# Patient Record
Sex: MALE | Race: WHITE | Employment: OTHER | URBAN - METROPOLITAN AREA
[De-identification: names, ages, dates, MRNs, and addresses within clinical notes are randomized per-mention and may not be internally consistent; named-entity substitution may affect disease eponyms.]

---

## 2018-04-25 ENCOUNTER — HOSPITAL ENCOUNTER (OUTPATIENT)
Age: 68
Discharge: HOME | End: 2018-04-27
Payer: COMMERCIAL

## 2018-04-25 LAB — PROSTATE SPECIFIC ANTIGEN: 9.7 NG/ML (ref 0–4)

## 2018-04-25 PROCEDURE — 84153 ASSAY OF PSA TOTAL: CPT

## 2018-05-14 ENCOUNTER — HOSPITAL ENCOUNTER (OUTPATIENT)
Age: 68
Discharge: HOME | End: 2018-05-16
Payer: COMMERCIAL

## 2018-05-14 LAB — PROSTATE SPECIFIC ANTIGEN: 6.25 NG/ML (ref 0–4)

## 2018-05-14 PROCEDURE — 84153 ASSAY OF PSA TOTAL: CPT

## 2018-08-14 ENCOUNTER — HOSPITAL ENCOUNTER (OUTPATIENT)
Age: 68
Discharge: HOME | End: 2018-08-16
Payer: COMMERCIAL

## 2018-08-14 LAB — PROSTATE SPECIFIC ANTIGEN: 5.5 NG/ML (ref 0–4)

## 2018-08-14 PROCEDURE — 84153 ASSAY OF PSA TOTAL: CPT

## 2018-10-30 ENCOUNTER — HOSPITAL ENCOUNTER (OUTPATIENT)
Dept: MRI IMAGING | Age: 68
Discharge: HOME | End: 2018-11-01
Payer: MEDICARE

## 2018-10-30 DIAGNOSIS — M54.16 LUMBAR RADICULOPATHY: ICD-10-CM

## 2018-10-30 DIAGNOSIS — M54.50 ACUTE MIDLINE LOW BACK PAIN WITHOUT SCIATICA: ICD-10-CM

## 2018-10-30 PROCEDURE — 72148 MRI LUMBAR SPINE W/O DYE: CPT

## 2019-01-30 ENCOUNTER — HOSPITAL ENCOUNTER (OUTPATIENT)
Age: 69
Discharge: HOME | End: 2019-02-01
Payer: MEDICARE

## 2019-01-30 PROCEDURE — 84153 ASSAY OF PSA TOTAL: CPT

## 2019-01-31 LAB — PROSTATE SPECIFIC ANTIGEN: 6.51 NG/ML (ref 0–4)

## 2019-02-08 ENCOUNTER — HOSPITAL ENCOUNTER (OUTPATIENT)
Age: 69
Discharge: HOME | End: 2019-02-10

## 2019-02-08 PROCEDURE — 88305 TISSUE EXAM BY PATHOLOGIST: CPT

## 2020-02-12 ENCOUNTER — HOSPITAL ENCOUNTER (OUTPATIENT)
Age: 70
Discharge: HOME | End: 2020-02-14
Payer: MEDICARE

## 2020-02-12 LAB — PROSTATE SPECIFIC ANTIGEN: 5.02 NG/ML (ref 0–4)

## 2020-02-12 PROCEDURE — 84153 ASSAY OF PSA TOTAL: CPT

## 2020-03-05 ENCOUNTER — OFFICE VISIT (OUTPATIENT)
Dept: CARDIOLOGY CLINIC | Age: 70
End: 2020-03-05
Payer: MEDICARE

## 2020-03-05 VITALS
RESPIRATION RATE: 18 BRPM | WEIGHT: 221.1 LBS | DIASTOLIC BLOOD PRESSURE: 72 MMHG | HEART RATE: 86 BPM | HEIGHT: 73 IN | BODY MASS INDEX: 29.3 KG/M2 | SYSTOLIC BLOOD PRESSURE: 132 MMHG

## 2020-03-05 PROBLEM — I45.10 RBBB: Status: ACTIVE | Noted: 2020-03-05

## 2020-03-05 PROBLEM — I48.19 OTHER PERSISTENT ATRIAL FIBRILLATION (HCC): Status: ACTIVE | Noted: 2020-03-05

## 2020-03-05 PROCEDURE — G8427 DOCREV CUR MEDS BY ELIG CLIN: HCPCS | Performed by: INTERNAL MEDICINE

## 2020-03-05 PROCEDURE — 3017F COLORECTAL CA SCREEN DOC REV: CPT | Performed by: INTERNAL MEDICINE

## 2020-03-05 PROCEDURE — 4040F PNEUMOC VAC/ADMIN/RCVD: CPT | Performed by: INTERNAL MEDICINE

## 2020-03-05 PROCEDURE — G8417 CALC BMI ABV UP PARAM F/U: HCPCS | Performed by: INTERNAL MEDICINE

## 2020-03-05 PROCEDURE — 99205 OFFICE O/P NEW HI 60 MIN: CPT | Performed by: INTERNAL MEDICINE

## 2020-03-05 PROCEDURE — 1123F ACP DISCUSS/DSCN MKR DOCD: CPT | Performed by: INTERNAL MEDICINE

## 2020-03-05 PROCEDURE — 93000 ELECTROCARDIOGRAM COMPLETE: CPT | Performed by: INTERNAL MEDICINE

## 2020-03-05 PROCEDURE — 1036F TOBACCO NON-USER: CPT | Performed by: INTERNAL MEDICINE

## 2020-03-05 PROCEDURE — G8484 FLU IMMUNIZE NO ADMIN: HCPCS | Performed by: INTERNAL MEDICINE

## 2020-03-05 RX ORDER — ASCORBIC ACID 500 MG
500 TABLET ORAL DAILY
COMMUNITY

## 2020-03-05 RX ORDER — MELATONIN 10 MG
10000 TABLET, SUBLINGUAL SUBLINGUAL DAILY
COMMUNITY

## 2020-03-05 RX ORDER — LORATADINE 10 MG/1
10 CAPSULE, LIQUID FILLED ORAL PRN
COMMUNITY

## 2020-03-05 RX ORDER — FLUTICASONE PROPIONATE 50 MCG
1 SPRAY, SUSPENSION (ML) NASAL DAILY
COMMUNITY

## 2020-03-05 RX ORDER — MULTIVITAMIN
TABLET ORAL
COMMUNITY

## 2020-03-05 NOTE — PROGRESS NOTES
pain.   Nose: denies epistaxis or masses   Throat: denies sore throat or trouble swallowing. Neuro: denies numbness, tingling, tremors. Skin: denies rashes or itching. : denies hematuria, dysuria   GI: denies vomiting, diarrhea   Psych: denies mood changed, anxiety, depression. all others negative. Physical Exam   /72   Pulse 86   Resp 18   Ht 6' 1\" (1.854 m)   Wt 221 lb 1.6 oz (100.3 kg)   BMI 29.17 kg/m²   Constitutional: Oriented to person, place, and time. Well-developed and well-nourished. No distress. Head: Normocephalic and atraumatic. Eyes: EOM are normal. Pupils are equal, round, and reactive to light. Neck: Normal range of motion. Neck supple. No hepatojugular reflux and no JVD present. Carotid bruit is not present. No tracheal deviation present. No thyromegaly present. Cardiovascular: Normal rate, irregular rhythm, normal heart sounds and intact distal pulses. Exam reveals no gallop and no friction rub. No murmur heard. Pulmonary/Chest: Effort normal and breath sounds normal. No respiratory distress. No wheezes. No rales. No tenderness. Abdominal: Soft. Bowel sounds are normal. No distension and no mass. No tenderness. No rebound and no guarding. Musculoskeletal: Normal range of motion. No edema and no tenderness. Neurological: Alert and oriented to person, place, and time. Skin: Skin is warm and dry. No rash noted. Not diaphoretic. No erythema. Psychiatric: Normal mood and affect. Behavior is normal.     EKG:  normal sinus rhythm, atrial fibrillation, rate controlled, RBBB, these findings are new since last EKG. ASSESSMENT AND PLAN:  Patient Active Problem List   Diagnosis    Hypertension    Type 2 diabetes mellitus without complication ([facility] [address]    Other persistent atrial fibrillation    RBBB     Newly detected atrial fibrillation, likely paroxysmal.  chads-vasc = 3 which argues for anticoagulation.   Discussed risks and benefits of Eliquis,including severe bleeding  E-prescribed Eliquis 5 mg BID  Will get echo and stress test  Already exercises, should drop 10% weight and investigate sleep apnea possibility thru PCP.      Nikolai Britton M.D  [id number] [facility]

## 2020-04-01 ENCOUNTER — TELEPHONE (OUTPATIENT)
Dept: CARDIOLOGY | Age: 70
End: 2020-04-01

## 2020-04-01 NOTE — TELEPHONE ENCOUNTER
Spoke with patient regarding essential testing per Dr. Loretta Cordova. Advised patient that he will not be having testing done at [place] office, but rather [place] and explained directions. Patient will await phone call to reschedule patient at [facility]. The patient was contacted and understands the reason for the delay in scheduling, and to call 911 for any symptoms.     Electronically signed by Jasmin Mireles on 4/1/2020 at 11:04 AM

## 2020-04-03 ENCOUNTER — TELEPHONE (OUTPATIENT)
Dept: CARDIOLOGY | Age: 70
End: 2020-04-03

## 2020-04-03 ENCOUNTER — TELEPHONE (OUTPATIENT)
Dept: CARDIOLOGY CLINIC | Age: 70
End: 2020-04-03

## 2020-04-03 NOTE — TELEPHONE ENCOUNTER
Left a message on patients home phone, reminder of appointment on Monday 4/6/2020 at 8:00am for a stress test and an echo to follow at 10:00am. Instructions left for patient and if any questions to call office.

## 2020-04-06 ENCOUNTER — APPOINTMENT (OUTPATIENT)
Dept: CARDIOLOGY | Age: 70
End: 2020-04-06
Payer: MEDICARE

## 2020-04-06 ENCOUNTER — HOSPITAL ENCOUNTER (OUTPATIENT)
Dept: CARDIOLOGY | Age: 70
Discharge: HOME | End: 2020-04-06
Payer: MEDICARE

## 2020-04-06 VITALS
WEIGHT: 221 LBS | DIASTOLIC BLOOD PRESSURE: 80 MMHG | HEART RATE: 98 BPM | BODY MASS INDEX: 29.29 KG/M2 | TEMPERATURE: 98.2 F | HEIGHT: 73 IN | SYSTOLIC BLOOD PRESSURE: 136 MMHG

## 2020-04-06 LAB
LV EF: 63 %
LV EF: 65 %
LVEF MODALITY: NORMAL
LVEF MODALITY: NORMAL

## 2020-04-06 PROCEDURE — 93306 TTE W/DOPPLER COMPLETE: CPT | Performed by: PSYCHIATRY & NEUROLOGY

## 2020-04-06 PROCEDURE — 2580000003 HC RX 258: Performed by: INTERNAL MEDICINE

## 2020-04-06 PROCEDURE — 78452 HT MUSCLE IMAGE SPECT MULT: CPT

## 2020-04-06 PROCEDURE — 93306 TTE W/DOPPLER COMPLETE: CPT | Performed by: INTERNAL MEDICINE

## 2020-04-06 PROCEDURE — 3430000000 HC RX DIAGNOSTIC RADIOPHARMACEUTICAL: Performed by: INTERNAL MEDICINE

## 2020-04-06 PROCEDURE — A9502 TC99M TETROFOSMIN: HCPCS | Performed by: INTERNAL MEDICINE

## 2020-04-06 PROCEDURE — 93017 CV STRESS TEST TRACING ONLY: CPT

## 2020-04-06 RX ORDER — SODIUM CHLORIDE 0.9 % (FLUSH) 0.9 %
10 SYRINGE (ML) INJECTION PRN
Status: DISCONTINUED | OUTPATIENT
Start: 2020-04-06 | End: 2020-04-07 | Stop reason: HOSPADM

## 2020-04-06 RX ADMIN — TETROFOSMIN 29 MILLICURIE: 0.23 INJECTION, POWDER, LYOPHILIZED, FOR SOLUTION INTRAVENOUS at 09:19

## 2020-04-06 RX ADMIN — TETROFOSMIN 8.3 MILLICURIE: 0.23 INJECTION, POWDER, LYOPHILIZED, FOR SOLUTION INTRAVENOUS at 08:09

## 2020-04-06 RX ADMIN — Medication 10 ML: at 09:20

## 2020-04-06 RX ADMIN — Medication 10 ML: at 08:09

## 2020-04-06 NOTE — PROCEDURES
of the study was excellent. Left ventricular cavity size was noted to be normal.    Rotational analog analysis demonstrated no patient motion or abnormal extracardiac radioactivity. The gated SPECT stress imaging in the short, vertical long, and horizontal long axis demonstrated normal homogeneous tracer distribution throughout the myocardium. The resting images are normal.     Gated SPECT left ventricular ejection fraction was calculated to be 63%, with normal myocardial thickening and wall motion. Impression:    1. Exercise EKG was negative. 2. The patient experienced no chest pain with exercise. 3. The myocardial perfusion imaging was normal.      4. Overall left ventricular systolic function was normal without regional wall motion abnormalities  5. Exercise capacity was below average. 6. Intermediate risk general exercise nuclear treadmill test due to functional capacity. Thank you for sending your patient to this [employer].      Electronically signed by Tati Peña MD on 4/7/20 at 6:59 AM EDT

## 2020-04-07 ENCOUNTER — TELEPHONE (OUTPATIENT)
Dept: CARDIOLOGY CLINIC | Age: 70
End: 2020-04-07

## 2020-05-14 ENCOUNTER — HOSPITAL ENCOUNTER (OUTPATIENT)
Age: 70
Discharge: HOME | End: 2020-05-16
Payer: MEDICARE

## 2020-05-14 ENCOUNTER — HOSPITAL ENCOUNTER (OUTPATIENT)
Dept: GENERAL RADIOLOGY | Age: 70
Discharge: HOME | End: 2020-05-16
Payer: MEDICARE

## 2020-05-14 PROCEDURE — 73552 X-RAY EXAM OF FEMUR 2/>: CPT

## 2020-05-14 PROCEDURE — 73502 X-RAY EXAM HIP UNI 2-3 VIEWS: CPT

## 2020-05-18 ENCOUNTER — HOSPITAL ENCOUNTER (EMERGENCY)
Age: 70
Discharge: HOME | End: 2020-05-18
Attending: EMERGENCY MEDICINE
Payer: MEDICARE

## 2020-05-18 ENCOUNTER — APPOINTMENT (OUTPATIENT)
Dept: GENERAL RADIOLOGY | Age: 70
End: 2020-05-18
Payer: MEDICARE

## 2020-05-18 VITALS
SYSTOLIC BLOOD PRESSURE: 135 MMHG | TEMPERATURE: 97.2 F | OXYGEN SATURATION: 98 % | DIASTOLIC BLOOD PRESSURE: 85 MMHG | HEART RATE: 74 BPM | RESPIRATION RATE: 17 BRPM

## 2020-05-18 LAB
ALBUMIN SERPL-MCNC: 4.7 G/DL (ref 3.5–5.2)
ALP BLD-CCNC: 83 U/L (ref 40–129)
ALT SERPL-CCNC: 30 U/L (ref 0–40)
ANION GAP SERPL CALCULATED.3IONS-SCNC: 5 MMOL/L (ref 7–16)
AST SERPL-CCNC: 31 U/L (ref 0–39)
BASOPHILS ABSOLUTE: 0.05 E9/L (ref 0–0.2)
BASOPHILS RELATIVE PERCENT: 0.7 % (ref 0–2)
BILIRUB SERPL-MCNC: 1 MG/DL (ref 0–1.2)
BUN BLDV-MCNC: 24 MG/DL (ref 8–23)
CALCIUM SERPL-MCNC: 9.9 MG/DL (ref 8.6–10.2)
CHLORIDE BLD-SCNC: 99 MMOL/L (ref 98–107)
CO2: 28 MMOL/L (ref 22–29)
CREAT SERPL-MCNC: 0.9 MG/DL (ref 0.7–1.2)
EKG ATRIAL RATE: 127 BPM
EKG Q-T INTERVAL: 336 MS
EKG QRS DURATION: 122 MS
EKG QTC CALCULATION (BAZETT): 512 MS
EKG R AXIS: 32 DEGREES
EKG T AXIS: -7 DEGREES
EKG VENTRICULAR RATE: 140 BPM
EOSINOPHILS ABSOLUTE: 0.55 E9/L (ref 0.05–0.5)
EOSINOPHILS RELATIVE PERCENT: 7.7 % (ref 0–6)
GFR AFRICAN AMERICAN: >60
GFR NON-AFRICAN AMERICAN: >60 ML/MIN/1.73
GLUCOSE BLD-MCNC: 135 MG/DL (ref 74–99)
HCT VFR BLD CALC: 47.6 % (ref 37–54)
HEMOGLOBIN: 15.4 G/DL (ref 12.5–16.5)
IMMATURE GRANULOCYTES #: 0.01 E9/L
IMMATURE GRANULOCYTES %: 0.1 % (ref 0–5)
LYMPHOCYTES ABSOLUTE: 1.68 E9/L (ref 1.5–4)
LYMPHOCYTES RELATIVE PERCENT: 23.5 % (ref 20–42)
MAGNESIUM: 2.1 MG/DL (ref 1.6–2.6)
MCH RBC QN AUTO: 29.2 PG (ref 26–35)
MCHC RBC AUTO-ENTMCNC: 32.4 % (ref 32–34.5)
MCV RBC AUTO: 90.2 FL (ref 80–99.9)
MONOCYTES ABSOLUTE: 0.82 E9/L (ref 0.1–0.95)
MONOCYTES RELATIVE PERCENT: 11.5 % (ref 2–12)
NEUTROPHILS ABSOLUTE: 4.05 E9/L (ref 1.8–7.3)
NEUTROPHILS RELATIVE PERCENT: 56.5 % (ref 43–80)
PDW BLD-RTO: 14.1 FL (ref 11.5–15)
PLATELET # BLD: 177 E9/L (ref 130–450)
PMV BLD AUTO: 11.6 FL (ref 7–12)
POTASSIUM SERPL-SCNC: 5.1 MMOL/L (ref 3.5–5)
PRO-BNP: 820 PG/ML (ref 0–125)
RBC # BLD: 5.28 E12/L (ref 3.8–5.8)
SODIUM BLD-SCNC: 132 MMOL/L (ref 132–146)
TOTAL PROTEIN: 8 G/DL (ref 6.4–8.3)
TROPONIN: <0.01 NG/ML (ref 0–0.03)
TSH SERPL DL<=0.05 MIU/L-ACNC: 0.87 UIU/ML (ref 0.27–4.2)
WBC # BLD: 7.2 E9/L (ref 4.5–11.5)

## 2020-05-18 PROCEDURE — 71045 X-RAY EXAM CHEST 1 VIEW: CPT

## 2020-05-18 PROCEDURE — 80053 COMPREHEN METABOLIC PANEL: CPT

## 2020-05-18 PROCEDURE — 93010 ELECTROCARDIOGRAM REPORT: CPT | Performed by: INTERNAL MEDICINE

## 2020-05-18 PROCEDURE — 94760 N-INVAS EAR/PLS OXIMETRY 1: CPT

## 2020-05-18 PROCEDURE — 84484 ASSAY OF TROPONIN QUANT: CPT

## 2020-05-18 PROCEDURE — 85025 COMPLETE CBC W/AUTO DIFF WBC: CPT

## 2020-05-18 PROCEDURE — 2580000003 HC RX 258: Performed by: EMERGENCY MEDICINE

## 2020-05-18 PROCEDURE — 83880 ASSAY OF NATRIURETIC PEPTIDE: CPT

## 2020-05-18 PROCEDURE — 2500000003 HC RX 250 WO HCPCS: Performed by: EMERGENCY MEDICINE

## 2020-05-18 PROCEDURE — 96365 THER/PROPH/DIAG IV INF INIT: CPT

## 2020-05-18 PROCEDURE — 83735 ASSAY OF MAGNESIUM: CPT

## 2020-05-18 PROCEDURE — 6370000000 HC RX 637 (ALT 250 FOR IP): Performed by: RADIOLOGY

## 2020-05-18 PROCEDURE — 93005 ELECTROCARDIOGRAM TRACING: CPT | Performed by: EMERGENCY MEDICINE

## 2020-05-18 PROCEDURE — 84443 ASSAY THYROID STIM HORMONE: CPT

## 2020-05-18 PROCEDURE — 99285 EMERGENCY DEPT VISIT HI MDM: CPT

## 2020-05-18 RX ORDER — METOPROLOL SUCCINATE 25 MG/1
50 TABLET, EXTENDED RELEASE ORAL ONCE
Status: COMPLETED | OUTPATIENT
Start: 2020-05-18 | End: 2020-05-18

## 2020-05-18 RX ADMIN — DILTIAZEM HYDROCHLORIDE 25 MG: 5 INJECTION INTRAVENOUS at 15:17

## 2020-05-18 RX ADMIN — APIXABAN 5 MG: 5 TABLET, FILM COATED ORAL at 17:15

## 2020-05-18 RX ADMIN — METOPROLOL SUCCINATE 50 MG: 25 TABLET, EXTENDED RELEASE ORAL at 17:15

## 2020-05-18 ASSESSMENT — ENCOUNTER SYMPTOMS
DIARRHEA: 0
VOMITING: 0
ABDOMINAL PAIN: 0
SHORTNESS OF BREATH: 1
CHEST TIGHTNESS: 0
COUGH: 0
NAUSEA: 0

## 2020-05-18 NOTE — ED PROVIDER NOTES
Rate 127 BPM    QRS Duration 122 ms    Q-T Interval 336 ms    QTc Calculation (Bazett) 512 ms    R Axis 32 degrees    T Axis -7 degrees   EKG 12 Lead   Result Value Ref Range    Ventricular Rate 75 BPM    Atrial Rate 300 BPM    QRS Duration 140 ms    Q-T Interval 406 ms    QTc Calculation (Bazett) 453 ms    R Axis 8 degrees    T Axis -15 degrees       Radiology:  XR CHEST PORTABLE   Final Result   Findings compatible with atherosclerotic disease    No acute infiltrate                         ------------------------- NURSING NOTES AND VITALS REVIEWED ---------------------------  Date / Time Roomed:  5/18/2020  2:49 PM  ED Bed Assignment:  17B/17B-17    The nursing notes within the ED encounter and vital signs as below have been reviewed. /85   Pulse 74   Temp 97.2 °F (36.2 °C) (Temporal)   Resp 17   SpO2 98%   Oxygen Saturation Interpretation: Normal      ------------------------------------------ PROGRESS NOTES ------------------------------------------  5:09 PM EDT  I have spoken with the patient and discussed todays results, in addition to providing specific details for the plan of care and counseling regarding the diagnosis and prognosis. Their questions are answered at this time and they are agreeable with the plan. I discussed at length with them reasons for immediate return here for re evaluation. They will followup with Dr. Cortez Lopez tomorrow to discuss his ED visit and form a plan.       --------------------------------- ADDITIONAL PROVIDER NOTES ---------------------------------  At this time the patient is without objective evidence of an acute process requiring hospitalization or inpatient management. They have remained hemodynamically stable throughout their entire ED visit and are stable for discharge with outpatient follow-up. The plan has been discussed in detail and they are aware of the specific conditions for emergent return, as well as the importance of follow-up.       Discharge

## 2020-05-19 ENCOUNTER — OFFICE VISIT (OUTPATIENT)
Dept: CARDIOLOGY CLINIC | Age: 70
End: 2020-05-19
Payer: MEDICARE

## 2020-05-19 ENCOUNTER — HOSPITAL ENCOUNTER (OUTPATIENT)
Age: 70
Setting detail: OBSERVATION
Discharge: HOME | End: 2020-05-20
Attending: EMERGENCY MEDICINE | Admitting: INTERNAL MEDICINE
Payer: MEDICARE

## 2020-05-19 ENCOUNTER — APPOINTMENT (OUTPATIENT)
Dept: GENERAL RADIOLOGY | Age: 70
End: 2020-05-19
Payer: MEDICARE

## 2020-05-19 VITALS
HEART RATE: 151 BPM | SYSTOLIC BLOOD PRESSURE: 110 MMHG | RESPIRATION RATE: 16 BRPM | HEIGHT: 73 IN | BODY MASS INDEX: 28.49 KG/M2 | WEIGHT: 215 LBS | DIASTOLIC BLOOD PRESSURE: 76 MMHG

## 2020-05-19 PROBLEM — I48.91 ATRIAL FIBRILLATION WITH RVR (HCC): Status: ACTIVE | Noted: 2020-05-19

## 2020-05-19 PROBLEM — Z92.89 HISTORY OF ECHOCARDIOGRAM: Status: ACTIVE | Noted: 2020-05-19

## 2020-05-19 PROBLEM — Z92.89 HISTORY OF NUCLEAR STRESS TEST: Status: ACTIVE | Noted: 2020-05-19

## 2020-05-19 LAB
ANION GAP SERPL CALCULATED.3IONS-SCNC: 10 MMOL/L (ref 7–16)
BASOPHILS ABSOLUTE: 0.04 E9/L (ref 0–0.2)
BASOPHILS RELATIVE PERCENT: 0.6 % (ref 0–2)
BUN BLDV-MCNC: 22 MG/DL (ref 8–23)
CALCIUM SERPL-MCNC: 9.1 MG/DL (ref 8.6–10.2)
CHLORIDE BLD-SCNC: 107 MMOL/L (ref 98–107)
CO2: 23 MMOL/L (ref 22–29)
CREAT SERPL-MCNC: 0.7 MG/DL (ref 0.7–1.2)
EKG ATRIAL RATE: 138 BPM
EKG ATRIAL RATE: 300 BPM
EKG Q-T INTERVAL: 332 MS
EKG Q-T INTERVAL: 406 MS
EKG QRS DURATION: 122 MS
EKG QRS DURATION: 140 MS
EKG QTC CALCULATION (BAZETT): 453 MS
EKG QTC CALCULATION (BAZETT): 519 MS
EKG R AXIS: 8 DEGREES
EKG T AXIS: -15 DEGREES
EKG T AXIS: -6 DEGREES
EKG VENTRICULAR RATE: 147 BPM
EKG VENTRICULAR RATE: 75 BPM
EOSINOPHILS ABSOLUTE: 0.33 E9/L (ref 0.05–0.5)
EOSINOPHILS RELATIVE PERCENT: 5.3 % (ref 0–6)
GFR AFRICAN AMERICAN: >60
GFR NON-AFRICAN AMERICAN: >60 ML/MIN/1.73
GLUCOSE BLD-MCNC: 145 MG/DL (ref 74–99)
HCT VFR BLD CALC: 47 % (ref 37–54)
HEMOGLOBIN: 15.2 G/DL (ref 12.5–16.5)
IMMATURE GRANULOCYTES #: 0.03 E9/L
IMMATURE GRANULOCYTES %: 0.5 % (ref 0–5)
INR BLD: 1.4
LYMPHOCYTES ABSOLUTE: 1.2 E9/L (ref 1.5–4)
LYMPHOCYTES RELATIVE PERCENT: 19.1 % (ref 20–42)
MCH RBC QN AUTO: 29.4 PG (ref 26–35)
MCHC RBC AUTO-ENTMCNC: 32.3 % (ref 32–34.5)
MCV RBC AUTO: 90.9 FL (ref 80–99.9)
METER GLUCOSE: 103 MG/DL (ref 74–99)
MONOCYTES ABSOLUTE: 0.72 E9/L (ref 0.1–0.95)
MONOCYTES RELATIVE PERCENT: 11.5 % (ref 2–12)
NEUTROPHILS ABSOLUTE: 3.95 E9/L (ref 1.8–7.3)
NEUTROPHILS RELATIVE PERCENT: 63 % (ref 43–80)
PDW BLD-RTO: 14.2 FL (ref 11.5–15)
PLATELET # BLD: 178 E9/L (ref 130–450)
PMV BLD AUTO: 12 FL (ref 7–12)
POTASSIUM REFLEX MAGNESIUM: 4.5 MMOL/L (ref 3.5–5)
PRO-BNP: 549 PG/ML (ref 0–125)
PRO-BNP: 583 PG/ML (ref 0–125)
PROTHROMBIN TIME: 15.9 SEC (ref 9.3–12.4)
RBC # BLD: 5.17 E12/L (ref 3.8–5.8)
REASON FOR REJECTION: NORMAL
REJECTED TEST: NORMAL
SARS-COV-2, NAAT: NOT DETECTED
SODIUM BLD-SCNC: 140 MMOL/L (ref 132–146)
TROPONIN: <0.01 NG/ML (ref 0–0.03)
TROPONIN: <0.01 NG/ML (ref 0–0.03)
WBC # BLD: 6.3 E9/L (ref 4.5–11.5)

## 2020-05-19 PROCEDURE — 4040F PNEUMOC VAC/ADMIN/RCVD: CPT | Performed by: INTERNAL MEDICINE

## 2020-05-19 PROCEDURE — 99285 EMERGENCY DEPT VISIT HI MDM: CPT

## 2020-05-19 PROCEDURE — G8417 CALC BMI ABV UP PARAM F/U: HCPCS | Performed by: INTERNAL MEDICINE

## 2020-05-19 PROCEDURE — 96374 THER/PROPH/DIAG INJ IV PUSH: CPT

## 2020-05-19 PROCEDURE — 1036F TOBACCO NON-USER: CPT | Performed by: INTERNAL MEDICINE

## 2020-05-19 PROCEDURE — 93000 ELECTROCARDIOGRAM COMPLETE: CPT | Performed by: INTERNAL MEDICINE

## 2020-05-19 PROCEDURE — 82962 GLUCOSE BLOOD TEST: CPT

## 2020-05-19 PROCEDURE — 36415 COLL VENOUS BLD VENIPUNCTURE: CPT

## 2020-05-19 PROCEDURE — 2500000003 HC RX 250 WO HCPCS: Performed by: EMERGENCY MEDICINE

## 2020-05-19 PROCEDURE — 93010 ELECTROCARDIOGRAM REPORT: CPT | Performed by: INTERNAL MEDICINE

## 2020-05-19 PROCEDURE — U0002 COVID-19 LAB TEST NON-CDC: HCPCS

## 2020-05-19 PROCEDURE — G8427 DOCREV CUR MEDS BY ELIG CLIN: HCPCS | Performed by: INTERNAL MEDICINE

## 2020-05-19 PROCEDURE — 2580000003 HC RX 258: Performed by: PHYSICIAN ASSISTANT

## 2020-05-19 PROCEDURE — G0378 HOSPITAL OBSERVATION PER HR: HCPCS

## 2020-05-19 PROCEDURE — 2140000000 HC CCU INTERMEDIATE R&B

## 2020-05-19 PROCEDURE — 80048 BASIC METABOLIC PNL TOTAL CA: CPT

## 2020-05-19 PROCEDURE — 71045 X-RAY EXAM CHEST 1 VIEW: CPT

## 2020-05-19 PROCEDURE — 85610 PROTHROMBIN TIME: CPT

## 2020-05-19 PROCEDURE — 1123F ACP DISCUSS/DSCN MKR DOCD: CPT | Performed by: INTERNAL MEDICINE

## 2020-05-19 PROCEDURE — 85025 COMPLETE CBC W/AUTO DIFF WBC: CPT

## 2020-05-19 PROCEDURE — 3017F COLORECTAL CA SCREEN DOC REV: CPT | Performed by: INTERNAL MEDICINE

## 2020-05-19 PROCEDURE — 83880 ASSAY OF NATRIURETIC PEPTIDE: CPT

## 2020-05-19 PROCEDURE — 2580000003 HC RX 258: Performed by: EMERGENCY MEDICINE

## 2020-05-19 PROCEDURE — 94761 N-INVAS EAR/PLS OXIMETRY MLT: CPT

## 2020-05-19 PROCEDURE — 93005 ELECTROCARDIOGRAM TRACING: CPT | Performed by: EMERGENCY MEDICINE

## 2020-05-19 PROCEDURE — 99999 PR OFFICE/OUTPT VISIT,PROCEDURE ONLY: CPT | Performed by: INTERNAL MEDICINE

## 2020-05-19 PROCEDURE — 84484 ASSAY OF TROPONIN QUANT: CPT

## 2020-05-19 RX ORDER — ONDANSETRON 2 MG/ML
4 INJECTION INTRAMUSCULAR; INTRAVENOUS EVERY 6 HOURS PRN
Status: DISCONTINUED | OUTPATIENT
Start: 2020-05-19 | End: 2020-05-20 | Stop reason: HOSPADM

## 2020-05-19 RX ORDER — 0.9 % SODIUM CHLORIDE 0.9 %
1000 INTRAVENOUS SOLUTION INTRAVENOUS ONCE
Status: COMPLETED | OUTPATIENT
Start: 2020-05-19 | End: 2020-05-19

## 2020-05-19 RX ORDER — PROMETHAZINE HYDROCHLORIDE 25 MG/1
12.5 TABLET ORAL EVERY 6 HOURS PRN
Status: DISCONTINUED | OUTPATIENT
Start: 2020-05-19 | End: 2020-05-20 | Stop reason: HOSPADM

## 2020-05-19 RX ORDER — DEXTROSE MONOHYDRATE 25 G/50ML
12.5 INJECTION, SOLUTION INTRAVENOUS PRN
Status: DISCONTINUED | OUTPATIENT
Start: 2020-05-19 | End: 2020-05-20 | Stop reason: HOSPADM

## 2020-05-19 RX ORDER — POLYETHYLENE GLYCOL 3350 17 G/17G
17 POWDER, FOR SOLUTION ORAL DAILY PRN
Status: DISCONTINUED | OUTPATIENT
Start: 2020-05-19 | End: 2020-05-20 | Stop reason: HOSPADM

## 2020-05-19 RX ORDER — DEXTROSE MONOHYDRATE 50 MG/ML
100 INJECTION, SOLUTION INTRAVENOUS PRN
Status: DISCONTINUED | OUTPATIENT
Start: 2020-05-19 | End: 2020-05-20 | Stop reason: HOSPADM

## 2020-05-19 RX ORDER — METOPROLOL SUCCINATE 50 MG/1
50 TABLET, EXTENDED RELEASE ORAL DAILY
Status: DISCONTINUED | OUTPATIENT
Start: 2020-05-19 | End: 2020-05-20 | Stop reason: HOSPADM

## 2020-05-19 RX ORDER — NICOTINE POLACRILEX 4 MG
15 LOZENGE BUCCAL PRN
Status: DISCONTINUED | OUTPATIENT
Start: 2020-05-19 | End: 2020-05-20 | Stop reason: HOSPADM

## 2020-05-19 RX ORDER — ACETAMINOPHEN 325 MG/1
325 TABLET ORAL EVERY 6 HOURS PRN
Status: DISCONTINUED | OUTPATIENT
Start: 2020-05-19 | End: 2020-05-20 | Stop reason: HOSPADM

## 2020-05-19 RX ORDER — AMLODIPINE BESYLATE 10 MG/1
10 TABLET ORAL DAILY
Status: DISCONTINUED | OUTPATIENT
Start: 2020-05-19 | End: 2020-05-20 | Stop reason: HOSPADM

## 2020-05-19 RX ORDER — TRAMADOL HYDROCHLORIDE 50 MG/1
50 TABLET ORAL EVERY 6 HOURS PRN
Status: DISCONTINUED | OUTPATIENT
Start: 2020-05-19 | End: 2020-05-20 | Stop reason: HOSPADM

## 2020-05-19 RX ORDER — NITROGLYCERIN 0.4 MG/1
0.4 TABLET SUBLINGUAL EVERY 5 MIN PRN
Status: DISCONTINUED | OUTPATIENT
Start: 2020-05-19 | End: 2020-05-20 | Stop reason: HOSPADM

## 2020-05-19 RX ORDER — ACETAMINOPHEN 325 MG/1
650 TABLET ORAL EVERY 6 HOURS PRN
Status: DISCONTINUED | OUTPATIENT
Start: 2020-05-19 | End: 2020-05-20 | Stop reason: HOSPADM

## 2020-05-19 RX ORDER — ACETAMINOPHEN 650 MG/1
650 SUPPOSITORY RECTAL EVERY 6 HOURS PRN
Status: DISCONTINUED | OUTPATIENT
Start: 2020-05-19 | End: 2020-05-20 | Stop reason: HOSPADM

## 2020-05-19 RX ORDER — POTASSIUM CHLORIDE 7.45 MG/ML
10 INJECTION INTRAVENOUS PRN
Status: DISCONTINUED | OUTPATIENT
Start: 2020-05-19 | End: 2020-05-20 | Stop reason: HOSPADM

## 2020-05-19 RX ORDER — POTASSIUM CHLORIDE 20 MEQ/1
40 TABLET, EXTENDED RELEASE ORAL PRN
Status: DISCONTINUED | OUTPATIENT
Start: 2020-05-19 | End: 2020-05-20 | Stop reason: HOSPADM

## 2020-05-19 RX ORDER — SODIUM CHLORIDE 0.9 % (FLUSH) 0.9 %
10 SYRINGE (ML) INJECTION EVERY 12 HOURS SCHEDULED
Status: DISCONTINUED | OUTPATIENT
Start: 2020-05-19 | End: 2020-05-20 | Stop reason: HOSPADM

## 2020-05-19 RX ORDER — SODIUM CHLORIDE 0.9 % (FLUSH) 0.9 %
10 SYRINGE (ML) INJECTION PRN
Status: DISCONTINUED | OUTPATIENT
Start: 2020-05-19 | End: 2020-05-20 | Stop reason: HOSPADM

## 2020-05-19 RX ADMIN — SODIUM CHLORIDE, PRESERVATIVE FREE 10 ML: 5 INJECTION INTRAVENOUS at 21:00

## 2020-05-19 RX ADMIN — SODIUM CHLORIDE 1000 ML: 9 INJECTION, SOLUTION INTRAVENOUS at 15:05

## 2020-05-19 RX ADMIN — DILTIAZEM HYDROCHLORIDE 25 MG: 5 INJECTION INTRAVENOUS at 15:06

## 2020-05-19 ASSESSMENT — PAIN SCALES - GENERAL
PAINLEVEL_OUTOF10: 0

## 2020-05-19 NOTE — ED PROVIDER NOTES
same.  He had intention to have ISSAC and cardioversion with cardiology however when his symptoms worsened and his heart rate increased he was directed to ED. He arrives normotensive and otherwise well-appearing, minimal distress. No increased work of breathing or hypoxia. EKG shows no signs of ischemia or injury, unchanged from previous. Patient was given bolus of Cardizem. There was successful rate control, repeat EKG shows an atrial flutter with no acute ST changes. Patient is still feeling somewhat fatigued. As patient has been having paroxysms of RVR despite appropriate medication, he will be admitted for management. He rested comfortably during his ED course. Metabolic panel is within acceptable limits, no leukocytosis or anemia. Troponin is negative, slight elevation in BNP but chest x-ray is unremarkable. Re-Evaluations: This patient's ED course included: a personal history and physicial examination, re-evaluation prior to disposition, IV medications, cardiac monitoring and continuous pulse oximetry    This patient has remained hemodynamically stable and improved during their ED course. Counseling: The emergency provider has spoken with the patient and discussed todays results, in addition to providing specific details for the plan of care and counseling regarding the diagnosis and prognosis. Questions are answered at this time and they are agreeable with the plan.       --------------------------------- IMPRESSION AND DISPOSITION ---------------------------------    IMPRESSION  1. Atrial fibrillation with RVR (HCC)        DISPOSITION  Disposition: Admit to telemetry  Patient condition is stable        NOTE: This report was transcribed using voice recognition software.  Every effort was made to ensure accuracy; however, inadvertent computerized transcription errors may be present        Diego Harrison DO  05/19/20 7919

## 2020-05-19 NOTE — PROGRESS NOTES
Chief Complaint   Patient presents with    Atrial Flutter    Hypertension       Patient Active Problem List    Diagnosis Date Noted    History of nuclear stress test 05/19/2020     Overview Note:     A. Normal exercise nuclear 2020        History of echocardiogram 05/19/2020     Overview Note:     A. Normal echo 2020      Hyperlipidemia     DALTON (obstructive sleep apnea)     Other persistent atrial fibrillation 03/05/2020     Overview Note:     A.  CHADS-VASC = 3      RBBB 03/05/2020    Hypertension 09/13/2015    Type 2 diabetes mellitus without complication (HCC) 79/47/1939       Current Outpatient Medications   Medication Sig Dispense Refill    apixaban (ELIQUIS) 5 MG TABS tablet Take 5 mg by mouth 2 times daily      loratadine (CLARITIN) 10 MG capsule Take 10 mg by mouth as needed       fluticasone (FLONASE) 50 MCG/ACT nasal spray 1 spray by Each Nostril route daily      Multiple Vitamin (MULTI VITAMIN MENS) TABS Take by mouth      Potassium 99 MG TABS Take 99 mg by mouth daily      Cholecalciferol (VITAMIN D3) 250 MCG (37839 UT) TABS Take 10,000 Units by mouth daily      vitamin C (ASCORBIC ACID) 500 MG tablet Take 500 mg by mouth daily      amLODIPine (NORVASC) 10 MG tablet Take 10 mg by mouth daily       metFORMIN ER (GLUCOPHAGE-XR) 500 MG XR tablet Take 500 mg by mouth daily (with breakfast)       metoprolol (TOPROL-XL) 50 MG XL tablet Take 50 mg by mouth daily       Aspirin-Acetaminophen-Caffeine (EXCEDRIN PO) Take by mouth as needed       traMADol-acetaminophen (ULTRACET) 37.5-325 MG per tablet Take 1 tablet by mouth every 6 hours as needed for Pain       Current Facility-Administered Medications   Medication Dose Route Frequency Provider Last Rate Last Dose    perflutren lipid microspheres (DEFINITY) injection 1.65 mg  1.5 mL Intravenous ONCE PRN Molina Garibay MD            Allergies   Allergen Reactions    Pcn [Penicillins] Hives       Vitals:    05/19/20 1114   BP: 110/76   Pulse: 151   Resp: 16   Weight: 215 lb (97.5 kg)   Height: 6' 1\" (1.854 m)       Social History     Socioeconomic History    Marital status:      Spouse name: Not on file    Number of children: Not on file    Years of education: Not on file    Highest education level: Not on file   Occupational History    Not on file   Social Needs    Financial resource strain: Not on file    Food insecurity     Worry: Not on file     Inability: Not on file    Transportation needs     Medical: Not on file     Non-medical: Not on file   Tobacco Use    Smoking status: Never Smoker    Smokeless tobacco: Never Used   Substance and Sexual Activity    Alcohol use: No    Drug use: No    Sexual activity: Not on file   Lifestyle    Physical activity     Days per week: Not on file     Minutes per session: Not on file    Stress: Not on file   Relationships    Social connections     Talks on phone: Not on file     Gets together: Not on file     Attends Cheondo[employer] service: Not on file     Active member of club or organization: Not on file     Attends meetings of clubs or organizations: Not on file     Relationship status: Not on file    Intimate partner violence     Fear of current or ex partner: Not on file     Emotionally abused: Not on file     Physically abused: Not on file     Forced sexual activity: Not on file   Other Topics Concern    Not on file   Social History Narrative    Not on file       Family History   Problem Relation Age of Onset    Diabetes Mother     Heart Attack Father 48        MI           SUBJECTIVE: Logan Clark presents to the office today for urgent folllowup from ED for atrial flutter with RVR.    he has been compliant with BB but stopped his eliquis for a few days after he injured his Hammy and developed extensive ecchymosis and pain  He complains of fatigue and denies   dyspnea, exertional chest pressure/discomfort, lower extremity edema, near-syncope, orthopnea,  paroxysmal nocturnal dyspnea, syncope and tachypnea. Review of Systems:   Heart: as above   Lungs: as above   Eyes: denies changes in vision or discharge. Ears: denies changes in hearing or pain. Nose: denies epistaxis or masses   Throat: denies sore throat or trouble swallowing. Neuro: denies numbness, tingling, tremors. Skin: denies rashes or itching. : denies hematuria, dysuria   GI: denies vomiting, diarrhea   Psych: denies mood changed, anxiety, depression. all others negative. Physical Exam   /76   Pulse 151   Resp 16   Ht 6' 1\" (1.854 m)   Wt 215 lb (97.5 kg)   BMI 28.37 kg/m²   Constitutional: Oriented to person, place, and time. Well-developed and well-nourished. No distress. Head: Normocephalic and atraumatic. Eyes: EOM are normal. Pupils are equal, round, and reactive to light. Neck: Normal range of motion. Neck supple. No hepatojugular reflux and no JVD present. Carotid bruit is not present. No tracheal deviation present. No thyromegaly present. Cardiovascular: rapid rate, regular rhythm, normal heart sounds and intact distal pulses. Exam reveals no gallop and no friction rub. No murmur heard. Pulmonary/Chest: Effort normal and breath sounds normal. No respiratory distress. No wheezes. No rales. No tenderness. Abdominal: Soft. Bowel sounds are normal. No distension and no mass. No tenderness. No rebound and no guarding. Musculoskeletal: Normal range of motion. No edema and no tenderness. Neurological: Alert and oriented to person, place, and time. Skin: Skin is warm and dry. No rash noted. Not diaphoretic. No erythema. Psychiatric: Normal mood and affect. Behavior is normal.     EKG:  Atrial flutter with RVR, RBBB, these findings are new since last EKG.     ASSESSMENT AND PLAN:  Patient Active Problem List   Diagnosis    Hypertension    Type 2 diabetes mellitus without complication ([facility] [address]    Other persistent atrial fibrillation    RBBB    History of nuclear stress

## 2020-05-20 ENCOUNTER — ANESTHESIA EVENT (OUTPATIENT)
Dept: CARDIAC CATH/INVASIVE PROCEDURES | Age: 70
End: 2020-05-20
Payer: MEDICARE

## 2020-05-20 ENCOUNTER — ANESTHESIA (OUTPATIENT)
Dept: CARDIAC CATH/INVASIVE PROCEDURES | Age: 70
End: 2020-05-20
Payer: MEDICARE

## 2020-05-20 VITALS
RESPIRATION RATE: 16 BRPM | DIASTOLIC BLOOD PRESSURE: 81 MMHG | WEIGHT: 213.5 LBS | SYSTOLIC BLOOD PRESSURE: 138 MMHG | HEART RATE: 73 BPM | HEIGHT: 73 IN | TEMPERATURE: 98.6 F | BODY MASS INDEX: 28.3 KG/M2 | OXYGEN SATURATION: 96 %

## 2020-05-20 PROBLEM — I48.92 ATRIAL FLUTTER (HCC): Status: ACTIVE | Noted: 2020-05-20

## 2020-05-20 LAB
ANION GAP SERPL CALCULATED.3IONS-SCNC: 7 MMOL/L (ref 7–16)
BUN BLDV-MCNC: 21 MG/DL (ref 8–23)
CALCIUM SERPL-MCNC: 9.5 MG/DL (ref 8.6–10.2)
CHLORIDE BLD-SCNC: 104 MMOL/L (ref 98–107)
CHOLESTEROL, TOTAL: 223 MG/DL (ref 0–199)
CO2: 34 MMOL/L (ref 22–29)
CREAT SERPL-MCNC: 0.9 MG/DL (ref 0.7–1.2)
EKG ATRIAL RATE: 340 BPM
EKG ATRIAL RATE: 69 BPM
EKG P AXIS: 82 DEGREES
EKG P-R INTERVAL: 200 MS
EKG Q-T INTERVAL: 406 MS
EKG Q-T INTERVAL: 428 MS
EKG QRS DURATION: 132 MS
EKG QRS DURATION: 132 MS
EKG QTC CALCULATION (BAZETT): 447 MS
EKG QTC CALCULATION (BAZETT): 458 MS
EKG R AXIS: -15 DEGREES
EKG R AXIS: 6 DEGREES
EKG T AXIS: 26 DEGREES
EKG VENTRICULAR RATE: 69 BPM
EKG VENTRICULAR RATE: 73 BPM
GFR AFRICAN AMERICAN: >60
GFR NON-AFRICAN AMERICAN: >60 ML/MIN/1.73
GLUCOSE BLD-MCNC: 111 MG/DL (ref 74–99)
HCT VFR BLD CALC: 43.1 % (ref 37–54)
HDLC SERPL-MCNC: 34 MG/DL
HEMOGLOBIN: 13.6 G/DL (ref 12.5–16.5)
LDL CHOLESTEROL CALCULATED: 163 MG/DL (ref 0–99)
MCH RBC QN AUTO: 28.8 PG (ref 26–35)
MCHC RBC AUTO-ENTMCNC: 31.6 % (ref 32–34.5)
MCV RBC AUTO: 91.3 FL (ref 80–99.9)
PDW BLD-RTO: 14.3 FL (ref 11.5–15)
PLATELET # BLD: 134 E9/L (ref 130–450)
PMV BLD AUTO: 11.5 FL (ref 7–12)
POTASSIUM REFLEX MAGNESIUM: 4.4 MMOL/L (ref 3.5–5)
RBC # BLD: 4.72 E12/L (ref 3.8–5.8)
SODIUM BLD-SCNC: 145 MMOL/L (ref 132–146)
TRIGL SERPL-MCNC: 128 MG/DL (ref 0–149)
TROPONIN: <0.01 NG/ML (ref 0–0.03)
VLDLC SERPL CALC-MCNC: 26 MG/DL
WBC # BLD: 4.6 E9/L (ref 4.5–11.5)

## 2020-05-20 PROCEDURE — 93010 ELECTROCARDIOGRAM REPORT: CPT | Performed by: INTERNAL MEDICINE

## 2020-05-20 PROCEDURE — G0378 HOSPITAL OBSERVATION PER HR: HCPCS

## 2020-05-20 PROCEDURE — APPSS60 APP SPLIT SHARED TIME 46-60 MINUTES: Performed by: PHYSICIAN ASSISTANT

## 2020-05-20 PROCEDURE — 84484 ASSAY OF TROPONIN QUANT: CPT

## 2020-05-20 PROCEDURE — 80061 LIPID PANEL: CPT

## 2020-05-20 PROCEDURE — 97165 OT EVAL LOW COMPLEX 30 MIN: CPT

## 2020-05-20 PROCEDURE — 85027 COMPLETE CBC AUTOMATED: CPT

## 2020-05-20 PROCEDURE — 99215 OFFICE O/P EST HI 40 MIN: CPT | Performed by: INTERNAL MEDICINE

## 2020-05-20 PROCEDURE — 93005 ELECTROCARDIOGRAM TRACING: CPT | Performed by: INTERNAL MEDICINE

## 2020-05-20 PROCEDURE — 2580000003 HC RX 258: Performed by: PHYSICIAN ASSISTANT

## 2020-05-20 PROCEDURE — 36415 COLL VENOUS BLD VENIPUNCTURE: CPT

## 2020-05-20 PROCEDURE — 6370000000 HC RX 637 (ALT 250 FOR IP): Performed by: PHYSICIAN ASSISTANT

## 2020-05-20 PROCEDURE — 80048 BASIC METABOLIC PNL TOTAL CA: CPT

## 2020-05-20 PROCEDURE — 97161 PT EVAL LOW COMPLEX 20 MIN: CPT | Performed by: PHYSICAL THERAPIST

## 2020-05-20 RX ORDER — METOPROLOL SUCCINATE 50 MG/1
50 TABLET, EXTENDED RELEASE ORAL 2 TIMES DAILY
Qty: 60 TABLET | Refills: 3 | Status: SHIPPED | OUTPATIENT
Start: 2020-05-20

## 2020-05-20 RX ORDER — ATORVASTATIN CALCIUM 40 MG/1
40 TABLET, FILM COATED ORAL DAILY
Qty: 30 TABLET | Refills: 2 | Status: SHIPPED | OUTPATIENT
Start: 2020-05-20 | End: 2020-09-25

## 2020-05-20 RX ADMIN — APIXABAN 5 MG: 5 TABLET, FILM COATED ORAL at 09:09

## 2020-05-20 RX ADMIN — SODIUM CHLORIDE, PRESERVATIVE FREE 10 ML: 5 INJECTION INTRAVENOUS at 09:09

## 2020-05-20 RX ADMIN — AMLODIPINE BESYLATE 10 MG: 10 TABLET ORAL at 09:09

## 2020-05-20 RX ADMIN — METOPROLOL SUCCINATE 50 MG: 50 TABLET, EXTENDED RELEASE ORAL at 09:08

## 2020-05-20 ASSESSMENT — PAIN SCALES - GENERAL
PAINLEVEL_OUTOF10: 0
PAINLEVEL_OUTOF10: 0

## 2020-05-20 NOTE — H&P
Consult appreciated   Medications for other co morbidities cont as appropriate w dosage adjustments as necessary   PT/OT  DVT PPx  DC planning  Increase Toprol and DC      Electronically signed by Evans Malone MD on 5/20/2020 at 7:28 AM

## 2020-05-20 NOTE — PLAN OF CARE
Heart rate will remain 70's to 90's   Will experience no SOB  Urine output will be above 150 in 4 hours

## 2020-05-20 NOTE — ANESTHESIA PRE PROCEDURE
Department of Anesthesiology  Preprocedure Note       Name:  Germain Gallego   Age:  68 y.o.  :  1950                                          MRN:  [id number]         Date:  2020      Surgeon: Dr Emily Quinteros  Procedure: ISSAC DCCV  Medications prior to admission:   Prior to Admission medications    Medication Sig Start Date End Date Taking?  Authorizing Provider   apixaban (ELIQUIS) 5 MG TABS tablet Take 5 mg by mouth 2 times daily   Yes Historical Provider, MD   loratadine (CLARITIN) 10 MG capsule Take 10 mg by mouth as needed    Yes Historical Provider, MD   fluticasone (FLONASE) 50 MCG/ACT nasal spray 1 spray by Each Nostril route daily   Yes Historical Provider, MD   Multiple Vitamin (MULTI VITAMIN MENS) TABS Take by mouth   Yes Historical Provider, MD   Cholecalciferol (VITAMIN D3) 250 MCG (08557 UT) TABS Take 10,000 Units by mouth daily   Yes Historical Provider, MD   vitamin C (ASCORBIC ACID) 500 MG tablet Take 500 mg by mouth daily   Yes Historical Provider, MD   amLODIPine (NORVASC) 10 MG tablet Take 10 mg by mouth daily  9/5/15  Yes Historical Provider, MD   metFORMIN ER (GLUCOPHAGE-XR) 500 MG XR tablet Take 500 mg by mouth daily (with breakfast)  7/17/15  Yes Historical Provider, MD   metoprolol (TOPROL-XL) 50 MG XL tablet Take 50 mg by mouth daily  8/20/15  Yes Historical Provider, MD   Aspirin-Acetaminophen-Caffeine (EXCEDRIN PO) Take by mouth as needed    Yes Historical Provider, MD       Current medications:    Current Facility-Administered Medications   Medication Dose Route Frequency Provider Last Rate Last Dose    amLODIPine (NORVASC) tablet 10 mg  10 mg Oral Daily AMOR Nick   10 mg at 20 7531    apixaban (ELIQUIS) tablet 5 mg  5 mg Oral BID AMOR Nick   5 mg at 20 0909    metFORMIN (GLUCOPHAGE) tablet 500 mg  500 mg Oral Daily with breakfast AMOR Nick        metoprolol succinate (TOPROL XL) extended release tablet 50 mg  50 mg Oral Daily Liliana Ripper M Itching     Also bandaids    Pcn [Penicillins] Hives       Problem List:    Patient Active Problem List   Diagnosis Code    Hypertension I10    Type 2 diabetes mellitus without complication ([facility] [address] A46.9    Other persistent atrial fibrillation I48.19    RBBB I45.10    History of nuclear stress test Z92.89    History of echocardiogram Z92.89    Hyperlipidemia E78.5    DALTON (obstructive sleep apnea) G47.33    Atrial fibrillation with RVR (HCC) I48.91       Past Medical History:        Diagnosis Date    Arthritis     Atrial fibrillation (HCC)     Cancer ([facility] [address]     skin    Diabetes mellitus ([facility] [address]     Hyperlipidemia     Hypertension     DALTON (obstructive sleep apnea)     RBBB        Past Surgical History:        Procedure Laterality Date    CARDIOVASCULAR STRESS TEST      COLONOSCOPY      HAND SURGERY  1977    KNEE SURGERY  1999    NECK SURGERY  1980    TONSILLECTOMY  1957       Social History:    Social History     Tobacco Use    Smoking status: Never Smoker    Smokeless tobacco: Never Used   Substance Use Topics    Alcohol use: No                                Counseling given: No      Vital Signs (Current):   Vitals:    05/20/20 0415 05/20/20 0645 05/20/20 0908 05/20/20 1230   BP: 111/62  125/80 119/75   Pulse: 63  75 73   Resp: 14  16 16   Temp: 36.4 °C (97.6 °F)  36.7 °C (98.1 °F) 36.8 °C (98.3 °F)   TempSrc: Temporal  Temporal Temporal   SpO2: 97%  97% 96%   Weight:  213 lb 8 oz (96.8 kg)     Height:                                                  BP Readings from Last 3 Encounters:   05/20/20 119/75   05/19/20 110/76   05/18/20 135/85       NPO Status:  > 8 hours                                                                               BMI:   Wt Readings from Last 3 Encounters:   05/20/20 213 lb 8 oz (96.8 kg)   05/19/20 215 lb (97.5 kg)   04/06/20 221 lb (100.2 kg)     Body mass index is 28.17 kg/m².     CBC:   Lab Results   Component Value Date    WBC 4.6 05/20/2020    RBC 4.72

## 2020-05-20 NOTE — PROGRESS NOTES
Physical Therapy    Facility/Department: [facility]F [facility]  Initial Assessment    NAME: Rossi Titus  : 1950  MRN: [id number]    Date of Service: 2020    Attending Provider:  Rosario Akhtar MD    Evaluating PT:  Jessica Morfin PT DPT     Room #:  0260/6514-V  Diagnosis:  AFIB  Pertinent PMHx/PSHx:  See chart   Procedure/Surgery:  NA  Precautions:  none  Equipment Needs:  No needs     SUBJECTIVE:    Pt lives with wife in a 2 story home with 1 stairs and 0 rail to enter. Pt ambulated with no device PTA. OBJECTIVE:   Initial Evaluation  Date:  Treatment Short Term/ Long Term   Goals   Was pt agreeable to Eval/treatment? yes  No goals set. Does pt have pain? L LE      Bed Mobility  Rolling: independent  Supine to sit: independent  Sit to supine: independent  Scooting: independent     Transfers Sit to stand: independent  Stand to sit: independent  Stand pivot: independent     Ambulation   100 feet with no device independent     Stair negotiation: ascended and descended 10 steps with 1 rail modified independent     AM-PAC 6 Clicks 83/86       Pt is A & O x 3   BLE ROM is WFL. BLE strength is grossly WFL. Sensation:  Pt denies numbness and tingling to extremities  Balance: sitting is good and standing with no device is good   Endurance: good    Therapeutic Exercises:  NA    Patient education  Pt educated on mobility. Patient response to education:   Pt verbalized understanding Pt demonstrated skill Pt requires further education in this area    x      ASSESSMENT:    Comments:  Pt found laying in bed agreeable to evaluation. Pt demonstrates good strength, balance and functional mobility. Treatment:  Patient practiced and was instructed in the following treatment:     Mobility as above. Pt was left laying in bed  with call light left by patient. Chair/bed alarm: no    Pt's/ family goals   1. Return home.      Patient and or family understand(s) diagnosis, prognosis, and plan
Evaluation time includes thorough review of current medical information, gathering information on past medical & social history & PLOF, completion of standardized testing, informal observation of tasks, consultation with other medical professions/disciplines, assessment of data & development of POC/goals.      Evaluation Complexity: Low     Treatment Time In: 839            Treatment Time Out:  585             Treatment Charges: Mins Units   Ther Ex  [id number]     Manual Therapy Zbigniew Xavier [id number]     ADL/Home Mgt [id number]     Neuro Re-ed 88024     Group Therapy      Orthotic manage/training  21983     Non-Billable Time     Total Timed Treatment [address], OTR/L  [id number]

## 2020-05-20 NOTE — CONSULTS
Provider, MD   Aspirin-Acetaminophen-Caffeine (EXCEDRIN PO) Take by mouth as needed    Yes Historical Provider, MD       Current Medications:    Current Facility-Administered Medications: amLODIPine (NORVASC) tablet 10 mg, 10 mg, Oral, Daily  apixaban (ELIQUIS) tablet 5 mg, 5 mg, Oral, BID  metFORMIN (GLUCOPHAGE-XR) extended release tablet 500 mg, 500 mg, Oral, Daily with breakfast  metoprolol succinate (TOPROL XL) extended release tablet 50 mg, 50 mg, Oral, Daily  sodium chloride flush 0.9 % injection 10 mL, 10 mL, Intravenous, 2 times per day  sodium chloride flush 0.9 % injection 10 mL, 10 mL, Intravenous, PRN  acetaminophen (TYLENOL) tablet 650 mg, 650 mg, Oral, Q6H PRN **OR** acetaminophen (TYLENOL) suppository 650 mg, 650 mg, Rectal, Q6H PRN  polyethylene glycol (GLYCOLAX) packet 17 g, 17 g, Oral, Daily PRN  promethazine (PHENERGAN) tablet 12.5 mg, 12.5 mg, Oral, Q6H PRN **OR** ondansetron (ZOFRAN) injection 4 mg, 4 mg, Intravenous, Q6H PRN  insulin lispro (HUMALOG) injection vial 0-6 Units, 0-6 Units, Subcutaneous, TID WC  insulin lispro (HUMALOG) injection vial 0-3 Units, 0-3 Units, Subcutaneous, Nightly  glucose (GLUTOSE) 40 % oral gel 15 g, 15 g, Oral, PRN  dextrose 50 % IV solution, 12.5 g, Intravenous, PRN  glucagon (rDNA) injection 1 mg, 1 mg, Intramuscular, PRN  dextrose 5 % solution, 100 mL/hr, Intravenous, PRN  potassium chloride (KLOR-CON M) extended release tablet 40 mEq, 40 mEq, Oral, PRN **OR** potassium bicarb-citric acid (EFFER-K) effervescent tablet 40 mEq, 40 mEq, Oral, PRN **OR** potassium chloride 10 mEq/100 mL IVPB (Peripheral Line), 10 mEq, Intravenous, PRN  nitroGLYCERIN (NITROSTAT) SL tablet 0.4 mg, 0.4 mg, Sublingual, Q5 Min PRN  traMADol (ULTRAM) tablet 50 mg, 50 mg, Oral, Q6H PRN **AND** acetaminophen (TYLENOL) tablet 325 mg, 325 mg, Oral, Q6H PRN    Allergies:  Tape [adhesive tape] and Pcn [penicillins]    Social History:     Activity Level: Denies SUAREZ at baseline   Code status: Full residual bruising   ABDOMEN: Soft, non-tender to light palpation. Bowel sounds present. MS: Good muscle strength and tone. No atrophy or abnormal movements. : Deferred  SKIN: Warm and dry no statis dermatitis or ulcers   NEURO / PSYCH: Oriented to person, place and time. Speech clear and appropriate. Follows all commands. Pleasant affect     DATA:    ECG: Wide QRS tachycardia. , RBBB   Tele strips: Irregular, CVR 70s   Diagnostic:      Intake/Output Summary (Last 24 hours) at 5/20/2020 0734  Last data filed at 5/19/2020 2255  Gross per 24 hour   Intake 480 ml   Output --   Net 480 ml       Labs:   CBC:   Recent Labs     05/19/20  1509 05/20/20  0551   WBC 6.3 4.6   HGB 15.2 13.6   HCT 47.0 43.1    134     BMP:   Recent Labs     05/19/20  1614 05/20/20  0551    145   K 4.5 4.4   CO2 23 34*   BUN 22 21   CREATININE 0.7 0.9   LABGLOM >60 >60   CALCIUM 9.1 9.5     Mag:   Recent Labs     05/18/20  1524   MG 2.1     TSH:   Recent Labs     05/18/20  1524   TSH 0.868   PT/INR:   Recent Labs     05/19/20  1509   PROTIME 15.9*   INR 1.4     CARDIAC ENZYMES:  Recent Labs     05/19/20  1614 05/19/20  1922 05/20/20  0144   TROPONINI <0.01 <0.01 <0.01     FASTING LIPID PANEL:  Lab Results   Component Value Date    CHOL 223 05/20/2020    HDL 34 05/20/2020    LDLCALC 163 05/20/2020    TRIG 128 05/20/2020     LIVER PROFILE:  Recent Labs     05/18/20  1524   AST 31   ALT 30   LABALBU 4.7     CXR 5/19/2020  NO ACUTE CARDIOPULMONARY PROCESS    Assessment and Plan to follow per Dr Deepa Naranjo. Electronically signed by Lizy Black on 5/20/2020 at 7:34 AM     I personally and independently saw and examined patient and reviewed all done pertinent laboratory data, imaging studies, ECGs and rhythm strips. I have reviewed and agree with the APN history and physical exam as documented in the above note.     Electronically signed by Joy Mills MD on 5/20/2020 at 9:20 AM      We were asked to see the patient for

## 2020-05-20 NOTE — PLAN OF CARE
Heart rate will remain in 70's -90's   No SOB lungs will remain clear and sat will remain above 92  Willnot experience any palpatations

## 2020-05-21 ENCOUNTER — TELEPHONE (OUTPATIENT)
Dept: CARDIOLOGY CLINIC | Age: 70
End: 2020-05-21

## 2020-05-21 RX ORDER — FLECAINIDE ACETATE 100 MG/1
100 TABLET ORAL 2 TIMES DAILY
Qty: 60 TABLET | Refills: 5 | Status: SHIPPED
Start: 2020-05-21 | End: 2020-05-25 | Stop reason: DRUGHIGH

## 2020-05-21 NOTE — TELEPHONE ENCOUNTER
Per Dr. Capri Schmitz, please let patient know that the Toprol XL bid is good and we are going to add Flecainide 100 mg bid and office visit on Tuesday 05/26/20.      Patient notified and instructed and apprt scheduled

## 2020-05-23 NOTE — PROGRESS NOTES
Cardiac Electrophysiology Virtual Video Visit Consultation Note    Reinier Newton  1950  Date of Service: 5/26/2020  PCP: Aron Ramirez MD  Cardiologist: Dilcia Garcia. Edita Dumont MD  Electrophysiologist: Edgar Ca DO    Reason for Consultation: Paroxysmal atrial flutter    5/26/20--VV Consultation:  Reinier Newton is a 69 yo male who I was asked to see in Cardiac Electrophysiology consultation for recurrent atrial flutter. Reinier Newton gives verbal consent for a virtual video visit from his home via my office on the [employer] platform. He has the PMHx as noted below. He was recently diagnosed with atrial flutter in March 2020. At that time he was started on Toprol XL and Eliquis for [address]. On 5/18/20 he presented to the ER secondary to SOB and dizziness. He was found to be back in atrial flutter with RVR.  4 days prior his Eliquis was discontinued by Dr. Kailee Husain secondary to an injury of his L leg with significant bruising. He was scheduled for a ISSAC/CV but spontaneously converted to sinus rhythm. He was recently started on Flecainide 50 mg BID by Dr. Edita Dumont and now is referred to me for further treatment options of his atrial flutter. He does report he is currently having some side effects related to flecainide including itching and hives. Patient Active Problem List    Diagnosis Date Noted    Atrial flutter ([facility] [address] 05/20/2020    History of nuclear stress test 05/19/2020     Overview Note:     A. Normal exercise nuclear 2020        History of echocardiogram 05/19/2020     Overview Note:     A. Normal echo 2020      Hyperlipidemia     DALTON (obstructive sleep apnea)     Other persistent atrial fibrillation 03/05/2020     Overview Note:     A.  CHADS-VASC = 3      RBBB 03/05/2020    Hypertension 09/13/2015    Type 2 diabetes mellitus without complication ([address] 79/90/1426       Past Medical History:   Diagnosis Date    Cancer ([address]     skin    Diabetes mellitus ([address]     Hyperlipidemia  Hypertension     DALTON (obstructive sleep apnea)     RBBB        Past Surgical History:   Procedure Laterality Date    CARDIOVASCULAR STRESS TEST      COLONOSCOPY      HAND SURGERY      KNEE SURGERY      NECK SURGERY      TONSILLECTOMY  34       Family History   Problem Relation Age of Onset    Diabetes Mother     Heart Attack Father 48        MI         Social History     Tobacco Use    Smoking status: Never Smoker    Smokeless tobacco: Never Used   Substance Use Topics    Alcohol use: No       Current Outpatient Medications   Medication Sig Dispense Refill    flecainide (TAMBOCOR) 50 MG tablet Take 1 tablet by mouth 2 times daily 30 tablet 0    metoprolol succinate (TOPROL XL) 50 MG extended release tablet Take 1 tablet by mouth 2 times daily 60 tablet 3    atorvastatin (LIPITOR) 40 MG tablet Take 1 tablet by mouth daily 30 tablet 2    apixaban (ELIQUIS) 5 MG TABS tablet Take 5 mg by mouth 2 times daily      loratadine (CLARITIN) 10 MG capsule Take 10 mg by mouth as needed       fluticasone (FLONASE) 50 MCG/ACT nasal spray 1 spray by Each Nostril route daily      Multiple Vitamin (MULTI VITAMIN MENS) TABS Take by mouth      Cholecalciferol (VITAMIN D3) 250 MCG (17319 UT) TABS Take 10,000 Units by mouth daily      vitamin C (ASCORBIC ACID) 500 MG tablet Take 500 mg by mouth daily      amLODIPine (NORVASC) 10 MG tablet Take 10 mg by mouth daily       metFORMIN ER (GLUCOPHAGE-XR) 500 MG XR tablet Take 500 mg by mouth daily (with breakfast)        No current facility-administered medications for this visit. Allergies   Allergen Reactions    Tape Jeniffer Bench Tape] Itching     Also bandaids    Pcn [Penicillins] Hives       ROS:   Constitutional: Negative for fever, activity change and appetite change. HENT: Negative for epistaxis, difficulty swallowing. Eyes: Negative for blurred vision or double vision.   Respiratory: Negative for cough, chest tightness, exercise. 3.The myocardial perfusion imaging was normal.    Last Echocardiogram 4/6/2020 [facility]):   No significant valvular abnormalities. Normal left ventricle size. Borderline concentric left ventricular hypertrophy. No wall motion abnormalities. Normal diastolic function. Ejection fraction is visually estimated at 65%. ECG 5/19/20: Atrial flutter-Typical, RBBB, NAD, NS-ST changes. ECG 5/19/20: SR, NAD, RBBB, 1st degree AVD, PAC's. I have independently reviewed all of the ECGs and rhythm strips per above. I have personally reviewed the laboratory, cardiac diagnostic and radiographic testing as outlined above. I have reviewed previous records noted in [address]. Impression:    1. Atrial flutter  - Typical  - recurrent  - Rate Control: Toprol XL 50 mg BID  - Rhythm Control: Flecainide 50 mg BID  - OAC: Eliquis 5 mg BID  - [id number]: 3  - BMI: 28.17 kg/m2  - HbA1c: No results found for: LABA1C  No results found for: EAG   - Sleep apnea: Yes; CPAP  - RF: HTN, T2DM, DALTON    2. HTN    3. T2DM  - Glucophage  No results found for: LABA1C  No results found for: EAG     4. HLD  - Lipitor 40 mg QD  Lab Results   Component Value Date    CHOL 223 (H) 05/20/2020     Lab Results   Component Value Date    TRIG 128 05/20/2020     Lab Results   Component Value Date    HDL 34 05/20/2020     Lab Results   Component Value Date    LDLCALC 163 (H) 05/20/2020     Lab Results   Component Value Date    LABVLDL 26 05/20/2020     No results found for: CHOLHDLRATIO     5. DALTON  - CPAP    6. cRBBB    7. H/O Skin cancer    Recommendations:    Mr. Eusebio Vicente has recurrent, typical atrial flutter which was newly diagnosed back in March 2020. There has been no evidence of atrial fibrillation. He is currently on Toprol XL, Eliquis and was just started on flecainide 50 mg BID by Dr. Deshaun Ward.   Today we discussed further treatment options of his atrial flutter including: a) continued medical therapy including AAD

## 2020-05-25 RX ORDER — FLECAINIDE ACETATE 50 MG/1
50 TABLET ORAL 2 TIMES DAILY
Qty: 30 TABLET | Refills: 0 | Status: SHIPPED
Start: 2020-05-25 | End: 2020-09-25

## 2020-05-25 NOTE — PROGRESS NOTES
[facility] Cardiac Electrophysiology    Prolonged non-face-to-face care was provided pending formal consultation. A total of 35 minutes from 10:20 am to 10:55 am was spent by myself on 5/25/2020 providing prolonged evaluation and interpretation of the patient's clinical data/information.     Malik Keenan DO  [id number] [facility] Cardiac Electrophysiology  [address] [facility]

## 2020-05-26 ENCOUNTER — VIRTUAL VISIT (OUTPATIENT)
Dept: NON INVASIVE DIAGNOSTICS | Age: 70
End: 2020-05-26
Payer: MEDICARE

## 2020-05-26 PROCEDURE — G8417 CALC BMI ABV UP PARAM F/U: HCPCS | Performed by: INTERNAL MEDICINE

## 2020-05-26 PROCEDURE — 99358 PROLONG SERVICE W/O CONTACT: CPT | Performed by: INTERNAL MEDICINE

## 2020-05-26 PROCEDURE — 99205 OFFICE O/P NEW HI 60 MIN: CPT | Performed by: INTERNAL MEDICINE

## 2020-05-26 PROCEDURE — G8427 DOCREV CUR MEDS BY ELIG CLIN: HCPCS | Performed by: INTERNAL MEDICINE

## 2020-09-04 ENCOUNTER — TELEPHONE (OUTPATIENT)
Dept: NON INVASIVE DIAGNOSTICS | Age: 70
End: 2020-09-04

## 2020-09-18 NOTE — PROGRESS NOTES
Cardiac Electrophysiology Outpatient Progress Note    Makayla Diallo  1950  Date of Service: 9/25/2020  PCP: Emmanuel Hassan MD  Cardiologist: Diana Bailey. Dayday Kaplan MD  Electrophysiologist: Colt Owen MD    Subjective:  Makayla Diallo is a 69 yo male who is seen in Cardiac Electrophysiology clinic for follow up of recurrent  atrial fibrillation and atrial flutter. He was diagnosed with atrial fibrillation in March 2020. He was noted to be in atrial flutter in May 2020 and the rhytm converted spontaneously. He was started on Flecainide which he developed some side effects. He was seen by Dr. Tea Desai. His Flecainide was discontinued and he was scheduled for AFL ablation which was postponed. He presents today in AF with CVR, he was unaware of being out of rhythm. I discussed with him at great length that he had both atrial fibrillation and flutter documented on EKGs so to proceed with atrial flutter ablation only would likely leave him at high risk to have AF in future. Risks, benefits and alternatives to AF/AFL ablation were discussed. Also we discussed about options of treatment including starting on AAD therapy (Tikosyn vs Sotalol) versus rate control strategy only. Patient Active Problem List    Diagnosis Date Noted    [facility] 05/20/2020    History of nuclear stress test 05/19/2020     Overview Note:     A. Normal exercise nuclear 2020        History of echocardiogram 05/19/2020     Overview Note:     A. Normal echo 2020      Hyperlipidemia     DALTON (obstructive sleep apnea)     Other persistent atrial fibrillation 03/05/2020     Overview Note:     A.  CHADS-VASC = 3      RBBB 03/05/2020    Hypertension 09/13/2015    Type 2 diabetes mellitus without complication ([facility] 08/70/8042       Past Medical History:   Diagnosis Date    Atrial flutter (HCC)     Cancer ([facility] [address]     skin    Diabetes mellitus ([facility] [address]     Hyperlipidemia     Hypertension     DALTON (obstructive sleep apnea)  RBBB        Past Surgical History:   Procedure Laterality Date    CARDIOVASCULAR STRESS TEST      COLONOSCOPY      HAND SURGERY      KNEE SURGERY      NECK SURGERY      TONSILLECTOMY  34       Family History   Problem Relation Age of Onset    Diabetes Mother     Heart Attack Father 48        MI         Social History     Tobacco Use    Smoking status: Never Smoker    Smokeless tobacco: Never Used   Substance Use Topics    Alcohol use: No       Current Outpatient Medications   Medication Sig Dispense Refill    POTASSIUM PO Take by mouth      metoprolol succinate (TOPROL XL) 50 MG extended release tablet Take 1 tablet by mouth 2 times daily 60 tablet 3    apixaban (ELIQUIS) 5 MG TABS tablet Take 5 mg by mouth 2 times daily      loratadine (CLARITIN) 10 MG capsule Take 10 mg by mouth as needed       fluticasone (FLONASE) 50 MCG/ACT nasal spray 1 spray by Each Nostril route daily      Multiple Vitamin (MULTI VITAMIN MENS) TABS Take by mouth      Cholecalciferol (VITAMIN D3) 250 MCG (29259 UT) TABS Take 10,000 Units by mouth daily      vitamin C (ASCORBIC ACID) 500 MG tablet Take 500 mg by mouth daily      amLODIPine (NORVASC) 10 MG tablet Take 10 mg by mouth daily       metFORMIN ER (GLUCOPHAGE-XR) 500 MG XR tablet Take 500 mg by mouth daily (with breakfast)        No current facility-administered medications for this visit. Allergies   Allergen Reactions    Tape Kentrell Lunger Tape] Itching     Also bandaids    Pcn [Penicillins] Hives       ROS:   Constitutional: Negative for fever, activity change and appetite change. HENT: Negative for epistaxis, difficulty swallowing. Eyes: Negative for blurred vision or double vision. Respiratory: Negative for cough, chest tightness, shortness of breath and wheezing. Cardiovascular: per HPI. Gastrointestinal: Negative for abdominal pain, heartburn, or blood in stool.    Genitourinary: Negative for hematuria, burning or frequency. Musculoskeletal: Negative for myalgias, stiffness, or swelling. Skin: Negative for rash, pain, or lumps. Neurological: Negative for syncope, seizures, or headaches. Psychiatric/Behavioral: Negative for confusion and agitation. The patient is not nervous/anxious. PHYSICAL EXAM:  Vitals:    09/25/20 0846   BP: 122/60   Pulse: 87   Resp: 16   Temp: 97.5 °F (36.4 °C)   TempSrc: Tympanic   Weight: 218 lb (98.9 kg)   Height: 6' 1\" (1.854 m)   Constitutional: Well-developed, no acute distress  Eyes: conjunctivae normal, no xanthelasma   Ears, Nose, Throat: oral mucosa moist, no cyanosis   CV: no JVD. IRIR. No murmurs, rubs, or gallops. PMI is nondisplaced  Lungs: clear to auscultation bilaterally, normal respiratory effort without used of accessory muscles  Abdomen: soft, non-tender, bowel sounds present, no masses or hepatomegaly   Musculoskeletal: no digital clubbing, no edema of LEs  Skin: warm, no rashes     Pertinent Labs:  CBC: No results for input(s): WBC, HGB, HCT, PLT in the last 72 hours. BMP:No results for input(s): NA, K, CL, CO2, BUN, CREATININE, CALCIUM in the last 72 hours. Invalid input(s): GLU  ABGs: No results found for: PHART, PO2ART, UHP2XQY  INR: No results for input(s): INR in the last 72 hours. BNP: No results for input(s): BNP in the last 72 hours. TSH:   Lab Results   Component Value Date    TSH 0.868 05/18/2020      Cardiac Injury Profile: No results for input(s): CKTOTAL, CKMB, CKMBINDEX, TROPONINI in the last 72 hours. Lipid Profile:   Lab Results   Component Value Date    TRIG 128 05/20/2020    HDL 34 05/20/2020    LDLCALC 163 05/20/2020    CHOL 223 05/20/2020      Hemoglobin A1C: No components found for: HGBA1C   Xray:   No orders to display     Pertinent Cardiac Testing:     Exercise Stress Test 4/6/2020 Rhoda Espinosa): 1. Exercise EKG was negative. 2 The patient experienced no chest pain with exercise.  3.The myocardial perfusion imaging was normal.    Echocardiogram technologies including cryo-ablation and contact force guided ablation. Based on this patient's individual needs, we have chosen to utilize Cryo-ablation. 2. Hypertension  - Well controlled. - On Toprol XL and Norvasc. 3. Diabetes mellitus  - On Glucophage    4. Hyperlipidemia  - On Lipitor. 5. DALTON  - On CPAP    6. RBBB  - Chronic     7. History Skin cancer    Recommendations:    1. Cryo balloon AF ablation and possible atrial flutter ablation if patient decides to move forward. 2. Cardiac CTA to further evaluate PV anatomies. 3. ISSAC on the day of the procedure. 4. Hold Eliquis the evening before the procedure and the day of the procedure. 5. If the patient wishes to pursue AAD therapy. Will check the cost of Tikosyn and Sotalol therapy. 6. In the mean time will obtain a 30 day MCOT to determine AF burden - will call with results. 7. Follow up after the procedure, otherwise follow up in 3 months. Encouraged the patient to call the office for any questions or concerns. Thank you very much to allowing me to participate in the patient's care. I have spent a total of 40 minutes with the patient and the family reviewing the above stated recommendations. And a total of >50% of that time involved face-to-face time providing counseling and or coordination of care with the other providers.     Colt Owen MD  Cardiac Electrophysiology  [facility]  [facility] [place]: [facility]  9:49 AM  9/25/2020

## 2020-09-25 ENCOUNTER — OFFICE VISIT (OUTPATIENT)
Dept: NON INVASIVE DIAGNOSTICS | Age: 70
End: 2020-09-25
Payer: MEDICARE

## 2020-09-25 ENCOUNTER — TELEPHONE (OUTPATIENT)
Dept: NON INVASIVE DIAGNOSTICS | Age: 70
End: 2020-09-25

## 2020-09-25 VITALS
TEMPERATURE: 97.5 F | DIASTOLIC BLOOD PRESSURE: 60 MMHG | HEIGHT: 73 IN | RESPIRATION RATE: 16 BRPM | WEIGHT: 218 LBS | HEART RATE: 87 BPM | BODY MASS INDEX: 28.89 KG/M2 | SYSTOLIC BLOOD PRESSURE: 122 MMHG

## 2020-09-25 PROCEDURE — G8427 DOCREV CUR MEDS BY ELIG CLIN: HCPCS | Performed by: INTERNAL MEDICINE

## 2020-09-25 PROCEDURE — 93000 ELECTROCARDIOGRAM COMPLETE: CPT | Performed by: INTERNAL MEDICINE

## 2020-09-25 PROCEDURE — 1036F TOBACCO NON-USER: CPT | Performed by: INTERNAL MEDICINE

## 2020-09-25 PROCEDURE — 3017F COLORECTAL CA SCREEN DOC REV: CPT | Performed by: INTERNAL MEDICINE

## 2020-09-25 PROCEDURE — 1123F ACP DISCUSS/DSCN MKR DOCD: CPT | Performed by: INTERNAL MEDICINE

## 2020-09-25 PROCEDURE — 4040F PNEUMOC VAC/ADMIN/RCVD: CPT | Performed by: INTERNAL MEDICINE

## 2020-09-25 PROCEDURE — G8417 CALC BMI ABV UP PARAM F/U: HCPCS | Performed by: INTERNAL MEDICINE

## 2020-09-25 PROCEDURE — 99215 OFFICE O/P EST HI 40 MIN: CPT | Performed by: INTERNAL MEDICINE

## 2020-09-25 NOTE — TELEPHONE ENCOUNTER
----- Message from Samy Muse MD sent at 2020  9:43 AM EDT -----  Patient to consider AF/AFL ablation, if he wishes to proceed please schedule for the followin. Cryo balloon AF ablation and possible atrial flutter ablation. 2. Cardiac CTA to future evaluate PV anatomies. 3. ISSAC on the day of the procedure. 4. Hold Eliquis the evening before the procedure and the day of the procedure. If he wishes not to proceed with ablation, please check the cost of Tikosyn and Sotalol. Thank you very much.

## 2020-11-03 ENCOUNTER — HOSPITAL ENCOUNTER (OUTPATIENT)
Age: 70
Discharge: HOME | End: 2020-11-05
Payer: MEDICARE

## 2020-11-03 ENCOUNTER — HOSPITAL ENCOUNTER (OUTPATIENT)
Dept: ULTRASOUND IMAGING | Age: 70
Discharge: HOME | End: 2020-11-05
Payer: MEDICARE

## 2020-11-03 PROCEDURE — 76536 US EXAM OF HEAD AND NECK: CPT

## 2020-11-04 ENCOUNTER — TELEPHONE (OUTPATIENT)
Dept: NON INVASIVE DIAGNOSTICS | Age: 70
End: 2020-11-04

## 2020-11-04 NOTE — TELEPHONE ENCOUNTER
I gave him 2 options during last office visit. First one was cryo balloon AF ablation and possible atrial flutter ablation. Second one was AAD therapy with Tikosyn or Sotalol. But after the monitor result came back which showed only 1 % AF burden, he has another option is to continue to observe for now and if it is getting worse he can pursue ablation or AAD therapy. He should also have appointment to see me back in 3 months per last office note. Thanks.

## 2020-11-04 NOTE — TELEPHONE ENCOUNTER
----- Message from Bianca Sands MD sent at 11/4/2020 11:57 AM EST -----  Please let him know there was an PAF with burden of 1% noted on the monitor.  Thanks.  ----- Message -----  From: Dorys Paz  Sent: 11/4/2020  10:52 AM EST  To: Bianca Sands MD

## 2020-11-04 NOTE — TELEPHONE ENCOUNTER
Spoke with the patient and gave the information. He wanted to know when he should follow up with you and if you were still considering doing the AF ablation. Please advise.

## 2020-11-05 NOTE — TELEPHONE ENCOUNTER
Spoke to the patient and he said that he wanted to just watch and see what happens. Told him that I would call him for a January appointment with CK. He verbalized understanding.

## 2021-01-17 NOTE — PROGRESS NOTES
Cardiac Electrophysiology Outpatient Progress Note    Tam Mcmillan  1950  Date of Service: 1/20/2021  PCP: Ivana Garcia MD  Cardiologist: Meron Estrada. James Smart MD  Electrophysiologist: Karime Amezcua MD    Subjective:  Tam Mcmillan is a 65 y.o. yo male who is seen in Cardiac Electrophysiology clinic for follow up of recurrent  atrial fibrillation and atrial flutter. Mr. Lance Holman reports feeling overall well and is unable to tell when he is out of rhythm. Since last office visit, he underwent 30 day cardiac monitor which showed paroxysmal atrial fibrillation and flutter with burden of 1%. He presents today in NSR. I again discussed with him at great length regarding options of treatment including continue to observe due to low AF burden versus starting on AAD therapy versus AF/AFL ablation. I also discussed with him at great length that if he decides to proceed with the ablation, he would benefit from both AF and AFL ablation due to he had both arrhythmias documented on EKGs so to proceed with atrial flutter ablation only would likely leave him at high risk to have AF in th future. He opts to continue to observe with rate control strategy at this time. The patient denies any chest pain, dyspnea, palpitations, dizziness, syncope, orthopnea or paroxysmal nocturnal dyspnea. Patient Active Problem List    Diagnosis Date Noted    [facility]    History of nuclear stress test 05/19/2020     Overview Note:     A. Normal exercise nuclear 2020        History of echocardiogram 05/19/2020     Overview Note:     A. Normal echo 2020      Hyperlipidemia     DALTON (obstructive sleep apnea)     Other persistent atrial fibrillation [facility] 03/05/2020     Overview Note:     A.  CHADS-VASC = 3      RBBB 03/05/2020    Hypertension 09/13/2015    Type 2 diabetes mellitus without complication ([facility] 68/75/0863       Past Medical History:   Diagnosis Date    [facility]     Cancer ([facility]     skin    [facility]     Hyperlipidemia     Hypertension     DALTON (obstructive sleep apnea)     RBBB        Past Surgical History:   Procedure Laterality Date    CARDIOVASCULAR STRESS TEST      COLONOSCOPY      HAND SURGERY      KNEE SURGERY      NECK SURGERY      TONSILLECTOMY  34       Family History   Problem Relation Age of Onset    Diabetes Mother     Heart Attack Father 48        MI         Social History     Tobacco Use    Smoking status: Never Smoker    Smokeless tobacco: Never Used   Substance Use Topics    Alcohol use: No       Current Outpatient Medications   Medication Sig Dispense Refill    HYDROcodone-acetaminophen (NORCO) 5-325 MG per tablet TAKE ONE TABLET BY MOUTH TWICE DAILY AS NEEDED      meloxicam (MOBIC) 15 MG tablet TAKE 1 TABLET BY MOUTH EVERY DAY      POTASSIUM PO Take by mouth      metoprolol succinate (TOPROL XL) 50 MG extended release tablet Take 1 tablet by mouth 2 times daily 60 tablet 3    apixaban (ELIQUIS) 5 MG TABS tablet Take 5 mg by mouth 2 times daily      loratadine (CLARITIN) 10 MG capsule Take 10 mg by mouth as needed       fluticasone (FLONASE) 50 MCG/ACT nasal spray 1 spray by Each Nostril route daily      Multiple Vitamin (MULTI VITAMIN MENS) TABS Take by mouth      Cholecalciferol (VITAMIN D3) 250 MCG (47785 UT) TABS Take 10,000 Units by mouth daily      vitamin C (ASCORBIC ACID) 500 MG tablet Take 500 mg by mouth daily      amLODIPine (NORVASC) 10 MG tablet Take 10 mg by mouth daily       metFORMIN ER (GLUCOPHAGE-XR) 500 MG XR tablet Take 500 mg by mouth daily (with breakfast)        No current facility-administered medications for this visit. Allergies   Allergen Reactions    Tape [place] Tape] Itching     Also bandaids    Pcn [Penicillins] Hives       ROS:   Constitutional: Negative for fever, activity change and appetite change. HENT: Negative for epistaxis, difficulty swallowing. Eyes: Negative for blurred vision or double vision. Respiratory: Negative for cough, chest tightness, shortness of breath and wheezing. Cardiovascular: per HPI. Gastrointestinal: Negative for abdominal pain, heartburn, or blood in stool. Genitourinary: Negative for hematuria, burning or frequency. Musculoskeletal: Negative for myalgias, stiffness, or swelling. Skin: Negative for rash, pain, or lumps. Neurological: Negative for syncope, seizures, or headaches. Psychiatric/Behavioral: Negative for confusion and agitation. The patient is not nervous/anxious. PHYSICAL EXAM:  Vitals:    01/20/21 0814   BP: 132/78   Pulse: 62   Resp: 14   Temp: 96.9 °F (36.1 °C)   TempSrc: Temporal   Weight: 228 lb (103.4 kg)   Height: 6' 1\" (1.854 m)   Constitutional: Well-developed, no acute distress  Eyes: conjunctivae normal, no xanthelasma   Ears, Nose, Throat: oral mucosa moist, no cyanosis   CV: no JVD. RRR. Normal S1S2. No murmurs, rubs, or gallops. PMI is nondisplaced  Lungs: clear to auscultation bilaterally, normal respiratory effort without used of accessory muscles  Abdomen: soft, non-tender, bowel sounds present, no masses or hepatomegaly   Musculoskeletal: no digital clubbing, no edema of LEs  Skin: warm, no rashes     Pertinent Labs:  CBC: No results for input(s): WBC, HGB, HCT, PLT in the last 72 hours. BMP:No results for input(s): NA, K, CL, CO2, BUN, CREATININE, CALCIUM in the last 72 hours. Invalid input(s): GLU  ABGs: No results found for: PHART, PO2ART, [id number]  INR: No results for input(s): INR in the last 72 hours. BNP: No results for input(s): BNP in the last 72 hours. TSH:   Lab Results   Component Value Date    TSH 0.868 05/18/2020      Cardiac Injury Profile: No results for input(s): CKTOTAL, CKMB, CKMBINDEX, TROPONINI in the last 72 hours.    Lipid Profile:   Lab Results   Component Value Date    TRIG 128 05/20/2020    HDL 34 05/20/2020    LDLCALC 163 05/20/2020    CHOL 223 05/20/2020 Hemoglobin A1C: No components found for: HGBA1C   Xray:   No orders to display     Pertinent Cardiac Testing:     Exercise Stress Test 2020 [employer]): 1. Exercise EKG was negative. 2 The patient experienced no chest pain with exercise. 3.The myocardial perfusion imaging was normal.    Echocardiogram 2020 [employer]):   No significant valvular abnormalities. Normal left ventricle size. Borderline concentric left ventricular hypertrophy. No wall motion abnormalities. Normal diastolic function. Ejection fraction is visually estimated at 65%. EKG 3/5/20: Atrial fibrillation/atypical atrial flutter      EKG 20: Atrial flutter-Typical, RBBB, NAD, NS-ST changes. EK21: NSR, RBBB, rate: 62bpm - [facility]    Impression:    1. Atrial fibrillation and atrial flutter  - Typical and atypical atrial flutter/atrial fibrillation  - Recurrent  - Rate Control: Toprol XL 50 mg BID  - Rhythm Control: Flecainide 50 mg BID - stopped shortly after starting.  - OAC: Eliquis 5 mg BID  - [id number]: 3  - BMI: 28.17 kg/m2  - HbA1c: No results found for: LABA1C  No results found for: EAG   - Sleep apnea: Yes; CPAP  - RF: HTN, T2DM, DALTON  - Thirty day monitor 20 showed AF burden of < 1%.  - I discussed with him at great length regarding options of treatment including continue to observe due to low AF burden versus starting on AAD therapy versus AF/AFL ablation. I also discussed with him at great length that if he decides to proceed with the ablation, he would benefit from both AF and AFL ablation due to he had both arrhythmias documented on EKGs so to proceed with atrial flutter ablation only would likely leave him at high risk to have AF in th future. He opts to continue to observe with rate control strategy at this time. 2. Hypertension  - Well controlled. - On Toprol XL and Norvasc. 3. Diabetes mellitus  - On Glucophage    4. Hyperlipidemia  - On Lipitor. 5. DALTON  - On CPAP    6.  RBBB  - Chronic     7. History skin cancer    Recommendations:    1. Patient opts to continue rate control strategy at this time. 2. Continue Toprol XL 50 mg bid. 3. Continue Eliqius 5 mg bid. 4. Patient to consider AAD therapy versus cryo balloon AF ablation and possible atrial flutter ablation if arrhythmias recur. 5. Follow up in 3 months. Encouraged the patient to call the office for any questions or concerns. Thank you very much to allowing me to participate in the patient's care. I have spent a total of 40 minutes with the patient and the family reviewing the above stated recommendations. And a total of >50% of that time involved face-to-face time providing counseling and or coordination of care with the other providers.     Hayden Stern MD  Cardiac Electrophysiology  [address]  [facility] [place]  8:59 AM  1/20/2021

## 2021-01-20 ENCOUNTER — OFFICE VISIT (OUTPATIENT)
Dept: NON INVASIVE DIAGNOSTICS | Age: 71
End: 2021-01-20
Payer: MEDICARE

## 2021-01-20 VITALS
TEMPERATURE: 96.9 F | RESPIRATION RATE: 14 BRPM | BODY MASS INDEX: 30.22 KG/M2 | HEART RATE: 62 BPM | DIASTOLIC BLOOD PRESSURE: 78 MMHG | WEIGHT: 228 LBS | HEIGHT: 73 IN | SYSTOLIC BLOOD PRESSURE: 132 MMHG

## 2021-01-20 DIAGNOSIS — I10 ESSENTIAL HYPERTENSION: Primary | ICD-10-CM

## 2021-01-20 PROCEDURE — 3017F COLORECTAL CA SCREEN DOC REV: CPT | Performed by: INTERNAL MEDICINE

## 2021-01-20 PROCEDURE — 4040F PNEUMOC VAC/ADMIN/RCVD: CPT | Performed by: INTERNAL MEDICINE

## 2021-01-20 PROCEDURE — 1036F TOBACCO NON-USER: CPT | Performed by: INTERNAL MEDICINE

## 2021-01-20 PROCEDURE — G8427 DOCREV CUR MEDS BY ELIG CLIN: HCPCS | Performed by: INTERNAL MEDICINE

## 2021-01-20 PROCEDURE — G8484 FLU IMMUNIZE NO ADMIN: HCPCS | Performed by: INTERNAL MEDICINE

## 2021-01-20 PROCEDURE — G8417 CALC BMI ABV UP PARAM F/U: HCPCS | Performed by: INTERNAL MEDICINE

## 2021-01-20 PROCEDURE — 1123F ACP DISCUSS/DSCN MKR DOCD: CPT | Performed by: INTERNAL MEDICINE

## 2021-01-20 PROCEDURE — 99215 OFFICE O/P EST HI 40 MIN: CPT | Performed by: INTERNAL MEDICINE

## 2021-01-20 PROCEDURE — 93000 ELECTROCARDIOGRAM COMPLETE: CPT | Performed by: INTERNAL MEDICINE

## 2021-01-20 RX ORDER — HYDROCODONE BITARTRATE AND ACETAMINOPHEN 5; 325 MG/1; MG/1
TABLET ORAL
COMMUNITY
Start: 2020-11-03

## 2021-01-20 RX ORDER — MELOXICAM 15 MG/1
TABLET ORAL
COMMUNITY
Start: 2020-12-31

## 2021-02-03 ENCOUNTER — IMMUNIZATION (OUTPATIENT)
Dept: PRIMARY CARE CLINIC | Age: 71
End: 2021-02-03
Payer: MEDICARE

## 2021-02-03 DIAGNOSIS — Z23 NEED FOR VACCINATION: Primary | ICD-10-CM

## 2021-02-03 PROCEDURE — 0001A COVID-19, PFIZER VACCINE 30MCG/0.3ML DOSE: CPT | Performed by: NURSE PRACTITIONER

## 2021-02-03 PROCEDURE — 91300 COVID-19, PFIZER VACCINE 30MCG/0.3ML DOSE: CPT | Performed by: NURSE PRACTITIONER

## 2021-02-24 ENCOUNTER — IMMUNIZATION (OUTPATIENT)
Dept: PRIMARY CARE CLINIC | Age: 71
End: 2021-02-24
Payer: MEDICARE

## 2021-02-24 PROCEDURE — 91300 COVID-19, PFIZER VACCINE 30MCG/0.3ML DOSE: CPT | Performed by: NURSE PRACTITIONER

## 2021-02-24 PROCEDURE — 0002A COVID-19, PFIZER VACCINE 30MCG/0.3ML DOSE: CPT | Performed by: NURSE PRACTITIONER

## 2021-04-20 NOTE — PROGRESS NOTES
Cardiac Electrophysiology Outpatient Progress Note    Anastacio Villanueva  1950  Date of Service: 4/21/2021  PCP: Margo Thomas MD  Cardiologist: Karla Betancourt MD  Electrophysiologist: Ady Jacques MD    Subjective:  Anastacio Villanueva is a 65 y.o. yo male who is seen in Cardiac Electrophysiology clinic for follow up of paroxysmal atrial fibrillation and atrial flutter. Since last office visit Mr. Sajan Boothe opts to continue rate control treatment. He states that he feels overall well. He denies any recurrent palpitations. He presents today in SR. He remains on Toprol XL for rate control and Eliquis for stroke risk reduction. The patient denies any chest pain, dyspnea, palpitations, dizziness, syncope, orthopnea or paroxysmal nocturnal dyspnea. Options of AF/AFL ablation or AAD therapy again discussed. He opts to continue on rate control treatment for now. Patient Active Problem List    Diagnosis Date Noted    Atrial flutter ([address] 05/20/2020    History of nuclear stress test 05/19/2020     Overview Note:     A. Normal exercise nuclear 2020        History of echocardiogram 05/19/2020     Overview Note:     A. Normal echo 2020      Hyperlipidemia     DALTON (obstructive sleep apnea)     Other persistent atrial fibrillation ([address] 03/05/2020     Overview Note:     A.  CHADS-VASC = 3      RBBB 03/05/2020    Hypertension 09/13/2015    Type 2 diabetes mellitus without complication ([address] 11/43/2312       Past Medical History:   Diagnosis Date    Atrial flutter ([address]     Cancer ([address]     skin    Diabetes mellitus ([address]     Hyperlipidemia     Hypertension     DALTON (obstructive sleep apnea)     RBBB        Past Surgical History:   Procedure Laterality Date    CARDIOVASCULAR STRESS TEST      COLONOSCOPY      HAND SURGERY  1977    KNEE SURGERY  1999    NECK SURGERY  1980    TONSILLECTOMY  34       Family History   Problem Relation Age of Onset    Diabetes Mother     Heart Attack Father 48        MI         Social History     Tobacco Use    Smoking status: Never Smoker    Smokeless tobacco: Never Used   Substance Use Topics    Alcohol use: No       Current Outpatient Medications   Medication Sig Dispense Refill    HYDROcodone-acetaminophen (NORCO) 5-325 MG per tablet TAKE ONE TABLET BY MOUTH TWICE DAILY AS NEEDED      meloxicam (MOBIC) 15 MG tablet TAKE 1 TABLET BY MOUTH EVERY DAY      POTASSIUM PO Take by mouth      metoprolol succinate (TOPROL XL) 50 MG extended release tablet Take 1 tablet by mouth 2 times daily 60 tablet 3    apixaban (ELIQUIS) 5 MG TABS tablet Take 5 mg by mouth 2 times daily      loratadine (CLARITIN) 10 MG capsule Take 10 mg by mouth as needed       fluticasone (FLONASE) 50 MCG/ACT nasal spray 1 spray by Each Nostril route daily      Multiple Vitamin (MULTI VITAMIN MENS) TABS Take by mouth      Cholecalciferol (VITAMIN D3) 250 MCG (08387 UT) TABS Take 10,000 Units by mouth daily      vitamin C (ASCORBIC ACID) 500 MG tablet Take 500 mg by mouth daily      amLODIPine (NORVASC) 10 MG tablet Take 10 mg by mouth daily       metFORMIN ER (GLUCOPHAGE-XR) 500 MG XR tablet Take 500 mg by mouth daily (with breakfast)        No current facility-administered medications for this visit. Allergies   Allergen Reactions    Tape L[place] Tape] Itching     Also bandaids    Pcn [Penicillins] Hives       ROS:   Constitutional: Negative for fever, activity change and appetite change. HENT: Negative for epistaxis, difficulty swallowing. Eyes: Negative for blurred vision or double vision. Respiratory: Negative for cough, chest tightness, shortness of breath and wheezing. Cardiovascular: per HPI. Gastrointestinal: Negative for abdominal pain, heartburn, or blood in stool. Genitourinary: Negative for hematuria, burning or frequency. Musculoskeletal: Negative for myalgias, stiffness, or swelling. Skin: Negative for rash, pain, or lumps.    Neurological: Negative for syncope, seizures, or headaches. Psychiatric/Behavioral: Negative for confusion and agitation. The patient is not nervous/anxious. PHYSICAL EXAM:  Vitals:    04/21/21 0838   BP: 138/84   Pulse: 59   Resp: 18   Weight: 220 lb (99.8 kg)   Height: 6' 1\" (1.854 m)      Constitutional: Well-developed, no acute distress  Eyes: conjunctivae normal, no xanthelasma   Ears, Nose, Throat: oral mucosa moist, no cyanosis   CV: no JVD. RRR. Normal S1S2. No murmurs, rubs, or gallops. PMI is nondisplaced  Lungs: clear to auscultation bilaterally, normal respiratory effort without used of accessory muscles  Abdomen: soft, non-tender, bowel sounds present, no masses or hepatomegaly   Musculoskeletal: no digital clubbing, no edema of LEs  Skin: warm, no rashes     Pertinent Labs:  CBC: No results for input(s): WBC, HGB, HCT, PLT in the last 72 hours. BMP:No results for input(s): NA, K, CL, CO2, BUN, CREATININE, CALCIUM in the last 72 hours. Invalid input(s): GLU  ABGs: No results found for: PHART, PO2ART, [id number]  INR: No results for input(s): INR in the last 72 hours. BNP: No results for input(s): BNP in the last 72 hours. TSH:   Lab Results   Component Value Date    TSH 0.868 05/18/2020      Cardiac Injury Profile: No results for input(s): CKTOTAL, CKMB, CKMBINDEX, TROPONINI in the last 72 hours. Lipid Profile:   Lab Results   Component Value Date    TRIG 128 05/20/2020    HDL 34 05/20/2020    LDLCALC 163 05/20/2020    CHOL 223 05/20/2020      Hemoglobin A1C: No components found for: HGBA1C   Xray:   No orders to display     Pertinent Cardiac Testing:     Exercise Stress Test 4/6/2020 Elaina Ferrer): 1. Exercise EKG was negative. 2 The patient experienced no chest pain with exercise.  3.The myocardial perfusion imaging was normal.    Echocardiogram 4/6/2020 Elaina Ferrer):   No significant valvular abnormalities. Normal left ventricle size. Borderline concentric left ventricular hypertrophy. No wall motion abnormalities. Normal diastolic function. Ejection fraction is visually estimated at 65%. EKG 3/5/20: Atrial fibrillation/atypical atrial flutter      EKG 20: Atrial flutter-Typical, RBBB, NAD, NS-ST changes. EK21: SR, RBBB, rate: 59 bpm - see scanned caridology    Impression:    1. Paroxysmal atrial fibrillation and atrial flutter  - Typical and atypical atrial flutter/atrial fibrillation  - [id number]: 3 (age, HTN, DM)  - On Toprol XL for rate control.  - Briefly on Flecainide for rhythm control and discontinued. - On Eliquis for stroke risk reduction.  - Thirty day monitor 20 showed AF burden of < 1%.  - I discussed with him at great length regarding options of treatment including continue to observe due to low AF burden versus starting on AAD therapy versus AF/AFL ablation. He opts to continue to observe with rate control strategy at this time. - Re-education on importance of well controlled HTN (goal BP < 130/80), adequate weight control (goal BMI of < 27), physical activity consisting of moderate cardiopulmonary exercise up to a goal of 250 min/wk, daily compliance with CPAP in treating sleep apnea, smoking cessation and limited ETOH intake. 2. Hypertension  - Controlled. - On Toprol XL and Norvasc. 3. Diabetes mellitus  - On Glucophage    4. Hyperlipidemia  - Unable to tolerate statins.   - Managed by PCP. 5. DALTON  - Report not using CPAP.  - Encourage compliance. 6. RBBB  - Chronic     7. History skin cancer    Recommendations:    1. Patient opts to continue rate control strategy at this time. 2. Continue Toprol XL 50 mg bid. 3. Continue Eliqius 5 mg bid. 4. Patient to consider AAD therapy versus cryo balloon AF ablation and possible atrial flutter ablation if arrhythmias recur. 5. Risk factor modifications as above. 6. Follow up in 6 months or sooner PRN. Encouraged the patient to call the office for any questions or concerns.     Thank you very much to allowing me to participate in the patient's care. I have spent a total of 40 minutes with the patient and the family reviewing the above stated recommendations. And a total of >50% of that time involved face-to-face time providing counseling and or coordination of care with the other providers, preparation for the clinic visit, reviewing records/tests, counseling/education of the patient, ordering medications/tests/procedures, coordinating care, and documenting clinical information in the EHR.      Linda Head MD  Cardiac Electrophysiology  [address]   [facility] [place]  8:50 AM  4/21/2021

## 2021-04-21 ENCOUNTER — OFFICE VISIT (OUTPATIENT)
Dept: NON INVASIVE DIAGNOSTICS | Age: 71
End: 2021-04-21
Payer: MEDICARE

## 2021-04-21 VITALS
HEART RATE: 59 BPM | DIASTOLIC BLOOD PRESSURE: 84 MMHG | WEIGHT: 220 LBS | RESPIRATION RATE: 18 BRPM | BODY MASS INDEX: 29.16 KG/M2 | HEIGHT: 73 IN | SYSTOLIC BLOOD PRESSURE: 138 MMHG

## 2021-04-21 DIAGNOSIS — I48.3 TYPICAL ATRIAL FLUTTER (HCC): Primary | ICD-10-CM

## 2021-04-21 PROCEDURE — 99215 OFFICE O/P EST HI 40 MIN: CPT | Performed by: INTERNAL MEDICINE

## 2021-04-21 PROCEDURE — 1123F ACP DISCUSS/DSCN MKR DOCD: CPT | Performed by: INTERNAL MEDICINE

## 2021-04-21 PROCEDURE — 4040F PNEUMOC VAC/ADMIN/RCVD: CPT | Performed by: INTERNAL MEDICINE

## 2021-04-21 PROCEDURE — G8427 DOCREV CUR MEDS BY ELIG CLIN: HCPCS | Performed by: INTERNAL MEDICINE

## 2021-04-21 PROCEDURE — G8417 CALC BMI ABV UP PARAM F/U: HCPCS | Performed by: INTERNAL MEDICINE

## 2021-04-21 PROCEDURE — 3017F COLORECTAL CA SCREEN DOC REV: CPT | Performed by: INTERNAL MEDICINE

## 2021-04-21 PROCEDURE — 93000 ELECTROCARDIOGRAM COMPLETE: CPT | Performed by: INTERNAL MEDICINE

## 2021-04-21 PROCEDURE — 1036F TOBACCO NON-USER: CPT | Performed by: INTERNAL MEDICINE

## 2021-12-22 NOTE — PROGRESS NOTES
TEST      COLONOSCOPY      HAND SURGERY      KNEE SURGERY      NECK SURGERY      TONSILLECTOMY  1959       Family History   Problem Relation Age of Onset    Diabetes Mother     Heart Attack Father 48        MI         Social History     Tobacco Use    Smoking status: Never Smoker    Smokeless tobacco: Never Used   Substance Use Topics    Alcohol use: No       Current Outpatient Medications   Medication Sig Dispense Refill    HYDROcodone-acetaminophen (NORCO) 5-325 MG per tablet TAKE ONE TABLET BY MOUTH TWICE DAILY AS NEEDED      meloxicam (MOBIC) 15 MG tablet TAKE 1 TABLET BY MOUTH EVERY DAY      metoprolol succinate (TOPROL XL) 50 MG extended release tablet Take 1 tablet by mouth 2 times daily 60 tablet 3    apixaban (ELIQUIS) 5 MG TABS tablet Take 5 mg by mouth 2 times daily      loratadine (CLARITIN) 10 MG capsule Take 10 mg by mouth as needed       fluticasone (FLONASE) 50 MCG/ACT nasal spray 1 spray by Each Nostril route daily      Multiple Vitamin (MULTI VITAMIN MENS) TABS Take by mouth      Cholecalciferol (VITAMIN D3) 250 MCG (96255 UT) TABS Take 10,000 Units by mouth daily      vitamin C (ASCORBIC ACID) 500 MG tablet Take 500 mg by mouth daily      amLODIPine (NORVASC) 10 MG tablet Take 10 mg by mouth daily       metFORMIN ER (GLUCOPHAGE-XR) 500 MG XR tablet Take 500 mg by mouth daily (with breakfast)       POTASSIUM PO Take by mouth (Patient not taking: Reported on 2021)       No current facility-administered medications for this visit. Allergies   Allergen Reactions    Tape Kelsey Constant Tape] Itching     Also bandaids    Pcn [Penicillins] Hives       ROS:   Constitutional: Negative for fever, activity change and appetite change. HENT: Negative for epistaxis, difficulty swallowing. Eyes: Negative for blurred vision or double vision. Respiratory: Negative for cough, chest tightness, shortness of breath and wheezing.    Cardiovascular: per HPI.  Gastrointestinal: Negative for abdominal pain, heartburn, or blood in stool. Genitourinary: Negative for hematuria, burning or frequency. Musculoskeletal: Negative for myalgias, stiffness, or swelling. Skin: Negative for rash, pain, or lumps. Neurological: Negative for syncope, seizures, or headaches. Psychiatric/Behavioral: Negative for confusion and agitation. The patient is not nervous/anxious. PHYSICAL EXAM:  Vitals:    12/23/21 1004   BP: 128/86   Site: Left Upper Arm   Position: Sitting   Cuff Size: Medium Adult   Pulse: 61   Resp: 18   Weight: 219 lb (99.3 kg)   Height: 6' 1\" (1.854 m)      Constitutional: Well-developed, no acute distress  Eyes: conjunctivae normal, no xanthelasma   Ears, Nose, Throat: oral mucosa moist, no cyanosis   CV: no JVD. RRR. Normal S1S2. No murmurs, rubs, or gallops. PMI is nondisplaced  Lungs: clear to auscultation bilaterally, normal respiratory effort without used of accessory muscles  Abdomen: soft, non-tender, bowel sounds present, no masses or hepatomegaly   Musculoskeletal: no digital clubbing, no edema of LEs  Skin: warm, no rashes     Pertinent Labs:  CBC: No results for input(s): WBC, HGB, HCT, PLT in the last 72 hours. BMP:No results for input(s): NA, K, CL, CO2, BUN, CREATININE, GLU, CALCIUM in the last 72 hours. ABGs: No results found for: PHART, PO2ART, CJS0OUA  INR: No results for input(s): INR in the last 72 hours. BNP: No results for input(s): BNP in the last 72 hours. TSH:   Lab Results   Component Value Date    TSH 0.868 05/18/2020      Cardiac Injury Profile: No results for input(s): CKTOTAL, CKMB, CKMBINDEX, TROPONINI in the last 72 hours.    Lipid Profile:   Lab Results   Component Value Date    TRIG 128 05/20/2020    HDL 34 05/20/2020    LDLCALC 163 05/20/2020    CHOL 223 05/20/2020      Hemoglobin A1C: No components found for: HGBA1C   Xray:   No orders to display     Pertinent Cardiac Testing:     Exercise Stress Test 4/6/2020 (Mike Montano): 1. Exercise EKG was negative. 2 The patient experienced no chest pain with exercise. 3.The myocardial perfusion imaging was normal.    Echocardiogram 2020 [facility]):   No significant valvular abnormalities. Normal left ventricle size. Borderline concentric left ventricular hypertrophy. No wall motion abnormalities. Normal diastolic function. Ejection fraction is visually estimated at 65%. EKG 3/5/20: Atrial fibrillation/atypical atrial flutter      EKG 20: Atrial flutter-Typical, RBBB, NAD, NS-ST changes. EK21: NSR , RBBB, rate: 61 bpm,  ms, QTc 423 ms - see scanned caridology    Impression:    1. Paroxysmal atrial fibrillation and atrial flutter  - Typical and atypical atrial flutter/atrial fibrillation  - [id number]: 3 (age, HTN, DM)  - On Toprol XL for rate control.  - Briefly on Flecainide for rhythm control and discontinued. - On Eliquis for stroke risk reduction.  - Thirty day monitor 20 showed AF burden of < 1%.  - Presents in NSR.  - Discussed with him regarding AAD therapy versus cryo balloon AF ablation and possible atrial flutter ablation if arrhythmias recur.  - Re-education on importance of well controlled HTN (goal BP < 130/80), adequate weight control (goal BMI of < 27), physical activity consisting of moderate cardiopulmonary exercise up to a goal of 250 min/wk, daily compliance with CPAP in treating sleep apnea, smoking cessation and limited ETOH intake. 2. Hypertension  - Controlled. - On Toprol XL and Norvasc. 3. Diabetes mellitus  - On Glucophage    4. Hyperlipidemia  - Unable to tolerate statins.   - Managed by PCP. 5. DALTON  - Report not using CPAP.  - Encourage compliance. 6. RBBB  - Chronic   - QRS:136 msec    7. History skin cancer    Recommendations:    1. Patient opts to continue rate control strategy at this time. 2. Continue Toprol XL 50 mg bid. 3. Continue Eliqius 5 mg bid.   4. Patient to consider AAD therapy versus cryo balloon AF ablation and possible atrial flutter ablation if arrhythmias recur. 5. Risk factor modifications as above. 6. Follow up in 6 months or sooner PRN. Encouraged the patient to call the office for any questions or concerns. Thank you very much to allowing me to participate in the patient's care. I have spent a total of 40 minutes with the patient and the family reviewing the above stated recommendations. And a total of >50% of that time involved face-to-face time providing counseling and or coordination of care with the other providers, preparation for the clinic visit, reviewing records/tests, counseling/education of the patient, ordering medications/tests/procedures, coordinating care, and documenting clinical information in the EHR.      Cam Prabhakar MD  Cardiac Electrophysiology  [address]  [facility] [place]  12/23/21   10:20 AM

## 2021-12-23 ENCOUNTER — OFFICE VISIT (OUTPATIENT)
Dept: NON INVASIVE DIAGNOSTICS | Age: 71
End: 2021-12-23
Payer: MEDICARE

## 2021-12-23 ENCOUNTER — TELEPHONE (OUTPATIENT)
Dept: NON INVASIVE DIAGNOSTICS | Age: 71
End: 2021-12-23

## 2021-12-23 VITALS
WEIGHT: 219 LBS | SYSTOLIC BLOOD PRESSURE: 128 MMHG | HEIGHT: 73 IN | DIASTOLIC BLOOD PRESSURE: 86 MMHG | HEART RATE: 61 BPM | BODY MASS INDEX: 29.03 KG/M2 | RESPIRATION RATE: 18 BRPM

## 2021-12-23 DIAGNOSIS — I48.3 TYPICAL ATRIAL FLUTTER (HCC): Primary | ICD-10-CM

## 2021-12-23 PROBLEM — M48.061 SPINAL STENOSIS OF LUMBAR REGION: Status: ACTIVE | Noted: 2019-08-13

## 2021-12-23 PROBLEM — I48.92 ATRIAL FLUTTER BY ELECTROCARDIOGRAPHY (HCC): Status: ACTIVE | Noted: 2021-12-23

## 2021-12-23 PROBLEM — M79.659 THIGH PAIN: Status: ACTIVE | Noted: 2021-12-23

## 2021-12-23 PROCEDURE — 93000 ELECTROCARDIOGRAM COMPLETE: CPT | Performed by: INTERNAL MEDICINE

## 2021-12-23 PROCEDURE — 1123F ACP DISCUSS/DSCN MKR DOCD: CPT | Performed by: INTERNAL MEDICINE

## 2021-12-23 PROCEDURE — G8417 CALC BMI ABV UP PARAM F/U: HCPCS | Performed by: INTERNAL MEDICINE

## 2021-12-23 PROCEDURE — 1036F TOBACCO NON-USER: CPT | Performed by: INTERNAL MEDICINE

## 2021-12-23 PROCEDURE — 4040F PNEUMOC VAC/ADMIN/RCVD: CPT | Performed by: INTERNAL MEDICINE

## 2021-12-23 PROCEDURE — 99215 OFFICE O/P EST HI 40 MIN: CPT | Performed by: INTERNAL MEDICINE

## 2021-12-23 PROCEDURE — G8482 FLU IMMUNIZE ORDER/ADMIN: HCPCS | Performed by: INTERNAL MEDICINE

## 2021-12-23 PROCEDURE — 3017F COLORECTAL CA SCREEN DOC REV: CPT | Performed by: INTERNAL MEDICINE

## 2021-12-23 PROCEDURE — G8427 DOCREV CUR MEDS BY ELIG CLIN: HCPCS | Performed by: INTERNAL MEDICINE

## 2022-03-28 ENCOUNTER — TELEPHONE (OUTPATIENT)
Dept: NON INVASIVE DIAGNOSTICS | Age: 72
End: 2022-03-28

## 2022-03-28 NOTE — TELEPHONE ENCOUNTER
----- Message from Avery Vega MD sent at 3/28/2022 10:20 AM EDT -----  Sure and restart ASAP when is safe from bleeding risk. Thanks.  ----- Message -----  From: Ruth Cooper RN  Sent: 3/28/2022   9:17 AM EDT  To: Avery Vega MD    GI requesting to hold Eliquis for 2 days prior to colonoscopy w/ anesthesia. - [id number]: 3 (age, HTN, DM)  - On Toprol XL for rate control.  - Briefly on Flecainide for rhythm control and discontinued. On rate control.  - Thirty day monitor 11/30/20 showed AF burden of < 1%. Please advise.

## 2022-08-18 ENCOUNTER — TELEPHONE (OUTPATIENT)
Dept: NON INVASIVE DIAGNOSTICS | Age: 72
End: 2022-08-18

## 2022-08-18 NOTE — TELEPHONE ENCOUNTER
The patient stated that he was at the doctor and he has been bleeding a lot.  They wanted him to get off Eliquis for 5 days please advise

## 2022-08-19 NOTE — TELEPHONE ENCOUNTER
Spoke to the patient and they found blood in his urine. Told the patient that he can be off Eliquis for 5 days but he needs to resume right after the 5 days. He verbalized understanding.

## 2022-09-20 NOTE — PROGRESS NOTES
Cardiac Electrophysiology Outpatient Progress Note    Lisa Joel  1950  Date of Service: 9/21/2022  PCP: Froylan Severin, MD  Cardiologist: Zenaida Engel. Anahi Roberts MD  Electrophysiologist: James Field MD    Subjective:  Lisa Joel is a 66 y.o. yo male who is seen in Cardiac Electrophysiology clinic for follow up of paroxysmal atrial fibrillation and atrial flutter. He was last seen 12/23/21 and was in sinus at that time. He presents today in sinus, RBBB with a rate of 65 bpm, and continues on Toprol XL. He notes 1 recurrence of atrial fibrillation within the past year occurring the week of 08/29, waking not feeling right he checked his alive core and found that he was in AF with rates in the 60s however this resolved in a number of hours. He notes ongoing fatigue and increased dyspnea upon exertion now requiring stopping after ascending a flight of stairs, this is associated with a slight lightheadedness, but no syncope, diaphoresis, nausea, vomiting. He notes ongoing noncompliance with CPAP and was instructed to follow-up with either sleep medicine or pulmonary. He notes hematuria and awaits evaluation for nephrolithiasis stopped his apixaban 5 days ago which resolved the hematuria. Use of watchman as alternative for [address] was discussed. The nature of A. fib/flutter was also discussed as well as a flutter/cryoballoon/RF PVI as well as the nature of A. fib to increase in the patient's lifetime as well as rationale for lifelong [address]. Patient Active Problem List    Diagnosis Date Noted    Atrial flutter by electrocardiography ([address] 12/23/2021    Problem 12/23/2021    Thigh pain 12/23/2021    Atrial flutter ([address] 05/20/2020    History of nuclear stress test 05/19/2020     Overview Note:     A. Normal exercise nuclear 2020        History of echocardiogram 05/19/2020     Overview Note:     A.  Normal echo 2020      Hyperlipidemia     DALTON (obstructive sleep apnea)     Other persistent atrial fibrillation ([facility] 2020     Overview Note:     A.  CHADS-VASC = 3      RBBB 2020    Spinal stenosis of lumbar region 2019    Hypertension 2015    Type 2 diabetes mellitus without complication ([facility]     Palpitations 2015       Past Medical History:   Diagnosis Date    Atrial flutter (HCC)     Cancer (HCC)     skin    Diabetes mellitus ([facility]     Hyperlipidemia     Hypertension     DALTON (obstructive sleep apnea)     RBBB        Past Surgical History:   Procedure Laterality Date    CARDIOVASCULAR STRESS TEST      COLONOSCOPY      HAND [address] 65       Family History   Problem Relation Age of Onset    Diabetes Mother     Heart Attack Father 48        MI         Social History     Tobacco Use    Smoking status: Never    Smokeless tobacco: Never   Substance Use Topics    Alcohol use: No       Current Outpatient Medications   Medication Sig Dispense Refill    HYDROcodone-acetaminophen (NORCO) 5-325 MG per tablet TAKE ONE TABLET BY MOUTH TWICE DAILY AS NEEDED      meloxicam (MOBIC) 15 MG tablet TAKE 1 TABLET BY MOUTH EVERY DAY      metoprolol succinate (TOPROL XL) 50 MG extended release tablet Take 1 tablet by mouth 2 times daily 60 tablet 3    apixaban (ELIQUIS) 5 MG TABS tablet Take 5 mg by mouth 2 times daily      loratadine (CLARITIN) 10 MG capsule Take 10 mg by mouth as needed       fluticasone (FLONASE) 50 MCG/ACT nasal spray 1 spray by Each Nostril route daily      Multiple Vitamin (MULTI VITAMIN MENS) TABS Take by mouth      Cholecalciferol (VITAMIN D3) 250 MCG (81320 UT) TABS Take 10,000 Units by mouth daily      vitamin C (ASCORBIC ACID) 500 MG tablet Take 500 mg by mouth daily      amLODIPine (NORVASC) 10 MG tablet Take 10 mg by mouth daily       metFORMIN ER (GLUCOPHAGE-XR) 500 MG XR tablet Take 500 mg by mouth daily (with breakfast)        No current facility-administered medications for this visit. Allergies   Allergen Reactions    Tape Orren Cruz Tape] Itching     Also bandaids    Pcn [Penicillins] Hives       ROS:   Constitutional: Negative for fever, activity change and appetite change. HENT: Negative for epistaxis, difficulty swallowing. Eyes: Negative for blurred vision or double vision. Respiratory: Negative for cough, chest tightness, shortness of breath and wheezing. Cardiovascular: per HPI. Gastrointestinal: Negative for abdominal pain, heartburn, or blood in stool. Genitourinary: Negative for hematuria, burning or frequency. Musculoskeletal: Negative for myalgias, stiffness, or swelling. Skin: Negative for rash, pain, or lumps. Neurological: Negative for syncope, seizures, or headaches. Psychiatric/Behavioral: Negative for confusion and agitation. The patient is not nervous/anxious. PHYSICAL EXAM:  Vitals:    09/21/22 0750   BP: 126/82   Site: Left Upper Arm   Position: Sitting   Cuff Size: Medium Adult   Pulse: 65   Resp: 16   Weight: 223 lb 6.4 oz (101.3 kg)   Height: 6' 1\" (1.854 m)        Constitutional: Well-developed, no acute distress  Eyes: conjunctivae normal, no xanthelasma   Ears, Nose, Throat: oral mucosa moist, no cyanosis   CV: no JVD. RRR. Normal S1S2. No murmurs, rubs, or gallops. PMI is nondisplaced  Lungs: clear to auscultation bilaterally, normal respiratory effort without used of accessory muscles  Abdomen: soft, non-tender, bowel sounds present, no masses or hepatomegaly   Musculoskeletal: no digital clubbing, no edema of LEs  Skin: warm, no rashes     Pertinent Labs:  CBC: No results for input(s): WBC, HGB, HCT, PLT in the last 72 hours. BMP:No results for input(s): NA, K, CL, CO2, BUN, CREATININE, GLU, CALCIUM in the last 72 hours. ABGs: No results found for: PHART, PO2ART, EPA3HUA  INR: No results for input(s): INR in the last 72 hours. BNP: No results for input(s): BNP in the last 72 hours.    TSH:   Lab Results   Component Value Date TSH 0.868 2020      Cardiac Injury Profile: No results for input(s): CKTOTAL, CKMB, CKMBINDEX, TROPONINI in the last 72 hours. Lipid Profile:   Lab Results   Component Value Date/Time    TRIG 128 2020 05:51 AM    HDL 34 2020 05:51 AM    LDLCALC 163 2020 05:51 AM    CHOL 223 2020 05:51 AM      Hemoglobin A1C: No components found for: HGBA1C   Xray:   No orders to display     Pertinent Cardiac Testing:     Exercise Stress Test 2020 [facility]): 1. Exercise EKG was negative. 2 The patient experienced no chest pain with exercise. 3.The myocardial perfusion imaging was normal.    Echocardiogram 2020 [facility]):   No significant valvular abnormalities. Normal left ventricle size. Borderline concentric left ventricular hypertrophy. No wall motion abnormalities. Normal diastolic function. Ejection fraction is visually estimated at 65%. EKG 3/5/20: Atrial fibrillation/atypical atrial flutter      EKG 20: Atrial flutter-Typical, RBBB, NAD, NS-ST changes. EK22: NSR RBBB rate 65 bpm, MS 208ms, , QTc 434  - see scanned caridology    Impression:    1. Paroxysmal atrial fibrillation and atrial flutter  - Typical and atypical atrial flutter/atrial fibrillation  - [id number]: 3 (age, HTN, DM)  - On Toprol XL for rate control.  - Briefly on Flecainide for rhythm control and discontinued. - On Eliquis for stroke risk reduction.  - Thirty day monitor 20 showed AF burden of < 1%.  - Presents in NSR with a RBBB. - Discussed with him regarding AAD therapy versus cryo balloon AF ablation and possible atrial flutter ablation if arrhythmias recur.  - Re-education on importance of well controlled HTN (goal BP < 130/80), adequate weight control (goal BMI of < 27), physical activity consisting of moderate cardiopulmonary exercise up to a goal of 250 min/wk, daily compliance with CPAP in treating sleep apnea, smoking cessation and limited ETOH intake.      2. Hypertension  - Controlled. - On Toprol XL and Norvasc. 3. Diabetes mellitus  - On Glucophage    4. Hyperlipidemia  - Unable to tolerate statins.   - Managed by PCP. 5. DALTON  - Report not using CPAP.  - Encourage compliance. 6. RBBB  - Chronic   - QRS:140 msec     7. History skin cancer    8. Nephrolithiasis  -Associated hematuria    9. Dyspnea upon exertion  -Not associated with chest pain, increasing in severity and frequency, occurring in male with multiple CAD risk factors (DM, HTN)  -As noted in 2020 he has below average exercise tolerance however given risk factors will will plan for ETT due to risk factors. Recommendations:    1. Exercise stress test.   2. Patient opts to continue rate control strategy at this time. 3. Continue Toprol XL 50 mg bid. 4. Continue Eliqius 5 mg bid. 5. Patient to consider AAD therapy versus cryo balloon AF ablation and possible atrial flutter ablation if arrhythmias recur. 6. Risk factor modifications as above. 7.  Consider referral to sleep medicine/pulmonary for DALTON management. 8. Follow up in 12 months with Dr. Nina Treviño or mika YOUNG. Encouraged the patient to call the office for any questions or concerns. Thank you very much to allowing me to participate in the patient's care. I have spent a total of 30 minutes with the patient and the family reviewing the above stated recommendations. And a total of >50% of that time involved face-to-face time providing counseling and or coordination of care with the other providers, preparation for the clinic visit, reviewing records/tests, counseling/education of the patient, ordering medications/tests/procedures, coordinating care, and documenting clinical information in the EHR.      Electronically signed by ANIYAH Chery CNP on 9/21/2022 at 8:35 AM  Cardiac Electrophysiology  [address]   [facility] [place]  9/21/22   8:35 AM

## 2022-09-21 ENCOUNTER — OFFICE VISIT (OUTPATIENT)
Dept: NON INVASIVE DIAGNOSTICS | Age: 72
End: 2022-09-21
Payer: MEDICARE

## 2022-09-21 VITALS
HEART RATE: 65 BPM | WEIGHT: 223.4 LBS | RESPIRATION RATE: 16 BRPM | SYSTOLIC BLOOD PRESSURE: 126 MMHG | DIASTOLIC BLOOD PRESSURE: 82 MMHG | HEIGHT: 73 IN | BODY MASS INDEX: 29.61 KG/M2

## 2022-09-21 DIAGNOSIS — I48.3 TYPICAL ATRIAL FLUTTER (HCC): Primary | ICD-10-CM

## 2022-09-21 DIAGNOSIS — R06.02 SHORTNESS OF BREATH: ICD-10-CM

## 2022-09-21 PROCEDURE — G8417 CALC BMI ABV UP PARAM F/U: HCPCS | Performed by: NURSE PRACTITIONER

## 2022-09-21 PROCEDURE — 1036F TOBACCO NON-USER: CPT | Performed by: NURSE PRACTITIONER

## 2022-09-21 PROCEDURE — 99214 OFFICE O/P EST MOD 30 MIN: CPT | Performed by: NURSE PRACTITIONER

## 2022-09-21 PROCEDURE — G8427 DOCREV CUR MEDS BY ELIG CLIN: HCPCS | Performed by: NURSE PRACTITIONER

## 2022-09-21 PROCEDURE — 3017F COLORECTAL CA SCREEN DOC REV: CPT | Performed by: NURSE PRACTITIONER

## 2022-09-21 PROCEDURE — 1123F ACP DISCUSS/DSCN MKR DOCD: CPT | Performed by: NURSE PRACTITIONER

## 2022-09-21 PROCEDURE — 93000 ELECTROCARDIOGRAM COMPLETE: CPT | Performed by: INTERNAL MEDICINE

## 2022-10-18 ENCOUNTER — TELEPHONE (OUTPATIENT)
Dept: CARDIOLOGY | Age: 72
End: 2022-10-18

## 2023-01-20 ENCOUNTER — TELEPHONE (OUTPATIENT)
Dept: CARDIOLOGY | Age: 73
End: 2023-01-20

## 2023-01-23 ENCOUNTER — HOSPITAL ENCOUNTER (OUTPATIENT)
Dept: CARDIOLOGY | Age: 73
Discharge: HOME | End: 2023-01-23

## 2023-01-23 DIAGNOSIS — R94.31 ABNORMAL EKG: Primary | ICD-10-CM

## 2023-02-02 ENCOUNTER — TELEPHONE (OUTPATIENT)
Dept: CARDIOLOGY | Age: 73
End: 2023-02-02

## 2023-02-02 NOTE — TELEPHONE ENCOUNTER
Spoke w/ pt to confirm stress test for Monday, 2/9/23 at 0830. Instructions given and medications reviewed. Pt instructed to hold DM meds the morning of test but states he takes them in the evening. All questions answered. Pt also advised that we are still awaiting prior auth from the insurance company and may need to reschedule if we do not receive it prior to test date.

## 2023-02-03 ENCOUNTER — TELEPHONE (OUTPATIENT)
Dept: CARDIOLOGY | Age: 73
End: 2023-02-03

## 2023-02-06 ENCOUNTER — TELEPHONE (OUTPATIENT)
Dept: CARDIOLOGY | Age: 73
End: 2023-02-06

## 2023-02-06 NOTE — TELEPHONE ENCOUNTER
Jordan treviño is requesting a peer to peer for stress test at [address] # [id number]. Please advise. Thank you.

## 2023-02-07 ENCOUNTER — CASE MANAGEMENT (OUTPATIENT)
Dept: NON INVASIVE DIAGNOSTICS | Age: 73
End: 2023-02-07

## 2023-02-07 NOTE — TELEPHONE ENCOUNTER
Syd Arrington attempted the peer to peer but was told that the request needed to be re submitted and clinical documentation needed to be attached.

## 2023-02-07 NOTE — PROGRESS NOTES
[employer] was initially called at phone #[phone] I was informed that the authorization for diagnostic testing does not go through [employer] but rather an [employer], thus I called [id number] provided my NPI number and was instructed to resubmit the claim utilizing fax number [phone] for clinical submission. That the patient request for diagnostic testing remains denied and peer to peer was not available at this time.

## 2023-02-13 ENCOUNTER — HOSPITAL ENCOUNTER (OUTPATIENT)
Age: 73
Discharge: HOME | End: 2023-02-15
Payer: MEDICARE

## 2023-02-13 ENCOUNTER — HOSPITAL ENCOUNTER (OUTPATIENT)
Dept: GENERAL RADIOLOGY | Age: 73
Discharge: HOME | End: 2023-02-15
Payer: MEDICARE

## 2023-02-13 DIAGNOSIS — N20.0 CALCULUS OF KIDNEY: ICD-10-CM

## 2023-02-13 PROCEDURE — 74018 RADEX ABDOMEN 1 VIEW: CPT

## 2023-02-15 ENCOUNTER — TELEPHONE (OUTPATIENT)
Dept: CARDIOLOGY | Age: 73
End: 2023-02-15

## 2023-02-16 ENCOUNTER — TELEPHONE (OUTPATIENT)
Dept: NON INVASIVE DIAGNOSTICS | Age: 73
End: 2023-02-16

## 2023-02-16 NOTE — TELEPHONE ENCOUNTER
It was noted that the patient held his Toprol prior to and attempted stress test for 2 days and developed palpitations and increased shortness of breath, he then resumed his Toprol with the resolution of the increased dyspnea and palpitations. The patient was given updates on the scheduling of his stress test and the current delay related to insurance reasons.

## 2023-02-21 ENCOUNTER — CLINICAL DOCUMENTATION (OUTPATIENT)
Dept: NON INVASIVE DIAGNOSTICS | Age: 73
End: 2023-02-21

## 2023-02-21 NOTE — TELEPHONE ENCOUNTER
Received a call from [facility], a resubmission for stress test was not an option at this time, an appeal is required. Patient is on schedule for 3/6, please advise.

## 2023-02-21 NOTE — PROGRESS NOTES
Written appeal for [facility] Levi stress test.    Name: Pepito Lowry   Address: [facility] [facility] [place] [facility]  Member number: [id number]  Reasons for appealing: Pepito Lowry is a 49-year-old male with a history of PAF, RBBB, hyperlipidemia, DALTON, obesity, spinal stenosis, hypertension, diabetes mellitus. He was seen in office on 09/21/2022 at which time he disclosed that he was experiencing  increasing fatigue, increased dyspnea upon exertion due to concerns that increased dyspnea on exertion could be an anginal equivalent a Lexiscan stress test was ordered as the patient does not believe he could participate in a treadmill stress test.  He is noted to have significant risk factors for coronary artery disease including male gender, advanced age, hypertension, diabetes mellitus, hyperlipidemia. Standard verse fast appeal: Fast appeal, patient is scheduled to have stress test within 30 days. Additional evidence: Received previous progress note.     Electronically signed by ANIYAH Hutchinson CNP on 2/21/2023 at 12:09 PM

## 2023-03-02 ENCOUNTER — TELEPHONE (OUTPATIENT)
Dept: CARDIOLOGY | Age: 73
End: 2023-03-02

## 2023-03-02 NOTE — TELEPHONE ENCOUNTER
Spoke with patient and confirmed Lexiscan stress test appointment on March 6, 2023 at 0930. Instructions for test,hold diabetic meds, and COVID-19 preprocedure information reviewed with patient, questions answered. Patient verbalized understanding.

## 2023-03-06 ENCOUNTER — HOSPITAL ENCOUNTER (OUTPATIENT)
Dept: CARDIOLOGY | Age: 73
Discharge: HOME | End: 2023-03-06
Payer: MEDICARE

## 2023-03-06 VITALS
SYSTOLIC BLOOD PRESSURE: 110 MMHG | DIASTOLIC BLOOD PRESSURE: 70 MMHG | WEIGHT: 220 LBS | RESPIRATION RATE: 18 BRPM | HEART RATE: 70 BPM | OXYGEN SATURATION: 98 % | BODY MASS INDEX: 29.16 KG/M2 | HEIGHT: 73 IN

## 2023-03-06 DIAGNOSIS — R94.31 ABNORMAL EKG: ICD-10-CM

## 2023-03-06 PROCEDURE — 6360000002 HC RX W HCPCS: Performed by: NURSE PRACTITIONER

## 2023-03-06 PROCEDURE — 3430000000 HC RX DIAGNOSTIC RADIOPHARMACEUTICAL: Performed by: INTERNAL MEDICINE

## 2023-03-06 PROCEDURE — 93017 CV STRESS TEST TRACING ONLY: CPT

## 2023-03-06 PROCEDURE — 2580000003 HC RX 258: Performed by: INTERNAL MEDICINE

## 2023-03-06 PROCEDURE — 78452 HT MUSCLE IMAGE SPECT MULT: CPT

## 2023-03-06 PROCEDURE — A9500 TC99M SESTAMIBI: HCPCS | Performed by: INTERNAL MEDICINE

## 2023-03-06 RX ORDER — TECHNETIUM TC-99M SESTAMIBI 1 MG/10ML
35 INJECTION INTRAVENOUS
Status: COMPLETED | OUTPATIENT
Start: 2023-03-06 | End: 2023-03-06

## 2023-03-06 RX ORDER — TECHNETIUM TC-99M SESTAMIBI 1 MG/10ML
10.1 INJECTION INTRAVENOUS
Status: COMPLETED | OUTPATIENT
Start: 2023-03-06 | End: 2023-03-06

## 2023-03-06 RX ORDER — SODIUM CHLORIDE 0.9 % (FLUSH) 0.9 %
10 SYRINGE (ML) INJECTION PRN
Status: DISCONTINUED | OUTPATIENT
Start: 2023-03-06 | End: 2023-03-07 | Stop reason: HOSPADM

## 2023-03-06 RX ORDER — ACETAMINOPHEN, ASPIRIN AND CAFFEINE 250; 250; 65 MG/1; MG/1; MG/1
1 TABLET, FILM COATED ORAL EVERY 6 HOURS PRN
COMMUNITY

## 2023-03-06 RX ADMIN — SODIUM CHLORIDE, PRESERVATIVE FREE 10 ML: 5 INJECTION INTRAVENOUS at 10:44

## 2023-03-06 RX ADMIN — SODIUM CHLORIDE, PRESERVATIVE FREE 10 ML: 5 INJECTION INTRAVENOUS at 09:38

## 2023-03-06 RX ADMIN — REGADENOSON 0.4 MG: 0.08 INJECTION, SOLUTION INTRAVENOUS at 10:45

## 2023-03-06 RX ADMIN — Medication 10.1 MILLICURIE: at 09:38

## 2023-03-06 RX ADMIN — SODIUM CHLORIDE, PRESERVATIVE FREE 10 ML: 5 INJECTION INTRAVENOUS at 10:45

## 2023-03-06 RX ADMIN — Medication 35 MILLICURIE: at 10:45

## 2023-03-06 NOTE — DISCHARGE INSTRUCTIONS
[address],[place]      Instructions to Patients    The following are the instructions for patients who have had a procedure in our office today. Patient name: Ira Romero    Radionuclide Activity: 40mCi of 99mTc-Sestamibi    Date Administered: 3/6/2023    Expires: 48 hours after scheduled appointment time      Patient may resume normal activity unless otherwise instructed. Patient may resume medications as normal.  If the need should arise, patient may call [phone] between the hours of 8:00am-5:00pm.  After hours there is at least one physician on-call at all times for those patients needing assistance. Patients may call [phone] and the answering service will direct the patient to one of our physicians for assistance. After the patient's test if they are going to be leaving from an airport in the near future they should take this letter with them to verify the test and radionuclide used for their test.      This letter verifies that the above named bearer received an injection of a radionuclide for medical purpose/usage only.         Electronically signed by Gila Agee on 3/6/2023 at 10:38 AM

## 2023-03-06 NOTE — PROCEDURES
[address] [facility] [address] [phone]                Pharmacologic Stress Nuclear Gated SPECT Study    Name: Bhavana Mack Account Number: [de-identified]    :  1950          Sex: male         Date of Study:  3/6/2023    Height: 6' 1\" (185.4 cm)         Weight: 220 lb (99.8 kg)     Ordering Provider: CONY Malave          PCP: aJcob Coello MD      Cardiologist: Scarlet Lopez MD             Interpreting Physician: Santa Howard MD  _________________________________________________________________________________    Indication:   Detecting the presence and location of coronary artery disease    Clinical History:   Patient has no known history of coronary artery disease. Procedure:   Pharmacologic stress testing was performed with regadenoson 0.4 mg for 15 seconds. Additionally, low-level exercise was performed along with the infusion. The heart rate was 71 at baseline and rogerio to 129 beats during the infusion. This corresponds to 88% of maximum predicted heart rate. The blood pressure at baseline was 110/70 and blood pressure at the end of infusion was 134/60. Blood pressure response was normal during the stress procedure. The patient tolerated the infusion well. ECG during the infusion did not change. IMAGING: Myocardial perfusion imaging was performed at rest 30-35 minutes following the intravenous injection of 10.1 mCi of (Tc-Sestamibi) followed by 10 ml of Normal Saline. As per infusion protocol, the patient was injected intravenously with 35.0 mCi of (Tc-Sestamibi) followed by 10 ml of Normal Saline. Gated post-stress tomographic imaging was performed 20-25 minutes after stress. FINDINGS: The overall quality of the study was excellent.      Left ventricular cavity size was noted to be normal.    Rotational analog analysis demonstrated no patient motion or abnormal extracardiac radioactivity. The gated SPECT stress imaging in the short, vertical long, and horizontal long axis demonstrated normal homogeneous tracer distribution throughout the myocardium. The resting images show no change. Gated SPECT left ventricular ejection fraction was calculated to be 58%, with normal myocardial thickening and wall motion. Impression:    ECG during the infusion did not change. The myocardial perfusion imaging was normal.    Overall left ventricular systolic function was normal without regional wall motion abnormalities. Low risk general pharmacologic nuclear stress test.    Thank you for sending your patient to this [employer] [facility].      Electronically signed by Lansing Fothergill, MD on 3/6/23 at 12:15 PM EST

## 2023-03-07 ENCOUNTER — TELEPHONE (OUTPATIENT)
Dept: NON INVASIVE DIAGNOSTICS | Age: 73
End: 2023-03-07

## 2023-03-07 NOTE — TELEPHONE ENCOUNTER
----- Message from ANIYAH Cooley CNP sent at 3/6/2023  9:45 PM EST -----  Please let Selina Smarto know that the Richelle Spencey was low risk and it is not likely that his increased SUAREZ is from a blocked artery.  Thanks.   ----- Message -----  From: Tima Grullon  Sent: 3/6/2023  12:52 PM EST  To: ANIYAH Cooley CNP

## 2023-03-27 NOTE — PROGRESS NOTES
Cardiac Electrophysiology Outpatient Progress Note    Ira Romero  1950  Date of Service: 3/28/2023  PCP: Victoria Jackson MD  Cardiologist: Frida Roblero. Patrice Betts MD  Electrophysiologist: Divya Reddy MD    Subjective:  Ira Romero is a 74 y.o. yo male who is seen in Cardiac Electrophysiology clinic for follow up of paroxysmal atrial fibrillation and atrial flutter. The paitent reports tiredness and shortness of breath that comes and goes started since December 2022  He underwent nuclear stress test in March 2023 which showed no ischemia and LV EF of 58%. The patient denies any chest pain, palpitations, dizziness, syncope, orthopnea or paroxysmal nocturnal dyspnea. He presents to day in AF with CVR at 83 bpm. Discussed with him at great length regarding rhythm control treatment with AAD therapy versus AF/AFL ablation. He opts for AF/AFL ablation    Patient Active Problem List    Diagnosis Date Noted    Atrial flutter by electrocardiography ([address] 12/23/2021    Problem 12/23/2021    Thigh pain 12/23/2021    Atrial flutter ([address] 05/20/2020    History of nuclear stress test 05/19/2020     Overview Note:     A. Normal exercise nuclear 2020        History of echocardiogram 05/19/2020     Overview Note:     A. Normal echo 2020      Hyperlipidemia     DALTON (obstructive sleep apnea)     Other persistent atrial fibrillation ([address] 03/05/2020     Overview Note:     A.  CHADS-VASC = 3      RBBB 03/05/2020    Spinal stenosis of lumbar region 08/13/2019    Hypertension 09/13/2015    Type 2 diabetes mellitus without complication ([address] 44/72/6968    Palpitations 09/13/2015       Past Medical History:   Diagnosis Date    Atrial flutter (HCC)     Cancer ([address]     skin    Diabetes mellitus ([address]     Hyperlipidemia     Hypertension     DALTON (obstructive sleep apnea)     RBBB        Past Surgical History:   Procedure Laterality Date    CARDIOVASCULAR STRESS TEST      COLONOSCOPY      HAND SURGERY  1977    KNEE SURGERY

## 2023-03-28 ENCOUNTER — OFFICE VISIT (OUTPATIENT)
Dept: NON INVASIVE DIAGNOSTICS | Age: 73
End: 2023-03-28
Payer: MEDICARE

## 2023-03-28 VITALS
HEART RATE: 83 BPM | BODY MASS INDEX: 29.55 KG/M2 | DIASTOLIC BLOOD PRESSURE: 80 MMHG | WEIGHT: 223 LBS | RESPIRATION RATE: 18 BRPM | SYSTOLIC BLOOD PRESSURE: 112 MMHG | HEIGHT: 73 IN

## 2023-03-28 DIAGNOSIS — I48.19 PERSISTENT ATRIAL FIBRILLATION (HCC): ICD-10-CM

## 2023-03-28 DIAGNOSIS — R94.31 ABNORMAL EKG: Primary | ICD-10-CM

## 2023-03-28 DIAGNOSIS — I48.3 TYPICAL ATRIAL FLUTTER (HCC): Primary | ICD-10-CM

## 2023-03-28 PROCEDURE — 1123F ACP DISCUSS/DSCN MKR DOCD: CPT | Performed by: INTERNAL MEDICINE

## 2023-03-28 PROCEDURE — G8427 DOCREV CUR MEDS BY ELIG CLIN: HCPCS | Performed by: INTERNAL MEDICINE

## 2023-03-28 PROCEDURE — 99215 OFFICE O/P EST HI 40 MIN: CPT | Performed by: INTERNAL MEDICINE

## 2023-03-28 PROCEDURE — 3074F SYST BP LT 130 MM HG: CPT | Performed by: INTERNAL MEDICINE

## 2023-03-28 PROCEDURE — G8417 CALC BMI ABV UP PARAM F/U: HCPCS | Performed by: INTERNAL MEDICINE

## 2023-03-28 PROCEDURE — 3079F DIAST BP 80-89 MM HG: CPT | Performed by: INTERNAL MEDICINE

## 2023-03-28 PROCEDURE — G8484 FLU IMMUNIZE NO ADMIN: HCPCS | Performed by: INTERNAL MEDICINE

## 2023-03-28 PROCEDURE — 93000 ELECTROCARDIOGRAM COMPLETE: CPT | Performed by: INTERNAL MEDICINE

## 2023-03-28 PROCEDURE — 1036F TOBACCO NON-USER: CPT | Performed by: INTERNAL MEDICINE

## 2023-03-28 PROCEDURE — 3017F COLORECTAL CA SCREEN DOC REV: CPT | Performed by: INTERNAL MEDICINE

## 2023-04-06 ENCOUNTER — HOSPITAL ENCOUNTER (OUTPATIENT)
Dept: CARDIOLOGY | Age: 73
Discharge: HOME | End: 2023-04-06
Payer: MEDICARE

## 2023-04-06 DIAGNOSIS — I48.3 TYPICAL ATRIAL FLUTTER (HCC): ICD-10-CM

## 2023-04-06 DIAGNOSIS — I48.19 PERSISTENT ATRIAL FIBRILLATION (HCC): ICD-10-CM

## 2023-04-06 LAB
LV EF: 58 %
LVEF MODALITY: NORMAL

## 2023-04-06 PROCEDURE — 93306 TTE W/DOPPLER COMPLETE: CPT | Performed by: PSYCHIATRY & NEUROLOGY

## 2023-04-07 ENCOUNTER — TELEPHONE (OUTPATIENT)
Dept: NON INVASIVE DIAGNOSTICS | Age: 73
End: 2023-04-07

## 2023-04-07 NOTE — TELEPHONE ENCOUNTER
----- Message from Brady Akhtar sent at 4/7/2023  9:10 AM EDT -----    ----- Message -----  From: Anna Escobar MD  Sent: 4/6/2023   4:47 PM EDT  To: Keyonna Linder MA    Echo showed normal LV function.  Thanks.  ----- Message -----  From: Kaiden Blakely Incoming Cardiology Results From [facility]  Sent: 4/6/2023   4:44 PM EDT  To: Anna Escobar MD walk 5 miles a day

## 2023-04-17 ENCOUNTER — HOSPITAL ENCOUNTER (INPATIENT)
Age: 73
LOS: 2 days | Discharge: HOME | DRG: 392 | End: 2023-04-19
Attending: EMERGENCY MEDICINE | Admitting: INTERNAL MEDICINE
Payer: MEDICARE

## 2023-04-17 ENCOUNTER — APPOINTMENT (OUTPATIENT)
Dept: GENERAL RADIOLOGY | Age: 73
DRG: 392 | End: 2023-04-17
Payer: MEDICARE

## 2023-04-17 DIAGNOSIS — R06.09 EXERTIONAL DYSPNEA: ICD-10-CM

## 2023-04-17 DIAGNOSIS — K92.2 GASTROINTESTINAL HEMORRHAGE, UNSPECIFIED GASTROINTESTINAL HEMORRHAGE TYPE: Primary | ICD-10-CM

## 2023-04-17 DIAGNOSIS — I48.91 ATRIAL FIBRILLATION, UNSPECIFIED TYPE (HCC): ICD-10-CM

## 2023-04-17 DIAGNOSIS — D64.9 ANEMIA, UNSPECIFIED TYPE: ICD-10-CM

## 2023-04-17 LAB
ABO + RH BLD: NORMAL
ANION GAP SERPL CALCULATED.3IONS-SCNC: 14 MMOL/L (ref 7–16)
ANISOCYTOSIS: ABNORMAL
BASOPHILS # BLD: 0.08 E9/L (ref 0–0.2)
BASOPHILS NFR BLD: 0.9 % (ref 0–2)
BLD GP AB SCN SERPL QL: NORMAL
BUN SERPL-MCNC: 23 MG/DL (ref 6–23)
BURR CELLS: ABNORMAL
CALCIUM SERPL-MCNC: 9.5 MG/DL (ref 8.6–10.2)
CHLORIDE SERPL-SCNC: 104 MMOL/L (ref 98–107)
CO2 SERPL-SCNC: 21 MMOL/L (ref 22–29)
CREAT SERPL-MCNC: 1 MG/DL (ref 0.7–1.2)
EKG ATRIAL RATE: 113 BPM
EKG Q-T INTERVAL: 404 MS
EKG QRS DURATION: 132 MS
EKG QTC CALCULATION (BAZETT): 496 MS
EKG R AXIS: 74 DEGREES
EKG T AXIS: -31 DEGREES
EKG VENTRICULAR RATE: 91 BPM
EOSINOPHIL # BLD: 0.81 E9/L (ref 0.05–0.5)
EOSINOPHIL NFR BLD: 8.8 % (ref 0–6)
ERYTHROCYTE [DISTWIDTH] IN BLOOD BY AUTOMATED COUNT: 16.9 FL (ref 11.5–15)
GLUCOSE SERPL-MCNC: 136 MG/DL (ref 74–99)
HCT VFR BLD AUTO: 27.7 % (ref 37–54)
HCT VFR BLD AUTO: 28.1 % (ref 37–54)
HGB BLD-MCNC: 7.5 G/DL (ref 12.5–16.5)
HGB BLD-MCNC: 7.7 G/DL (ref 12.5–16.5)
HYPOCHROMIA: ABNORMAL
INR BLD: 2.4
LYMPHOCYTES # BLD: 0.74 E9/L (ref 1.5–4)
LYMPHOCYTES NFR BLD: 7.9 % (ref 20–42)
MAGNESIUM SERPL-MCNC: 2 MG/DL (ref 1.6–2.6)
MCH RBC QN AUTO: 19.6 PG (ref 26–35)
MCHC RBC AUTO-ENTMCNC: 27.4 % (ref 32–34.5)
MCV RBC AUTO: 71.5 FL (ref 80–99.9)
MONOCYTES # BLD: 0.37 E9/L (ref 0.1–0.95)
MONOCYTES NFR BLD: 4.4 % (ref 2–12)
NEUTROPHILS # BLD: 7.18 E9/L (ref 1.8–7.3)
NEUTS SEG NFR BLD: 78 % (ref 43–80)
PLATELET # BLD AUTO: 163 E9/L (ref 130–450)
PMV BLD AUTO: 10.7 FL (ref 7–12)
POIKILOCYTES: ABNORMAL
POLYCHROMASIA: ABNORMAL
POTASSIUM SERPL-SCNC: 4.9 MMOL/L (ref 3.5–5)
PROTHROMBIN TIME: 26 SEC (ref 9.3–12.4)
RBC # BLD AUTO: 3.93 E12/L (ref 3.8–5.8)
SODIUM SERPL-SCNC: 139 MMOL/L (ref 132–146)
TARGET CELLS: ABNORMAL
TEAR DROP CELLS: ABNORMAL
TROPONIN, HIGH SENSITIVITY: 15 NG/L (ref 0–11)
TROPONIN, HIGH SENSITIVITY: 20 NG/L (ref 0–11)
WBC # BLD: 9.2 E9/L (ref 4.5–11.5)

## 2023-04-17 PROCEDURE — 99285 EMERGENCY DEPT VISIT HI MDM: CPT

## 2023-04-17 PROCEDURE — 86923 COMPATIBILITY TEST ELECTRIC: CPT

## 2023-04-17 PROCEDURE — 84484 ASSAY OF TROPONIN QUANT: CPT

## 2023-04-17 PROCEDURE — 85025 COMPLETE CBC W/AUTO DIFF WBC: CPT

## 2023-04-17 PROCEDURE — C9113 INJ PANTOPRAZOLE SODIUM, VIA: HCPCS | Performed by: EMERGENCY MEDICINE

## 2023-04-17 PROCEDURE — 86900 BLOOD TYPING SEROLOGIC ABO: CPT

## 2023-04-17 PROCEDURE — 85014 HEMATOCRIT: CPT

## 2023-04-17 PROCEDURE — 71045 X-RAY EXAM CHEST 1 VIEW: CPT

## 2023-04-17 PROCEDURE — P9016 RBC LEUKOCYTES REDUCED: HCPCS

## 2023-04-17 PROCEDURE — 2060000000 HC ICU INTERMEDIATE R&B

## 2023-04-17 PROCEDURE — 6360000002 HC RX W HCPCS: Performed by: EMERGENCY MEDICINE

## 2023-04-17 PROCEDURE — 93010 ELECTROCARDIOGRAM REPORT: CPT | Performed by: INTERNAL MEDICINE

## 2023-04-17 PROCEDURE — 86901 BLOOD TYPING SEROLOGIC RH(D): CPT

## 2023-04-17 PROCEDURE — 2580000003 HC RX 258: Performed by: FAMILY MEDICINE

## 2023-04-17 PROCEDURE — 85610 PROTHROMBIN TIME: CPT

## 2023-04-17 PROCEDURE — 80048 BASIC METABOLIC PNL TOTAL CA: CPT

## 2023-04-17 PROCEDURE — 6370000000 HC RX 637 (ALT 250 FOR IP): Performed by: FAMILY MEDICINE

## 2023-04-17 PROCEDURE — 85018 HEMOGLOBIN: CPT

## 2023-04-17 PROCEDURE — 36415 COLL VENOUS BLD VENIPUNCTURE: CPT

## 2023-04-17 PROCEDURE — 86850 RBC ANTIBODY SCREEN: CPT

## 2023-04-17 PROCEDURE — 93005 ELECTROCARDIOGRAM TRACING: CPT | Performed by: PHYSICIAN ASSISTANT

## 2023-04-17 PROCEDURE — 83735 ASSAY OF MAGNESIUM: CPT

## 2023-04-17 RX ORDER — SODIUM CHLORIDE 0.9 % (FLUSH) 0.9 %
5-40 SYRINGE (ML) INJECTION PRN
Status: DISCONTINUED | OUTPATIENT
Start: 2023-04-17 | End: 2023-04-20 | Stop reason: HOSPADM

## 2023-04-17 RX ORDER — ACETAMINOPHEN 325 MG/1
650 TABLET ORAL EVERY 6 HOURS PRN
Status: DISCONTINUED | OUTPATIENT
Start: 2023-04-17 | End: 2023-04-20 | Stop reason: HOSPADM

## 2023-04-17 RX ORDER — METOPROLOL SUCCINATE 50 MG/1
50 TABLET, EXTENDED RELEASE ORAL 2 TIMES DAILY
Status: DISCONTINUED | OUTPATIENT
Start: 2023-04-17 | End: 2023-04-20 | Stop reason: HOSPADM

## 2023-04-17 RX ORDER — METFORMIN HYDROCHLORIDE 500 MG/1
500 TABLET, EXTENDED RELEASE ORAL
Status: DISCONTINUED | OUTPATIENT
Start: 2023-04-18 | End: 2023-04-20 | Stop reason: HOSPADM

## 2023-04-17 RX ORDER — AMLODIPINE BESYLATE 10 MG/1
10 TABLET ORAL DAILY
Status: DISCONTINUED | OUTPATIENT
Start: 2023-04-18 | End: 2023-04-20 | Stop reason: HOSPADM

## 2023-04-17 RX ORDER — CETIRIZINE HYDROCHLORIDE 10 MG/1
10 TABLET ORAL DAILY
Status: DISCONTINUED | OUTPATIENT
Start: 2023-04-17 | End: 2023-04-20 | Stop reason: HOSPADM

## 2023-04-17 RX ORDER — FLUTICASONE PROPIONATE 50 MCG
1 SPRAY, SUSPENSION (ML) NASAL DAILY
Status: DISCONTINUED | OUTPATIENT
Start: 2023-04-17 | End: 2023-04-20 | Stop reason: HOSPADM

## 2023-04-17 RX ORDER — SODIUM CHLORIDE 9 MG/ML
INJECTION, SOLUTION INTRAVENOUS PRN
Status: DISCONTINUED | OUTPATIENT
Start: 2023-04-17 | End: 2023-04-20 | Stop reason: HOSPADM

## 2023-04-17 RX ORDER — SODIUM CHLORIDE 0.9 % (FLUSH) 0.9 %
5-40 SYRINGE (ML) INJECTION EVERY 12 HOURS SCHEDULED
Status: DISCONTINUED | OUTPATIENT
Start: 2023-04-17 | End: 2023-04-20 | Stop reason: HOSPADM

## 2023-04-17 RX ORDER — MELOXICAM 7.5 MG/1
15 TABLET ORAL DAILY
Status: DISCONTINUED | OUTPATIENT
Start: 2023-04-17 | End: 2023-04-20 | Stop reason: HOSPADM

## 2023-04-17 RX ORDER — ACETAMINOPHEN 650 MG/1
650 SUPPOSITORY RECTAL EVERY 6 HOURS PRN
Status: DISCONTINUED | OUTPATIENT
Start: 2023-04-17 | End: 2023-04-20 | Stop reason: HOSPADM

## 2023-04-17 RX ORDER — HYDROCODONE BITARTRATE AND ACETAMINOPHEN 5; 325 MG/1; MG/1
1 TABLET ORAL EVERY 4 HOURS PRN
Status: DISCONTINUED | OUTPATIENT
Start: 2023-04-17 | End: 2023-04-20 | Stop reason: HOSPADM

## 2023-04-17 RX ORDER — ONDANSETRON 4 MG/1
4 TABLET, ORALLY DISINTEGRATING ORAL EVERY 8 HOURS PRN
Status: DISCONTINUED | OUTPATIENT
Start: 2023-04-17 | End: 2023-04-20 | Stop reason: HOSPADM

## 2023-04-17 RX ORDER — ONDANSETRON 2 MG/ML
4 INJECTION INTRAMUSCULAR; INTRAVENOUS EVERY 6 HOURS PRN
Status: DISCONTINUED | OUTPATIENT
Start: 2023-04-17 | End: 2023-04-20 | Stop reason: HOSPADM

## 2023-04-17 RX ORDER — PANTOPRAZOLE SODIUM 40 MG/10ML
80 INJECTION, POWDER, LYOPHILIZED, FOR SOLUTION INTRAVENOUS ONCE
Status: COMPLETED | OUTPATIENT
Start: 2023-04-17 | End: 2023-04-17

## 2023-04-17 RX ADMIN — SODIUM CHLORIDE, PRESERVATIVE FREE 10 ML: 5 INJECTION INTRAVENOUS at 20:49

## 2023-04-17 RX ADMIN — FLUTICASONE PROPIONATE 1 SPRAY: 50 SPRAY, METERED NASAL at 23:00

## 2023-04-17 RX ADMIN — PANTOPRAZOLE SODIUM 80 MG: 40 INJECTION, POWDER, FOR SOLUTION INTRAVENOUS at 14:51

## 2023-04-17 RX ADMIN — METOPROLOL SUCCINATE 50 MG: 50 TABLET, EXTENDED RELEASE ORAL at 20:49

## 2023-04-17 ASSESSMENT — PAIN - FUNCTIONAL ASSESSMENT: PAIN_FUNCTIONAL_ASSESSMENT: NONE - DENIES PAIN

## 2023-04-17 ASSESSMENT — LIFESTYLE VARIABLES
HOW MANY STANDARD DRINKS CONTAINING ALCOHOL DO YOU HAVE ON A TYPICAL DAY: PATIENT DOES NOT DRINK
HOW OFTEN DO YOU HAVE A DRINK CONTAINING ALCOHOL: NEVER

## 2023-04-17 NOTE — ED PROVIDER NOTES
Also considered anemia which his CBC shows a new hemoglobin of 7.7 he is guaiac positive for blood consistent with gastrointestinal bleeding. Fortunately he is hemodynamically stable. Consult placed to general surgery, I spoke with Emilydanny Hurt, they will provide consultation. IV Protonix reordered. Consult placed to internal medicine for admission. My independent interpretation of the laboratory tests are documented in the ED course. Problems Addressed:  Anemia, unspecified type: acute illness or injury  Atrial fibrillation, unspecified type [facility]): acute illness or injury  Exertional dyspnea: acute illness or injury  Gastrointestinal hemorrhage, unspecified gastrointestinal hemorrhage type: acute illness or injury    Amount and/or Complexity of Data Reviewed  External Data Reviewed: ECG and notes. Labs: ordered. Decision-making details documented in ED Course. Radiology: ordered and independent interpretation performed. Decision-making details documented in ED Course. ECG/medicine tests: ordered and independent interpretation performed. Decision-making details documented in ED Course. Risk  Prescription drug management. Decision regarding hospitalization. CONSULTS:   Alfredo Giron CONSULT TO INTERNAL MEDICINE        PROCEDURES   Unless otherwise noted below, none       CRITICAL CARE TIME (.cct)   none        I am the Primary Clinician of Record. FINAL IMPRESSION      1. Gastrointestinal hemorrhage, unspecified gastrointestinal hemorrhage type    2. Exertional dyspnea    3. Atrial fibrillation, unspecified type ([address]    4. Anemia, unspecified type          DISPOSITION/PLAN     DISPOSITION Decision To Admit 04/17/2023 01:31:01 PM      PATIENT REFERRED TO:  No follow-up provider specified.     DISCHARGE MEDICATIONS:  New Prescriptions    No medications on file       DISCONTINUED MEDICATIONS:  Discontinued Medications    No medications on file              (Please note

## 2023-04-17 NOTE — ED NOTES
FIRST PROVIDER CONTACT ASSESSMENT NOTE       Department of Emergency Medicine                 First Provider Note            23  10:17 AM EDT    Date of Encounter: No admission date for patient encounter. Patient Name: Isis Friday  : 1950  MRN: [id number]    Chief Complaint: Palpitations (Hx a.fib. pt has ablation scheduled in may +thinners -CP) and Shortness of Breath (Increase SOB )      History of Present Illness:   Isis Friday is a 74 y.o. male who presents to the ED for palpitations, dizziness. Hx of a fib. On eliquis. Focused Physical Exam:  VS:    ED Triage Vitals   BP Temp Temp Source Heart Rate Resp SpO2 Height Weight   23 1017 23 0956 23 0956 23 0956 23 1017 23 0956 -- --   135/87 97 °F (36.1 °C) Temporal 82 16 100 %          Physical Ex: Constitutional: Alert and non-toxic. Medical History:  has a past medical history of Atrial flutter ([address] Cancer ([address] Diabetes mellitus ([address] Hyperlipidemia, Hypertension, DALTON (obstructive sleep apnea), and RBBB. Surgical History:  has a past surgical history that includes Tonsillectomy (); Neck surgery (); Hand surgery (); knee surgery (); cardiovascular stress test; and Colonoscopy. Social History:  reports that he has never smoked. He has never used smokeless tobacco. He reports that he does not drink alcohol and does not use drugs. Family History: family history includes Diabetes in his mother; Heart Attack (age of onset: 55) in his paternal uncle; Heart Attack (age of onset: 48) in his father.     Allergies: Tape Claven Catalina tape] and Pcn [penicillins]     Initial Plan of Care: Initiate Treatment-Testing, Proceed toTreatment Area When Bed Available for ED Attending/MLP to Continue Care      ---END OF FIRST PROVIDER CONTACT ASSESSMENT NOTE---  Electronically signed by AMOR Lopez   DD: 23       AMOR Lopez  23 1014

## 2023-04-17 NOTE — CONSULTS
PPI BID  EGD tomorrow 4/18. Electronically signed by Kiara Fletcher MD on 4/17/23 at 3:59 PM EDT    The patient was seen and examined in the chart was reviewed. I agree with the assessment and plan. The patient states that he has had dark stools. Also, the patient consumes Excedrin. The patient will continue the proton pump inhibitor. The patient will undergo an EGD tomorrow. I have informed him of the risks, benefits and complications of the procedure and he is willing to proceed.

## 2023-04-17 NOTE — PLAN OF CARE
Problem: Discharge Planning  Goal: Discharge to home or other facility with appropriate resources  Outcome: Progressing  Flowsheets (Taken 4/17/2023 3677)  Discharge to home or other facility with appropriate resources:   Identify barriers to discharge with patient and caregiver   Identify discharge learning needs (meds, wound care, etc)     Problem: ABCDS Injury Assessment  Goal: Absence of physical injury  Outcome: Progressing     Problem: Respiratory - Adult  Goal: Achieves optimal ventilation and oxygenation  Outcome: Progressing     Problem: Cardiovascular - Adult  Goal: Maintains optimal cardiac output and hemodynamic stability  Outcome: Progressing  Goal: Absence of cardiac dysrhythmias or at baseline  Outcome: Progressing     Problem: Hematologic - Adult  Goal: Maintains hematologic stability  Outcome: Progressing

## 2023-04-18 ENCOUNTER — TELEPHONE (OUTPATIENT)
Dept: NON INVASIVE DIAGNOSTICS | Age: 73
End: 2023-04-18

## 2023-04-18 ENCOUNTER — ANESTHESIA (OUTPATIENT)
Dept: ENDOSCOPY | Age: 73
DRG: 392 | End: 2023-04-18
Payer: MEDICARE

## 2023-04-18 ENCOUNTER — ANESTHESIA EVENT (OUTPATIENT)
Dept: ENDOSCOPY | Age: 73
DRG: 392 | End: 2023-04-18
Payer: MEDICARE

## 2023-04-18 LAB
APTT BLD: 32.9 SEC (ref 24.5–35.1)
BLOOD BANK DISPENSE STATUS: NORMAL
BLOOD BANK PRODUCT CODE: NORMAL
BPU ID: NORMAL
DESCRIPTION BLOOD BANK: NORMAL
HCT VFR BLD AUTO: 27.1 % (ref 37–54)
HCT VFR BLD AUTO: 27.2 % (ref 37–54)
HCT VFR BLD AUTO: 29.2 % (ref 37–54)
HCT VFR BLD AUTO: 30 % (ref 37–54)
HGB BLD-MCNC: 7.4 G/DL (ref 12.5–16.5)
HGB BLD-MCNC: 7.5 G/DL (ref 12.5–16.5)
HGB BLD-MCNC: 8.1 G/DL (ref 12.5–16.5)
HGB BLD-MCNC: 8.5 G/DL (ref 12.5–16.5)
INR BLD: 1.8
IRON SATN MFR SERPL: 4 % (ref 20–55)
IRON SERPL-MCNC: 14 MCG/DL (ref 59–158)
PROTHROMBIN TIME: 19.4 SEC (ref 9.3–12.4)
TIBC SERPL-MCNC: 312 MCG/DL (ref 250–450)

## 2023-04-18 PROCEDURE — 83550 IRON BINDING TEST: CPT

## 2023-04-18 PROCEDURE — 85610 PROTHROMBIN TIME: CPT

## 2023-04-18 PROCEDURE — 83540 ASSAY OF IRON: CPT

## 2023-04-18 PROCEDURE — 2060000000 HC ICU INTERMEDIATE R&B

## 2023-04-18 PROCEDURE — 7100000000 HC PACU RECOVERY - FIRST 15 MIN: Performed by: SURGERY

## 2023-04-18 PROCEDURE — 88305 TISSUE EXAM BY PATHOLOGIST: CPT

## 2023-04-18 PROCEDURE — 7100000001 HC PACU RECOVERY - ADDTL 15 MIN: Performed by: SURGERY

## 2023-04-18 PROCEDURE — 3700000000 HC ANESTHESIA ATTENDED CARE: Performed by: SURGERY

## 2023-04-18 PROCEDURE — 3700000001 HC ADD 15 MINUTES (ANESTHESIA): Performed by: SURGERY

## 2023-04-18 PROCEDURE — 36415 COLL VENOUS BLD VENIPUNCTURE: CPT

## 2023-04-18 PROCEDURE — 6370000000 HC RX 637 (ALT 250 FOR IP): Performed by: FAMILY MEDICINE

## 2023-04-18 PROCEDURE — 36430 TRANSFUSION BLD/BLD COMPNT: CPT

## 2023-04-18 PROCEDURE — 2580000003 HC RX 258: Performed by: NURSE ANESTHETIST, CERTIFIED REGISTERED

## 2023-04-18 PROCEDURE — 85018 HEMOGLOBIN: CPT

## 2023-04-18 PROCEDURE — 85730 THROMBOPLASTIN TIME PARTIAL: CPT

## 2023-04-18 PROCEDURE — 85014 HEMATOCRIT: CPT

## 2023-04-18 PROCEDURE — 2580000003 HC RX 258: Performed by: SURGERY

## 2023-04-18 PROCEDURE — 6370000000 HC RX 637 (ALT 250 FOR IP): Performed by: SURGERY

## 2023-04-18 PROCEDURE — 3609012400 HC EGD TRANSORAL BIOPSY SINGLE/MULTIPLE: Performed by: SURGERY

## 2023-04-18 PROCEDURE — 0DB98ZZ EXCISION OF DUODENUM, VIA NATURAL OR ARTIFICIAL OPENING ENDOSCOPIC: ICD-10-PCS | Performed by: SURGERY

## 2023-04-18 PROCEDURE — 6360000002 HC RX W HCPCS: Performed by: NURSE ANESTHETIST, CERTIFIED REGISTERED

## 2023-04-18 PROCEDURE — 2709999900 HC NON-CHARGEABLE SUPPLY: Performed by: SURGERY

## 2023-04-18 PROCEDURE — 2720000010 HC SURG SUPPLY STERILE: Performed by: SURGERY

## 2023-04-18 RX ORDER — PANTOPRAZOLE SODIUM 40 MG/1
40 TABLET, DELAYED RELEASE ORAL
Status: DISCONTINUED | OUTPATIENT
Start: 2023-04-19 | End: 2023-04-20 | Stop reason: HOSPADM

## 2023-04-18 RX ORDER — SODIUM CHLORIDE 9 MG/ML
INJECTION, SOLUTION INTRAVENOUS PRN
Status: DISCONTINUED | OUTPATIENT
Start: 2023-04-18 | End: 2023-04-20 | Stop reason: HOSPADM

## 2023-04-18 RX ORDER — PROPOFOL 10 MG/ML
INJECTION, EMULSION INTRAVENOUS PRN
Status: DISCONTINUED | OUTPATIENT
Start: 2023-04-18 | End: 2023-04-18 | Stop reason: SDUPTHER

## 2023-04-18 RX ORDER — SODIUM CHLORIDE 9 MG/ML
INJECTION, SOLUTION INTRAVENOUS CONTINUOUS PRN
Status: DISCONTINUED | OUTPATIENT
Start: 2023-04-18 | End: 2023-04-18 | Stop reason: SDUPTHER

## 2023-04-18 RX ADMIN — METOPROLOL SUCCINATE 50 MG: 50 TABLET, EXTENDED RELEASE ORAL at 21:35

## 2023-04-18 RX ADMIN — AMLODIPINE BESYLATE 10 MG: 10 TABLET ORAL at 16:31

## 2023-04-18 RX ADMIN — FLUTICASONE PROPIONATE 1 SPRAY: 50 SPRAY, METERED NASAL at 22:44

## 2023-04-18 RX ADMIN — PROPOFOL 230 MG: 10 INJECTION, EMULSION INTRAVENOUS at 15:16

## 2023-04-18 RX ADMIN — MELOXICAM 15 MG: 7.5 TABLET ORAL at 16:32

## 2023-04-18 RX ADMIN — SODIUM CHLORIDE: 9 INJECTION, SOLUTION INTRAVENOUS at 14:28

## 2023-04-18 RX ADMIN — METOPROLOL SUCCINATE 50 MG: 50 TABLET, EXTENDED RELEASE ORAL at 16:31

## 2023-04-18 RX ADMIN — SODIUM CHLORIDE, PRESERVATIVE FREE 10 ML: 5 INJECTION INTRAVENOUS at 21:35

## 2023-04-18 NOTE — ANESTHESIA PRE PROCEDURE
(101.2 kg)   03/06/23 220 lb (99.8 kg)     Body mass index is 29.29 kg/m². CBC:   Lab Results   Component Value Date/Time    WBC 9.2 04/17/2023 10:40 AM    RBC 3.93 04/17/2023 10:40 AM    HGB 7.5 04/18/2023 08:14 AM    HCT 27.2 04/18/2023 08:14 AM    MCV 71.5 04/17/2023 10:40 AM    RDW 16.9 04/17/2023 10:40 AM     04/17/2023 10:40 AM       CMP:   Lab Results   Component Value Date/Time     04/17/2023 10:40 AM    K 4.9 04/17/2023 10:40 AM    K 4.4 05/20/2020 05:51 AM     04/17/2023 10:40 AM    CO2 21 04/17/2023 10:40 AM    BUN 23 04/17/2023 10:40 AM    CREATININE 1.0 04/17/2023 10:40 AM    GFRAA >60 05/20/2020 05:51 AM    LABGLOM >60 04/17/2023 10:40 AM    GLUCOSE 136 04/17/2023 10:40 AM    PROT 8.0 05/18/2020 03:24 PM    CALCIUM 9.5 04/17/2023 10:40 AM    BILITOT 1.0 05/18/2020 03:24 PM    ALKPHOS 83 05/18/2020 03:24 PM    AST 31 05/18/2020 03:24 PM    ALT 30 05/18/2020 03:24 PM       POC Tests: No results for input(s): POCGLU, POCNA, POCK, POCCL, POCBUN, POCHEMO, POCHCT in the last 72 hours.     Coags:   Lab Results   Component Value Date/Time    PROTIME 19.4 04/18/2023 02:00 AM    INR 1.8 04/18/2023 02:00 AM    APTT 32.9 04/18/2023 02:00 AM       HCG (If Applicable): No results found for: PREGTESTUR, PREGSERUM, HCG, HCGQUANT     ABGs: No results found for: PHART, PO2ART, NWF4QEO, PRA4GQX, BEART, W5CTOKJV     Type & Screen (If Applicable):  No results found for: LABABO, LABRH    Drug/Infectious Status (If Applicable):  No results found for: HIV, HEPCAB    COVID-19 Screening (If Applicable):   Lab Results   Component Value Date/Time    COVID19 Not Detected 05/19/2020 03:09 PM           Anesthesia Evaluation  Patient summary reviewed no history of anesthetic complications:   Airway: Mallampati: III  TM distance: >3 FB   Neck ROM: full  Mouth opening: > = 3 FB   Dental:          Pulmonary:   (+) sleep apnea: on CPAP,                             Cardiovascular:    (+) hypertension: mild,

## 2023-04-18 NOTE — OP NOTE
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       Procedure(s):  EGD POLYP SNARE    Surgeon(s):  Viola Trevino MD    Assistant:   * No surgical staff found *    Anesthesia: Monitor Anesthesia Care    Estimated Blood Loss (mL): Minimal    Complications: None    Specimens:   ID Type Source Tests Collected by Time Destination   A : duodenal polyp  Gastric Gastric SURGICAL PATHOLOGY Viola Trevino MD 4/18/2023 1503        Implants:  * No implants in log *      Drains: * No LDAs found *    Findings: As above      Detailed Description of Procedure: The patient was brought to to the endoscopy suite and placed on the table in a left lateral decubitus position. The patient received anesthesia per the department of anesthesiology. A bite-block was placed. The endoscope was passed without difficulty through the lumen of the esophagus, stomach and duodenum. Multiple attempts at removing the polyp were unsuccessful because of the patient's respiratory status. The endoscope was removed twice. Ultimately biopsies were obtained of the neoplasm involving the first/second portion of the duodenum. The remainder of the examination was uneventful. The patient tolerated the procedure well. 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     Electronically signed by Viola Trevino MD on 4/18/2023 at 3:20 PM

## 2023-04-18 NOTE — ACP (ADVANCE CARE PLANNING)
Advance Care Planning   Healthcare Decision Maker:    Primary Decision Maker: Ben Mckenzie  [phone]    Secondary Decision Maker: Debora Cain - Briseida - [phone]    Click here to complete Healthcare Decision Makers including selection of the Healthcare Decision Maker Relationship (ie \"Primary\"). Today we documented Decision Maker(s) consistent with Legal Next of Kin hierarchy.        If the relationship to the patient does NOT follow our state's Next of Kin hierarchy, the patient MUST complete an ACP Document to allow him/her to act on the patient's behalf. :

## 2023-04-18 NOTE — TELEPHONE ENCOUNTER
Patient : Ashwin Sullivan Age: 30year old Sex: male   MRN: [id number] Encounter Date: 2/2/2019    E05/05    History     Chief Complaint   Patient presents with   â¢ Fever 9 Weeks to 76 years     HPI     2/2/2019  2:25 PM Ashwin Sullivan is a 30year old male who presents to the ED, accompanied by his wife, for evaluation of fever, HA, SOB, cough, congestion, and generalized myalgias that began yesterday night. The pt reports that he had a high fever of 104 F, which prompted him to come to the ED. He notes taking a dose Tylenol 325 mg 1 hour ago, without relief of his fever. The pt reports that his wife has been sick with similar sx as well. The patient denies sore throat, abd pain, or any other associated sx. He denies any drug allergies. There are no further complaints or modifying factors at this time. PCP: No Pcp    No Known Allergies    Current Discharge Medication List      Prior to Admission Medications    Details   Pseudoeph-Doxylamine-DM-APAP (NYQUIL PO)            History reviewed. No pertinent past medical history. Past Surgical History:   Procedure Laterality Date   â¢ FOOT SURGERY      left       History reviewed. No pertinent family history. Social History     Tobacco Use   â¢ Smoking status: Never Smoker   Substance Use Topics   â¢ Alcohol use: No   â¢ Drug use: No       Review of Systems   Constitutional: Positive for fever (104 F). Negative for activity change and chills. HENT: Positive for congestion (nasal). Negative for facial swelling, rhinorrhea and sore throat. Eyes: Negative for discharge and redness. Respiratory: Positive for cough and shortness of breath. Negative for wheezing. Gastrointestinal: Negative for abdominal pain, nausea and vomiting. Genitourinary: Negative for decreased urine volume and dysuria. Musculoskeletal: Positive for myalgias (generalized). Negative for gait problem and joint swelling. Skin: Negative. Negative for color change and rash. Spoke with patient's wife and she verbalized understanding. Patient's wife will call back after his scope to let office know what the plan is for anemia. Neurological: Positive for headaches. Negative for tremors and speech difficulty. Psychiatric/Behavioral: Negative. Negative for agitation and confusion. All other systems reviewed and are negative. Physical Exam     ED Triage Vitals [02/02/19 1421]   ED Triage Vitals Group      Temp (!) 102.4 Â°F (39.1 Â°C)      Pulse 125      Resp 22      /79      SpO2 94 %      EtCO2 mmHg       Height       Weight       Weight Scale Used        Physical Exam   Constitutional: He is oriented to person, place, and time. He appears well-developed and well-nourished. He is cooperative. Non-toxic appearance. No distress. Pt is febrile. HENT:   Head: Normocephalic and atraumatic. Right Ear: External ear normal.   Left Ear: External ear normal.   Nose: Rhinorrhea present. Mouth/Throat: Mucous membranes are normal. No oropharyngeal exudate, posterior oropharyngeal edema or posterior oropharyngeal erythema. Nasal congestion   Eyes: Conjunctivae, EOM and lids are normal.   Neck: Normal range of motion. No neck rigidity. Cardiovascular: Regular rhythm and normal heart sounds. Tachycardia present. No murmur heard. Pulmonary/Chest: Effort normal and breath sounds normal. No respiratory distress. He has no wheezes. Abdominal: Soft. Normal appearance. He exhibits no distension. There is no tenderness. Musculoskeletal: Normal range of motion. He exhibits no edema. Neurological: He is alert and oriented to person, place, and time. No cranial nerve deficit. Coordination normal.   Skin: Skin is warm and dry. No rash noted. No pallor. Psychiatric: He has a normal mood and affect. His speech is normal and behavior is normal. Judgment and thought content normal. Cognition and memory are normal.   Nursing note and vitals reviewed.       ED Course     Procedures    Lab Results     Results for orders placed or performed during the hospital encounter of 02/02/19   Influenza Rapid A/B   Result Value Ref Range SOURCE NASOPHARYNGEAL SWAB     INFLUENZA A POSITIVE (A) NEGATIVE    INFLUENZA B ANTIGEN NEGATIVE NEGATIVE       EKG Results     None. Radiology Results     Imaging Results          XR CHEST AP OR PA - PORTABLE (Final result)  Result time 02/02/19 15:07:53    Final result                 Impression:    IMPRESSION:  No radiographic evidence for definite acute cardiopulmonary  process. Narrative:    EXAM:  XR CHEST AP OR PA    CLINICAL STATEMENT:  Cough and fever . COMPARISON:  Chest radiographic study dated 12/13/2015. FINDINGS:   Portable AP view of the chest was obtained on 02/02/2019. Study reveals  normal cardiac size and mediastinal contours for the technique. Pulmonary  vascularity is within normal limits. The lungs are well expanded and are  clear. No definite focal consolidations, pneumothorax or significant  pleural effusions are demonstrated. ED Medication Orders (From admission, onward)    Start Ordered     Status Ordering Provider    02/02/19 1430 02/02/19 1429  acetaminophen (TYLENOL) tablet 650 mg  ONCE      Last MAR action:  Given DESJARLAIS, LUKE V    02/02/19 1430 02/02/19 1429  ibuprofen (MOTRIN) tablet 600 mg  ONCE      Last MAR action:  Given DESJARLAIS, LUKE V               [facility]  Vitals  Vitals:    02/02/19 1421   BP: 157/79   Pulse: 125   Resp: 22   Temp: (!) 102.4 Â°F (39.1 Â°C)   TempSrc: Oral   SpO2: 94%       ED Course    Initial impression: I performed the initial assessment and evaluation of the pt. We discussed the plan for a CXR and a rapid influenza test. I will also order Tylenol and Ibuprofen for initial fever control. The pt understands and agrees with the plan of care. All questions were addressed. 3:38 PM - I rechecked the pt. The pt is laying comfortably in her bed.  I updated the pt on his negative CXR results and his positive rapid influenza test. I discussed the Tamiflu medication and offered to prescribe him this, which he declined. He was advised to rest, increase his fluid intake, and to take the prescribed Tylenol and Ibuprofen for sx relief. The pt was instructed to return to the ED for any new or worsening sx and to follow up with his PCP. The pt understands and agrees with the plan of care. All questions were addressed. [facility] [place]  Critical Care time spent on this patient outside of billable procedures:  None    Clinical Impression  ED Diagnosis        Final diagnosis    Influenza A                The patient was provided with a recommendation to follow up with a primary care provider and obtain reassessment of his/her blood pressure within three months. Follow Up:  Shital Bass MD  [address] [facility] [id number] [phone]    Schedule an appointment as soon as possible for a visit  As needed for primary care follow up          Summary of your Discharge Medications      Take these Medications      Details   acetaminophen 500 MG tablet  Commonly known as:  TYLENOL   Take 2 tablets by mouth every 4 hours as needed for Fever (No more than 4,000 mg over any 24 hour period). ibuprofen 600 MG tablet  Commonly known as:  MOTRIN   Take 1 tablet by mouth every 6 hours as needed for Pain. Pt is discharged to home/self care in stable condition. I have reviewed the information recorded by the scribe for accuracy and agree with its contents.    ____________________________________________________________________    Odin Perdue acting as a scribe for [employer], [place].     ANNEMARIE Pathak  Dictation # [id number]  Scribe: Odin Perdue    Attending Physician: Dr. Lewis Hassan # [id number] [address] ANNEMARIE SAMUELS  02/02/19 1946

## 2023-04-18 NOTE — TELEPHONE ENCOUNTER
Message left with patient that procedure is going to be postponed per Dr. Mei Montesinos until anemia issues is resolved.      Electronically signed by Jaylene Briggs MA on 4/18/2023 at 3:32 PM

## 2023-04-18 NOTE — H&P
Problem:    GI bleed  Resolved Problems:    * No resolved hospital problems. *    70-year-old male with a history of a flutter on oral anticoagulation presents to the ED with complaints of fatigue and is admitted to telemetry unit with    GI bleed  -Telemetry monitoring  -H&H every 6 Hours transfuse as needed to keep hemoglobin greater than 7  -N.p.o. IV hydration  -Hold all blood thinners.  -Consult general surgery -EGD today  -Medication for other comorbidities continue as appropriate with dosage adjustments as necessary. DVT prophylaxis-PCD's  PT OT  Discharge planning  Case discussed with attending and agreed upon plan of care. Code status: Full  Requires inpatient level of care  ANIYAH Shaffer CNP    7:38 AM  4/18/2023     Above note edited to reflect my thoughts   Exertional dyspnea is likely secondary to decreased hemoglobin of 7.5-in 2020 hemoglobin was 13.6  EGD completed today which revealed a duodenal mass-biopsy/pathology results pending  Case discussed with patient and wife at bedside  Transfuse 1 unit PRBC to see if this helps with his exertional dyspnea  If no improvement in exertional dyspnea with PRBC transfusion, EP consultation will be obtained as patient does have a known history of A-fib for which he was to have ablation  Case discussed with Dr. Delmy Fontanez    I personally saw, examined and provided care for the patient. Radiographs, labs and medication list were reviewed by me independently. The case was discussed in detail and plans for care were established. Review of Brenna OWEN CNP, documentation was conducted and revisions were made as appropriate directly by me. I agree with the above documented exam, problem list, and plan of care.      Alyx Sherwood MD  6:16 PM  4/18/2023

## 2023-04-18 NOTE — TELEPHONE ENCOUNTER
Patient's wife called and stated patient is currently admitted for low hemoglobin (she stated it's at a 7)  He has not received a blood transfusion as of yet. Patient to have a scope today to see if he is bleeding anywhere. She stated ekg was done yesterday and he was in afib. Patient's wife wants to know if patient can have his CTA done while he is in hospital (has ablation scheduled 5/5/23). Patient's wife wanted to know what to do about patient being in AF, no consult for EP was placed as of yet.

## 2023-04-19 VITALS
HEART RATE: 99 BPM | RESPIRATION RATE: 20 BRPM | TEMPERATURE: 97.9 F | HEIGHT: 73 IN | SYSTOLIC BLOOD PRESSURE: 121 MMHG | WEIGHT: 222 LBS | OXYGEN SATURATION: 99 % | DIASTOLIC BLOOD PRESSURE: 72 MMHG | BODY MASS INDEX: 29.42 KG/M2

## 2023-04-19 LAB
ANION GAP SERPL CALCULATED.3IONS-SCNC: 10 MMOL/L (ref 7–16)
BUN SERPL-MCNC: 12 MG/DL (ref 6–23)
CALCIUM SERPL-MCNC: 9.2 MG/DL (ref 8.6–10.2)
CHLORIDE SERPL-SCNC: 104 MMOL/L (ref 98–107)
CO2 SERPL-SCNC: 23 MMOL/L (ref 22–29)
CREAT SERPL-MCNC: 0.7 MG/DL (ref 0.7–1.2)
ERYTHROCYTE [DISTWIDTH] IN BLOOD BY AUTOMATED COUNT: 17.9 FL (ref 11.5–15)
GLUCOSE SERPL-MCNC: 111 MG/DL (ref 74–99)
HCT VFR BLD AUTO: 29.9 % (ref 37–54)
HCT VFR BLD AUTO: 30.9 % (ref 37–54)
HCT VFR BLD AUTO: 31.1 % (ref 37–54)
HCT VFR BLD AUTO: 31.2 % (ref 37–54)
HGB BLD-MCNC: 8.6 G/DL (ref 12.5–16.5)
HGB BLD-MCNC: 8.6 G/DL (ref 12.5–16.5)
HGB BLD-MCNC: 8.9 G/DL (ref 12.5–16.5)
HGB BLD-MCNC: 8.9 G/DL (ref 12.5–16.5)
MCH RBC QN AUTO: 20.2 PG (ref 26–35)
MCHC RBC AUTO-ENTMCNC: 28.8 % (ref 32–34.5)
MCV RBC AUTO: 70.4 FL (ref 80–99.9)
PLATELET # BLD AUTO: 169 E9/L (ref 130–450)
PMV BLD AUTO: 10.3 FL (ref 7–12)
POTASSIUM SERPL-SCNC: 4.4 MMOL/L (ref 3.5–5)
RBC # BLD AUTO: 4.25 E12/L (ref 3.8–5.8)
SODIUM SERPL-SCNC: 137 MMOL/L (ref 132–146)
WBC # BLD: 7.6 E9/L (ref 4.5–11.5)

## 2023-04-19 PROCEDURE — 6360000002 HC RX W HCPCS: Performed by: INTERNAL MEDICINE

## 2023-04-19 PROCEDURE — 6370000000 HC RX 637 (ALT 250 FOR IP): Performed by: STUDENT IN AN ORGANIZED HEALTH CARE EDUCATION/TRAINING PROGRAM

## 2023-04-19 PROCEDURE — 36415 COLL VENOUS BLD VENIPUNCTURE: CPT

## 2023-04-19 PROCEDURE — 6370000000 HC RX 637 (ALT 250 FOR IP): Performed by: SURGERY

## 2023-04-19 PROCEDURE — 80048 BASIC METABOLIC PNL TOTAL CA: CPT

## 2023-04-19 PROCEDURE — 85018 HEMOGLOBIN: CPT

## 2023-04-19 PROCEDURE — 85014 HEMATOCRIT: CPT

## 2023-04-19 PROCEDURE — 2580000003 HC RX 258: Performed by: SURGERY

## 2023-04-19 PROCEDURE — 85027 COMPLETE CBC AUTOMATED: CPT

## 2023-04-19 RX ORDER — POLYETHYLENE GLYCOL 3350 17 G/17G
17 POWDER, FOR SOLUTION ORAL DAILY
Status: DISCONTINUED | OUTPATIENT
Start: 2023-04-19 | End: 2023-04-20 | Stop reason: HOSPADM

## 2023-04-19 RX ORDER — FUROSEMIDE 20 MG/1
20 TABLET ORAL DAILY
Qty: 60 TABLET | Refills: 3 | Status: SHIPPED | OUTPATIENT
Start: 2023-04-19

## 2023-04-19 RX ORDER — PANTOPRAZOLE SODIUM 40 MG/1
40 TABLET, DELAYED RELEASE ORAL
Qty: 30 TABLET | Refills: 3 | Status: SHIPPED | OUTPATIENT
Start: 2023-04-20

## 2023-04-19 RX ORDER — FUROSEMIDE 10 MG/ML
40 INJECTION INTRAMUSCULAR; INTRAVENOUS ONCE
Status: COMPLETED | OUTPATIENT
Start: 2023-04-19 | End: 2023-04-19

## 2023-04-19 RX ADMIN — POLYETHYLENE GLYCOL 3350 17 G: 17 POWDER, FOR SOLUTION ORAL at 09:31

## 2023-04-19 RX ADMIN — PANTOPRAZOLE SODIUM 40 MG: 40 TABLET, DELAYED RELEASE ORAL at 05:22

## 2023-04-19 RX ADMIN — FUROSEMIDE 40 MG: 10 INJECTION, SOLUTION INTRAMUSCULAR; INTRAVENOUS at 15:36

## 2023-04-19 RX ADMIN — METOPROLOL SUCCINATE 50 MG: 50 TABLET, EXTENDED RELEASE ORAL at 07:57

## 2023-04-19 RX ADMIN — AMLODIPINE BESYLATE 10 MG: 10 TABLET ORAL at 07:56

## 2023-04-19 RX ADMIN — MELOXICAM 15 MG: 7.5 TABLET ORAL at 07:55

## 2023-04-19 RX ADMIN — SODIUM CHLORIDE, PRESERVATIVE FREE 10 ML: 5 INJECTION INTRAVENOUS at 07:56

## 2023-04-19 RX ADMIN — METFORMIN HYDROCHLORIDE 500 MG: 500 TABLET, EXTENDED RELEASE ORAL at 07:56

## 2023-04-19 NOTE — PLAN OF CARE
Problem: Discharge Planning  Goal: Discharge to home or other facility with appropriate resources  Outcome: Completed  Flowsheets (Taken 4/19/2023 0800)  Discharge to home or other facility with appropriate resources: Identify barriers to discharge with patient and caregiver     Problem: Safety - Adult  Goal: Free from fall injury  Outcome: Completed     Problem: ABCDS Injury Assessment  Goal: Absence of physical injury  Outcome: Completed     Problem: Respiratory - Adult  Goal: Achieves optimal ventilation and oxygenation  Outcome: Completed  Flowsheets (Taken 4/19/2023 0800)  Achieves optimal ventilation and oxygenation: Assess for changes in respiratory status     Problem: Cardiovascular - Adult  Goal: Maintains optimal cardiac output and hemodynamic stability  Outcome: Completed  Flowsheets (Taken 4/19/2023 0800)  Maintains optimal cardiac output and hemodynamic stability: Monitor blood pressure and heart rate     Problem: Hematologic - Adult  Goal: Maintains hematologic stability  Outcome: Completed  Flowsheets (Taken 4/19/2023 0800)  Maintains hematologic stability: Assess for signs and symptoms of bleeding or hemorrhage     Problem: Chronic Conditions and Co-morbidities  Goal: Patient's chronic conditions and co-morbidity symptoms are monitored and maintained or improved  Outcome: Completed  Flowsheets (Taken 4/19/2023 0800)  Care Plan - Patient's Chronic Conditions and Co-Morbidity Symptoms are Monitored and Maintained or Improved: Monitor and assess patient's chronic conditions and comorbid symptoms for stability, deterioration, or improvement

## 2023-04-19 NOTE — CARE COORDINATION
CM note. Pt remains on PICU. EGD completed yesterday. Had 1 unit of PRBC for symptomatic anemia. Last H&H 8.9. Pt is up and independent. Met with pt. He reports he is feeling the same. He reports that the doctor told him he is holding onto some fluid and will give lasix IV to see if his symptoms improve. Plan is home with no needs at d/c.  CM/SW to follow. Bal Ordoñez RN [facility] [phone].
Cooperative, and Pleasant    Barriers to discharge: Additional Case Management Notes: The Plan for Transition of Care is related to the following treatment goals of GI bleed [K92.2]  Exertional dyspnea [R06.09]  Gastrointestinal hemorrhage, unspecified gastrointestinal hemorrhage type [K92.2]  Anemia, unspecified type [D64.9]  Atrial fibrillation, unspecified type ([facility] [address] [M49.85]    IF APPLICABLE: The Patient and/or patient representative Ewelina Bills and his family were provided with a choice of provider and agrees with the discharge plan. Freedom of choice list with basic dialogue that supports the patient's individualized plan of care/goals and shares the quality data associated with the providers was provided to:     Patient Representative Name:       The Patient and/or Patient Representative Agree with the Discharge Plan?       Theo Dale RN  Case Management Department  Ph: [phone] Fax:

## 2023-04-20 NOTE — NURSE NAVIGATOR
Pt was given and educated on discharge summary. Pt with no complaints at this time. Wife @ bedside with pt. Arm band removed. Pt discharged with all belongings with him.

## 2023-04-24 ENCOUNTER — TELEPHONE (OUTPATIENT)
Dept: NON INVASIVE DIAGNOSTICS | Age: 73
End: 2023-04-24

## 2023-04-24 NOTE — ANESTHESIA POSTPROCEDURE EVALUATION
Department of Anesthesiology  Postprocedure Note    Patient: Eleanor Campbell  MRN: [id number]  YOB: 1950  Date of evaluation: 4/24/2023      Procedure Summary     Date: 04/18/23 Room / Location: [place] [facility]    Anesthesia Start: 3086 Anesthesia Stop: 5426    Procedure: EGD BIOPSY Diagnosis:       Gastrointestinal hemorrhage, unspecified gastrointestinal hemorrhage type      (GIB)    Surgeons: Pam Andino MD Responsible Provider: Andrew Olivas MD    Anesthesia Type: MAC ASA Status: 3          Anesthesia Type: MAC    Anand Phase I: Anand Score: 10    Anand Phase II:        Anesthesia Post Evaluation    Patient location during evaluation: PACU  Patient participation: complete - patient participated  Level of consciousness: awake and alert  Airway patency: patent  Nausea & Vomiting: no vomiting and no nausea  Complications: no  Cardiovascular status: blood pressure returned to baseline  Respiratory status: acceptable  Hydration status: euvolemic  Multimodal analgesia pain management approach

## 2023-04-28 NOTE — PROGRESS NOTES
Blood cross and match done, consent signed and placed on chart and blood released. Awaiting call that it is ready.
[facility]                                Progress note    Subjective: The patient is awake and alert. No acute events overnight. He continues to feel short of breath on ambulation as he did on admission  Received 1 unit PRBCs yesterday with no improvement and exertional dyspnea    Objective:    /88   Pulse 84   Temp 98.3 °F (36.8 °C) (Temporal)   Resp 20   Ht 6' 1\" (1.854 m)   Wt 222 lb (100.7 kg)   SpO2 97%   BMI 29.29 kg/m²     In: 1010 [I.V.:300; Blood:410; Other:300]  Out: -   In: 1010   Out: -     General appearance: NAD, conversant  HEENT: AT/NC, MMM  Neck: FROM, supple  Lungs: Clear to auscultation  CV: RRR, no MRGs  Vasc: Radial pulses 2+  Abdomen: Soft, non-tender; no masses or HSM  Extremities: No peripheral edema or digital cyanosis  Skin: no rash, lesions or ulcers  Psych: Alert and oriented to person, place and time  Neuro: Alert and interactive     Recent Labs     04/17/23  1040 04/17/23  1942 04/18/23  1719 04/18/23  2304 04/19/23  0509   WBC 9.2  --   --   --   --    HGB 7.7*   < > 8.1* 8.5* 8.6*   HCT 28.1*   < > 30.0* 29.2* 31.1*     --   --   --   --     < > = values in this interval not displayed. Recent Labs     04/17/23  1040      K 4.9      CO2 21*   BUN 23   CREATININE 1.0   CALCIUM 9.5       Assessment:    Principal Problem:    GI bleed  Resolved Problems:    * No resolved hospital problems.  *      Plan:  77-year-old male with a history of a flutter on oral anticoagulation presents to the ED with complaints of fatigue and is admitted to telemetry unit with     GI bleed  -Telemetry monitoring  -H&H every 6 Hours transfuse as needed to keep hemoglobin greater than 7  -N.p.o. IV hydration  -Hold all blood thinners.  -Consult general surgery -EGD today  Exertional dyspnea is likely secondary to decreased hemoglobin of 7.5-in 2020 hemoglobin was 13.6  EGD completed today which revealed a duodenal mass-biopsy/pathology results
GENERAL SURGERY  DAILY PROGRESS NOTE  4/19/2023  Chief Complaint   Patient presents with    Palpitations     Hx a.fib. pt has ablation scheduled in may +thinners -CP    Shortness of Breath     Increase SOB        Subjective:  Patient denies any further melena/hematochezia. HgB 8.6 from 8.5 yesterday. Objective:  /88   Pulse 84   Temp 98.3 °F (36.8 °C) (Temporal)   Resp 20   Ht 6' 1\" (1.854 m)   Wt 222 lb (100.7 kg)   SpO2 97%   BMI 29.29 kg/m²     General appearance: alert, cooperative and in no acute distress. Eyes: grossly normal  Lungs: nonlabored breathing on room air. Heart: regular rate, normotensive. Abdomen: soft, non-tender, non distended  Skin: No skin abnormalities  Neurologic: Alert and oriented x 3. Grossly normal  Musculoskeletal: No clubbing cyanosis or edema    Assessment/Plan:  74 y.o. male with anemia and melena s/p EGD 4/18 with duodenal polyp. Ok for diet as tolerated. Trend H/H. No further plans from general surgery POV. Erich Retana for DC. Can f/u with Dr. Arely Fraire.     Electronically signed by Kiara Fletcher MD on 4/19/2023 at 8:07 AM
IV lasix administered. Instructed pt to use urinal so output can be measured.
Last  hgb 7.4. Still no bm's. No complaints.
No bowel movements this shift. States his bowels move every 2-3 days but has to take stool softner. No emesis of blood either.
Per Dr. Temo Duran pt is to get one unit of packed RBCs transfused in effort to help his respiratory and fatigue symptoms. We will monitor and if it does not sufficiently help she will consult EP to his case.
Pt resting in bed with no c/o any. Vitals stable and NSR. NPO for EGD and aware. Family at bedside.
John Paul Barrera on 4/27/2023 11:53 AM      Electronically signed by:  Mati Pope MD 4/28/2023 9:02 AM

## 2023-05-03 ENCOUNTER — HOSPITAL ENCOUNTER (OUTPATIENT)
Age: 73
Discharge: HOME | End: 2023-05-05
Payer: MEDICARE

## 2023-05-03 ENCOUNTER — HOSPITAL ENCOUNTER (OUTPATIENT)
Dept: GENERAL RADIOLOGY | Age: 73
Discharge: HOME | End: 2023-05-05
Payer: MEDICARE

## 2023-05-03 DIAGNOSIS — R06.00 DYSPNEA, UNSPECIFIED TYPE: ICD-10-CM

## 2023-05-03 PROCEDURE — 71046 X-RAY EXAM CHEST 2 VIEWS: CPT

## 2023-05-09 ENCOUNTER — PREP FOR PROCEDURE (OUTPATIENT)
Dept: SURGERY | Age: 73
End: 2023-05-09

## 2023-05-09 DIAGNOSIS — D64.9 ANEMIA, UNSPECIFIED TYPE: ICD-10-CM

## 2023-05-09 RX ORDER — EPINEPHRINE 1 MG/ML
0.3 INJECTION, SOLUTION, CONCENTRATE INTRAVENOUS PRN
OUTPATIENT
Start: 2023-05-09

## 2023-05-09 RX ORDER — SODIUM CHLORIDE 9 MG/ML
INJECTION, SOLUTION INTRAVENOUS CONTINUOUS
OUTPATIENT
Start: 2023-05-09

## 2023-05-09 RX ORDER — DIPHENHYDRAMINE HYDROCHLORIDE 50 MG/ML
25 INJECTION INTRAMUSCULAR; INTRAVENOUS ONCE
OUTPATIENT
Start: 2023-05-09 | End: 2023-05-09

## 2023-05-09 RX ORDER — SODIUM CHLORIDE 9 MG/ML
INJECTION, SOLUTION INTRAVENOUS CONTINUOUS
Status: CANCELLED | OUTPATIENT
Start: 2023-05-09

## 2023-05-09 RX ORDER — METHYLPREDNISOLONE SODIUM SUCCINATE 125 MG/2ML
125 INJECTION, POWDER, LYOPHILIZED, FOR SOLUTION INTRAMUSCULAR; INTRAVENOUS ONCE
Start: 2023-05-09 | End: 2023-05-09

## 2023-05-09 RX ORDER — ACETAMINOPHEN 325 MG/1
650 TABLET ORAL ONCE
OUTPATIENT
Start: 2023-05-09 | End: 2023-05-09

## 2023-05-09 RX ORDER — SODIUM CHLORIDE 9 MG/ML
5-250 INJECTION, SOLUTION INTRAVENOUS PRN
OUTPATIENT
Start: 2023-05-09

## 2023-05-09 RX ORDER — SODIUM CHLORIDE 0.9 % (FLUSH) 0.9 %
5-10 SYRINGE (ML) INJECTION PRN
OUTPATIENT
Start: 2023-05-09

## 2023-05-09 RX ORDER — HEPARIN SODIUM (PORCINE) LOCK FLUSH IV SOLN 100 UNIT/ML 100 UNIT/ML
500 SOLUTION INTRAVENOUS PRN
OUTPATIENT
Start: 2023-05-09

## 2023-05-09 RX ORDER — DIPHENHYDRAMINE HYDROCHLORIDE 50 MG/ML
50 INJECTION INTRAMUSCULAR; INTRAVENOUS
OUTPATIENT
Start: 2023-05-09

## 2023-05-09 NOTE — PROGRESS NOTES
[facility] PRE-ADMISSION TESTING INSTRUCTIONS    The Preadmission Testing patient is instructed accordingly using the following criteria (check applicable):    ARRIVAL INSTRUCTIONS:  [x] Parking the day of Surgery is located in the Main Entrance lot. Upon entering the door, make an immediate right to the surgery reception desk    [x] Bring photo ID and insurance card    [] Bring in a copy of Living will or Durable Power of  papers. [x] Please be sure to arrange for responsible adult to provide transportation to and from the hospital    [x] Please arrange for responsible adult to be with you for the 24 hour period post procedure due to having anesthesia    [x] If you awake am of surgery not feeling well or have temperature >100 please call [phone]    GENERAL INSTRUCTIONS:    [x] Nothing by mouth after midnight, including gum, candy, mints or water    [x] You may brush your teeth, but do not swallow any water    [x] Take medications as instructed with 1-2 oz of water    [x] Stop herbal supplements and vitamins 5 days prior to procedure    [] Follow preop dosing of blood thinners per physician instructions    [] Take 1/2 dose of evening insulin, but no insulin after midnight    [] No oral diabetic medications after midnight    [] If diabetic and have low blood sugar or feel symptomatic, take 1-2oz apple juice only    [] Bring inhalers day of surgery    [] Bring C-PAP/ Bi-Pap day of surgery    [] Bring urine specimen day of surgery    [x] Shower or bath with soap, lather and rinse well, AM of Surgery, no lotion, powders or creams     [x] Follow bowel prep as instructed per surgeon    [x] No tobacco products within 24 hours of surgery     [x] No alcohol or illegal drug use within 24 hours of surgery.     [x] Jewelry, body piercing's, eyeglasses, contact lenses and dentures are not permitted into surgery (bring cases)      [] Please do not wear any nail polish, make up or hair

## 2023-05-09 NOTE — H&P (VIEW-ONLY)
Name: Farida Shi               : 1950 Sex: F  Age: 74 yrs  Acct#:  [id number]            CC: Routine posthospital visit    HPI: [The patient underwent an EGD while hospitalized for anemia and a gastrointestinal hemorrhage. The EGD revealed duodenitis. Also, the patient had a neoplasm involving the duodenum. The final pathology was benign. The patient denies a family history for colorectal cancer. The patient denies melena and hematochezia. ]    Meds Prior to Visit:  Amlodipine Besylate/Atorvastatin Calcium     Metoprolol Succinate ER     Metformin HCL     Furosemide     Pantoprazole Sodium     Minocycline HCL     Fluticasone Propionate     Tylenol Extra Strength     Ferrous Sulfate        Allergies:  Penicillin G, Latex            Exam:    Neck is supple without adenopathy  Lungs are clear to auscultation  Cardiac regular rate and rhythm without murmurs, rubs or gallops  Abdomen soft, nondistended and nontender         Assessment #1: Hx K92.2 Gastrointestinal hemorrhage, unspecified   Care Plan:              Comments       : The patient will undergo a repeat EGD to remove the remainder of the polyp. The patient will undergo a full colonoscopy. I have informed him of the risks, benefits and complications of both procedures and he is willing to proceed. Moderate complexity    I performed an updated history, physical examination, assessment and plan. I reviewed the endoscopic and pathologic findings with the patient.      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

## 2023-05-12 ENCOUNTER — ANESTHESIA EVENT (OUTPATIENT)
Dept: ENDOSCOPY | Age: 73
End: 2023-05-12
Payer: MEDICARE

## 2023-05-12 ENCOUNTER — HOSPITAL ENCOUNTER (OUTPATIENT)
Age: 73
Setting detail: OUTPATIENT SURGERY
Discharge: HOME | End: 2023-05-12
Attending: SURGERY | Admitting: SURGERY
Payer: MEDICARE

## 2023-05-12 ENCOUNTER — ANESTHESIA (OUTPATIENT)
Dept: ENDOSCOPY | Age: 73
End: 2023-05-12
Payer: MEDICARE

## 2023-05-12 VITALS
WEIGHT: 215 LBS | RESPIRATION RATE: 16 BRPM | OXYGEN SATURATION: 97 % | TEMPERATURE: 97.7 F | BODY MASS INDEX: 28.49 KG/M2 | HEIGHT: 73 IN | HEART RATE: 76 BPM | DIASTOLIC BLOOD PRESSURE: 74 MMHG | SYSTOLIC BLOOD PRESSURE: 127 MMHG

## 2023-05-12 DIAGNOSIS — K92.2 GASTROINTESTINAL HEMORRHAGE, UNSPECIFIED GASTROINTESTINAL HEMORRHAGE TYPE: ICD-10-CM

## 2023-05-12 LAB — METER GLUCOSE: 117 MG/DL (ref 74–99)

## 2023-05-12 PROCEDURE — 2709999900 HC NON-CHARGEABLE SUPPLY: Performed by: SURGERY

## 2023-05-12 PROCEDURE — 3609027000 HC COLONOSCOPY: Performed by: SURGERY

## 2023-05-12 PROCEDURE — 3700000000 HC ANESTHESIA ATTENDED CARE: Performed by: SURGERY

## 2023-05-12 PROCEDURE — 2580000003 HC RX 258

## 2023-05-12 PROCEDURE — 88305 TISSUE EXAM BY PATHOLOGIST: CPT

## 2023-05-12 PROCEDURE — 3700000001 HC ADD 15 MINUTES (ANESTHESIA): Performed by: SURGERY

## 2023-05-12 PROCEDURE — 7100000011 HC PHASE II RECOVERY - ADDTL 15 MIN: Performed by: SURGERY

## 2023-05-12 PROCEDURE — 2500000003 HC RX 250 WO HCPCS

## 2023-05-12 PROCEDURE — 7100000010 HC PHASE II RECOVERY - FIRST 15 MIN: Performed by: SURGERY

## 2023-05-12 PROCEDURE — 82962 GLUCOSE BLOOD TEST: CPT

## 2023-05-12 PROCEDURE — 88342 IMHCHEM/IMCYTCHM 1ST ANTB: CPT

## 2023-05-12 PROCEDURE — 3609013500 HC EGD REMOVAL TUMOR POLYP/OTHER LESION SNARE TECH: Performed by: SURGERY

## 2023-05-12 PROCEDURE — 6360000002 HC RX W HCPCS

## 2023-05-12 RX ORDER — PROPOFOL 10 MG/ML
INJECTION, EMULSION INTRAVENOUS PRN
Status: DISCONTINUED | OUTPATIENT
Start: 2023-05-12 | End: 2023-05-12 | Stop reason: SDUPTHER

## 2023-05-12 RX ORDER — SODIUM CHLORIDE 9 MG/ML
INJECTION, SOLUTION INTRAVENOUS CONTINUOUS PRN
Status: DISCONTINUED | OUTPATIENT
Start: 2023-05-12 | End: 2023-05-12 | Stop reason: SDUPTHER

## 2023-05-12 RX ORDER — LIDOCAINE HYDROCHLORIDE 20 MG/ML
INJECTION, SOLUTION INFILTRATION; PERINEURAL PRN
Status: DISCONTINUED | OUTPATIENT
Start: 2023-05-12 | End: 2023-05-12 | Stop reason: SDUPTHER

## 2023-05-12 RX ORDER — SODIUM CHLORIDE 9 MG/ML
INJECTION, SOLUTION INTRAVENOUS CONTINUOUS
Status: DISCONTINUED | OUTPATIENT
Start: 2023-05-12 | End: 2023-05-12 | Stop reason: HOSPADM

## 2023-05-12 RX ADMIN — LIDOCAINE HYDROCHLORIDE 40 MG: 20 INJECTION, SOLUTION INFILTRATION; PERINEURAL at 09:36

## 2023-05-12 RX ADMIN — PROPOFOL 350 MG: 10 INJECTION, EMULSION INTRAVENOUS at 09:36

## 2023-05-12 RX ADMIN — SODIUM CHLORIDE: 9 INJECTION, SOLUTION INTRAVENOUS at 09:30

## 2023-05-12 ASSESSMENT — PAIN - FUNCTIONAL ASSESSMENT: PAIN_FUNCTIONAL_ASSESSMENT: 0-10

## 2023-05-12 NOTE — DISCHARGE INSTRUCTIONS
Resume a normal diet today  Resume normal activity tomorrow  Please contact the office with nausea, vomiting, fevers, chills, abdominal pain and bleeding.

## 2023-05-12 NOTE — INTERVAL H&P NOTE
Update History & Physical    The patient's History and Physical of May 12, 2023 was reviewed with the patient and I examined the patient. There was no change. The surgical site was confirmed by the patient and me. Plan: The risks, benefits, expected outcome, and alternative to the recommended procedure have been discussed with the patient. Patient understands and wants to proceed with the procedure.      Electronically signed by Clayton Riley MD on 5/12/2023 at 9:27 AM

## 2023-05-12 NOTE — ANESTHESIA POSTPROCEDURE EVALUATION
Department of Anesthesiology  Postprocedure Note    Patient: Gaurav Bradford  MRN: [id number]  YOB: 1950  Date of evaluation: 5/12/2023      Procedure Summary     Date: 05/12/23 Room / Location: [facility] / [facility]    Anesthesia Start: 0930 Anesthesia Stop: 1006    Procedures:       EGD POLYP SNARE      COLONOSCOPY DIAGNOSTIC Diagnosis:       Gastrointestinal hemorrhage, unspecified gastrointestinal hemorrhage type      (Gastrointestinal hemorrhage, unspecified gastrointestinal hemorrhage type [K92.2])    Surgeons: Annalise Pearson MD Responsible Provider: Srinath Katz MD    Anesthesia Type: MAC ASA Status: 3          Anesthesia Type: No value filed.     Anand Phase I: Anand Score: 10    Anand Phase II:        Anesthesia Post Evaluation    Patient location during evaluation: PACU  Patient participation: complete - patient participated  Level of consciousness: awake and alert  Pain score: 0  Airway patency: patent  Nausea & Vomiting: no nausea  Complications: no  Cardiovascular status: hemodynamically stable  Respiratory status: acceptable and room air  Hydration status: euvolemic

## 2023-05-12 NOTE — ANESTHESIA PRE PROCEDURE
Answered      Vital Signs (Current): There were no vitals filed for this visit. BP Readings from Last 3 Encounters:   05/12/23 117/64   05/03/23 139/83   04/19/23 121/72       NPO Status:                            2 days                                                    BMI:   Wt Readings from Last 3 Encounters:   05/09/23 215 lb (97.5 kg)   05/03/23 216 lb (98 kg)   04/17/23 222 lb (100.7 kg)     There is no height or weight on file to calculate BMI.    CBC:   Lab Results   Component Value Date/Time    WBC 7.6 04/19/2023 07:21 AM    RBC 4.25 04/19/2023 07:21 AM    HGB 8.9 04/19/2023 06:39 PM    HCT 30.9 04/19/2023 06:39 PM    MCV 70.4 04/19/2023 07:21 AM    RDW 17.9 04/19/2023 07:21 AM     04/19/2023 07:21 AM       CMP:   Lab Results   Component Value Date/Time     04/19/2023 07:21 AM    K 4.4 04/19/2023 07:21 AM    K 4.4 05/20/2020 05:51 AM     04/19/2023 07:21 AM    CO2 23 04/19/2023 07:21 AM    BUN 12 04/19/2023 07:21 AM    CREATININE 0.7 04/19/2023 07:21 AM    GFRAA >60 05/20/2020 05:51 AM    LABGLOM >60 04/19/2023 07:21 AM    GLUCOSE 111 04/19/2023 07:21 AM    PROT 8.0 05/18/2020 03:24 PM    CALCIUM 9.2 04/19/2023 07:21 AM    BILITOT 1.0 05/18/2020 03:24 PM    ALKPHOS 83 05/18/2020 03:24 PM    AST 31 05/18/2020 03:24 PM    ALT 30 05/18/2020 03:24 PM       POC Tests: No results for input(s): POCGLU, POCNA, POCK, POCCL, POCBUN, POCHEMO, POCHCT in the last 72 hours.     Coags:   Lab Results   Component Value Date/Time    PROTIME 19.4 04/18/2023 02:00 AM    INR 1.8 04/18/2023 02:00 AM    APTT 32.9 04/18/2023 02:00 AM       HCG (If Applicable): No results found for: PREGTESTUR, PREGSERUM, HCG, HCGQUANT     ABGs: No results found for: PHART, PO2ART, KHQ6EBN, ZHE9ZKT, BEART, Z7ASOZQL     Type & Screen (If Applicable):  No results found for: LABABO, LABRH    Drug/Infectious Status (If Applicable):  No results found for: HIV, HEPCAB    COVID-19 Screening

## 2023-05-12 NOTE — OP NOTE
Operative Note      Patient: Rafia Cleveland  YOB: 1950  MRN: [id number]    Date of Procedure: 5/12/2023    Pre-Op Diagnosis Codes:     * Gastrointestinal hemorrhage, unspecified gastrointestinal hemorrhage type [K92.2]    Post-Op Diagnosis:     Polyp involving the first portion of the duodenum  Mild gastritis  Normal colon       Procedure(s):  EGD POLYP SNARE  COLONOSCOPY DIAGNOSTIC    Surgeon(s):  Martin Boo MD    Assistant:   * No surgical staff found *    Anesthesia: Monitor Anesthesia Care    Estimated Blood Loss (mL): Minimal    Complications: None    Specimens:   ID Type Source Tests Collected by Time Destination   A : DUODENUM, FIRST PART, POLYPECTOMY Tissue Tissue SURGICAL PATHOLOGY Martin Boo MD 5/12/2023 0945    B : STOMACH ANTRUM Tissue Tissue SURGICAL PATHOLOGY Martin Boo MD 5/12/2023 4625        Implants:  * No implants in log *      Drains: * No LDAs found *    Findings: As above      Detailed Description of Procedure: The patient was brought to the endoscopy suite and placed on the table in the left lateral decubitus position. The patient received anesthesia per the department of anesthesiology. A bite-block was placed. The endoscope was passed through the lumen of the esophagus, stomach and duodenum. Endoscope was retrieved. The patient had a polyp involving the first portion of the duodenum. The polyp was removed with a snare technique with cautery. The remainder of the examination was uneventful. The antrum was biopsied for H. pylori. The retroflexed view did not reveal a hiatal hernia. The GE junction was marked at 45 cm from the incisors with minimal reflux changes. The remainder of the esophageal examination was uneventful. The patient was positioned for full colonoscopy. A digital rectal exam was performed. No gross findings were noted. The endoscope was inserted and passed without difficulty through the entire length of the colon.   The cecum was

## 2023-05-16 ENCOUNTER — HOSPITAL ENCOUNTER (OUTPATIENT)
Dept: INFUSION THERAPY | Age: 73
Setting detail: INFUSION SERIES
Discharge: HOME | End: 2023-05-16
Payer: MEDICARE

## 2023-05-16 VITALS
TEMPERATURE: 98 F | HEART RATE: 86 BPM | SYSTOLIC BLOOD PRESSURE: 143 MMHG | OXYGEN SATURATION: 98 % | RESPIRATION RATE: 16 BRPM | DIASTOLIC BLOOD PRESSURE: 87 MMHG

## 2023-05-16 DIAGNOSIS — D64.9 ANEMIA, UNSPECIFIED TYPE: Primary | ICD-10-CM

## 2023-05-16 PROCEDURE — 6360000002 HC RX W HCPCS: Performed by: FAMILY MEDICINE

## 2023-05-16 PROCEDURE — 96365 THER/PROPH/DIAG IV INF INIT: CPT

## 2023-05-16 PROCEDURE — 2580000003 HC RX 258: Performed by: FAMILY MEDICINE

## 2023-05-16 PROCEDURE — 96376 TX/PRO/DX INJ SAME DRUG ADON: CPT

## 2023-05-16 PROCEDURE — 96375 TX/PRO/DX INJ NEW DRUG ADDON: CPT

## 2023-05-16 RX ORDER — SODIUM CHLORIDE 9 MG/ML
INJECTION, SOLUTION INTRAVENOUS CONTINUOUS
Status: CANCELLED | OUTPATIENT
Start: 2023-05-16

## 2023-05-16 RX ORDER — ACETAMINOPHEN 325 MG/1
650 TABLET ORAL ONCE
Status: DISCONTINUED | OUTPATIENT
Start: 2023-05-16 | End: 2023-05-16

## 2023-05-16 RX ORDER — EPINEPHRINE 1 MG/ML
0.3 INJECTION, SOLUTION, CONCENTRATE INTRAVENOUS PRN
Status: CANCELLED | OUTPATIENT
Start: 2023-05-16

## 2023-05-16 RX ORDER — ACETAMINOPHEN 325 MG/1
650 TABLET ORAL ONCE
Status: CANCELLED | OUTPATIENT
Start: 2023-05-16 | End: 2023-05-16

## 2023-05-16 RX ORDER — DIPHENHYDRAMINE HYDROCHLORIDE 50 MG/ML
50 INJECTION INTRAMUSCULAR; INTRAVENOUS
Status: CANCELLED | OUTPATIENT
Start: 2023-05-16

## 2023-05-16 RX ORDER — MELOXICAM 15 MG/1
15 TABLET ORAL DAILY
COMMUNITY

## 2023-05-16 RX ORDER — DIPHENHYDRAMINE HYDROCHLORIDE 50 MG/ML
25 INJECTION INTRAMUSCULAR; INTRAVENOUS ONCE
Status: COMPLETED | OUTPATIENT
Start: 2023-05-16 | End: 2023-05-16

## 2023-05-16 RX ORDER — SODIUM CHLORIDE 9 MG/ML
5-250 INJECTION, SOLUTION INTRAVENOUS PRN
Status: CANCELLED | OUTPATIENT
Start: 2023-05-16

## 2023-05-16 RX ORDER — SODIUM CHLORIDE 0.9 % (FLUSH) 0.9 %
5-10 SYRINGE (ML) INJECTION PRN
Status: CANCELLED | OUTPATIENT
Start: 2023-05-16

## 2023-05-16 RX ORDER — METHYLPREDNISOLONE SODIUM SUCCINATE 125 MG/2ML
125 INJECTION, POWDER, LYOPHILIZED, FOR SOLUTION INTRAMUSCULAR; INTRAVENOUS ONCE
Status: CANCELLED
Start: 2023-05-16 | End: 2023-05-16

## 2023-05-16 RX ORDER — SODIUM CHLORIDE 0.9 % (FLUSH) 0.9 %
5-10 SYRINGE (ML) INJECTION PRN
Status: DISCONTINUED | OUTPATIENT
Start: 2023-05-16 | End: 2023-05-17 | Stop reason: HOSPADM

## 2023-05-16 RX ORDER — SODIUM CHLORIDE 9 MG/ML
5-250 INJECTION, SOLUTION INTRAVENOUS PRN
Status: DISCONTINUED | OUTPATIENT
Start: 2023-05-16 | End: 2023-05-17 | Stop reason: HOSPADM

## 2023-05-16 RX ORDER — DIPHENHYDRAMINE HYDROCHLORIDE 50 MG/ML
25 INJECTION INTRAMUSCULAR; INTRAVENOUS ONCE
Status: CANCELLED | OUTPATIENT
Start: 2023-05-16 | End: 2023-05-16

## 2023-05-16 RX ORDER — HEPARIN SODIUM (PORCINE) LOCK FLUSH IV SOLN 100 UNIT/ML 100 UNIT/ML
500 SOLUTION INTRAVENOUS PRN
Status: CANCELLED | OUTPATIENT
Start: 2023-05-16

## 2023-05-16 RX ADMIN — SODIUM CHLORIDE 25 MG: 9 INJECTION, SOLUTION INTRAVENOUS at 14:30

## 2023-05-16 RX ADMIN — SODIUM CHLORIDE 75 MG: 9 INJECTION, SOLUTION INTRAVENOUS at 15:24

## 2023-05-16 RX ADMIN — DIPHENHYDRAMINE HYDROCHLORIDE 25 MG: 50 INJECTION, SOLUTION INTRAMUSCULAR; INTRAVENOUS at 14:12

## 2023-05-16 RX ADMIN — SODIUM CHLORIDE, PRESERVATIVE FREE 10 ML: 5 INJECTION INTRAVENOUS at 14:10

## 2023-05-16 RX ADMIN — SODIUM CHLORIDE 30 ML: 9 INJECTION, SOLUTION INTRAVENOUS at 16:30

## 2023-05-16 NOTE — FLOWSHEET NOTE
Patient tolerated infusion well. Remained on unit for 30 minutes after treatment. Patient alert and oriented x3. No distress noted. Vital signs stable. Patient denies any new or worsening pain. Educated patient on possible side effects and treatment of medication. Patient verbalized understanding. Patient  given education and/or discharge material. Patient denies any needs. All questions answered. D/C in stable condition.

## 2023-05-16 NOTE — DISCHARGE INSTRUCTIONS
iron sucrose (injection)  Pronunciation:  EYE urn TANYA cantu  Brand:  Venofer  What is the most important information I should know about iron sucrose? Follow all directions on your medicine label and package. Tell each of your healthcare providers about all your medical conditions, allergies, and all medicines you use. What is iron sucrose? Iron sucrose is used to treat iron deficiency anemia in people with kidney disease. Iron sucrose is for use in adults and children at least 3years old. Iron sucrose is not for treating other forms of anemia not caused by iron deficiency. Iron sucrose may also be used for purposes not listed in this medication guide. What should I discuss with my healthcare provider before I receive iron sucrose? You should not be treated with this medicine if you have ever had an allergic reaction to an iron injection. Tell your doctor if you have ever had:  hemochromatosis or iron overload (the buildup of excess iron). Tell your doctor if you are pregnant or plan to become pregnant. Iron sucrose can harm an unborn baby if you have a severe reaction to this medicine during your second or third trimester. However, not treating iron deficiency anemia during pregnancy may cause complications such as premature birth or low birth weight. The benefit of treating your condition during pregnancy may outweigh any risks. If you are breastfeeding, tell your doctor if you notice diarrhea or constipation in the nursing baby. How is iron sucrose given? Iron sucrose is given as an infusion into a vein. A healthcare provider will give you this injection. This medicine is sometimes given slowly, and the infusion can take up to 2.5 hours to complete. Tell your caregivers if you feel any burning, pain, or swelling around the IV needle when iron sucrose is injected. You will be watched closely for at least 30 minutes to make sure you do not have an allergic reaction.   You will need frequent

## 2023-05-26 RX ORDER — SODIUM CHLORIDE 0.9 % (FLUSH) 0.9 %
5-40 SYRINGE (ML) INJECTION PRN
OUTPATIENT
Start: 2023-05-26

## 2023-05-26 RX ORDER — EPINEPHRINE 1 MG/ML
0.3 INJECTION, SOLUTION, CONCENTRATE INTRAVENOUS PRN
OUTPATIENT
Start: 2023-05-26

## 2023-05-26 RX ORDER — METHYLPREDNISOLONE SODIUM SUCCINATE 125 MG/2ML
125 INJECTION, POWDER, LYOPHILIZED, FOR SOLUTION INTRAMUSCULAR; INTRAVENOUS ONCE
Start: 2023-05-26 | End: 2023-05-26

## 2023-05-26 RX ORDER — DIPHENHYDRAMINE HYDROCHLORIDE 50 MG/ML
50 INJECTION INTRAMUSCULAR; INTRAVENOUS
OUTPATIENT
Start: 2023-05-26

## 2023-05-26 RX ORDER — SODIUM CHLORIDE 9 MG/ML
INJECTION, SOLUTION INTRAVENOUS CONTINUOUS
OUTPATIENT
Start: 2023-05-26

## 2023-05-26 RX ORDER — HEPARIN SODIUM (PORCINE) LOCK FLUSH IV SOLN 100 UNIT/ML 100 UNIT/ML
500 SOLUTION INTRAVENOUS PRN
OUTPATIENT
Start: 2023-05-26

## 2023-05-26 RX ORDER — SODIUM CHLORIDE 9 MG/ML
5-250 INJECTION, SOLUTION INTRAVENOUS PRN
OUTPATIENT
Start: 2023-05-26

## 2023-06-05 ENCOUNTER — HOSPITAL ENCOUNTER (OUTPATIENT)
Dept: INFUSION THERAPY | Age: 73
Setting detail: INFUSION SERIES
Discharge: HOME | End: 2023-06-05
Payer: MEDICARE

## 2023-06-05 VITALS
RESPIRATION RATE: 18 BRPM | TEMPERATURE: 97.4 F | OXYGEN SATURATION: 99 % | HEART RATE: 64 BPM | SYSTOLIC BLOOD PRESSURE: 120 MMHG | DIASTOLIC BLOOD PRESSURE: 75 MMHG

## 2023-06-05 DIAGNOSIS — D64.9 ANEMIA, UNSPECIFIED TYPE: Primary | ICD-10-CM

## 2023-06-05 PROCEDURE — 96365 THER/PROPH/DIAG IV INF INIT: CPT

## 2023-06-05 PROCEDURE — 2580000003 HC RX 258

## 2023-06-05 PROCEDURE — 2580000003 HC RX 258: Performed by: FAMILY MEDICINE

## 2023-06-05 PROCEDURE — 6360000002 HC RX W HCPCS: Performed by: FAMILY MEDICINE

## 2023-06-05 RX ORDER — SODIUM CHLORIDE 9 MG/ML
5-250 INJECTION, SOLUTION INTRAVENOUS PRN
Status: DISCONTINUED | OUTPATIENT
Start: 2023-06-05 | End: 2023-06-06 | Stop reason: HOSPADM

## 2023-06-05 RX ORDER — SODIUM CHLORIDE 0.9 % (FLUSH) 0.9 %
SYRINGE (ML) INJECTION
Status: COMPLETED
Start: 2023-06-05 | End: 2023-06-05

## 2023-06-05 RX ORDER — SODIUM CHLORIDE 9 MG/ML
5-250 INJECTION, SOLUTION INTRAVENOUS PRN
OUTPATIENT
Start: 2023-06-12

## 2023-06-05 RX ORDER — SODIUM CHLORIDE 9 MG/ML
INJECTION, SOLUTION INTRAVENOUS CONTINUOUS
OUTPATIENT
Start: 2023-06-12

## 2023-06-05 RX ORDER — SODIUM CHLORIDE 0.9 % (FLUSH) 0.9 %
5-40 SYRINGE (ML) INJECTION PRN
OUTPATIENT
Start: 2023-06-12

## 2023-06-05 RX ORDER — EPINEPHRINE 1 MG/ML
0.3 INJECTION, SOLUTION, CONCENTRATE INTRAVENOUS PRN
OUTPATIENT
Start: 2023-06-12

## 2023-06-05 RX ORDER — METHYLPREDNISOLONE SODIUM SUCCINATE 125 MG/2ML
125 INJECTION, POWDER, LYOPHILIZED, FOR SOLUTION INTRAMUSCULAR; INTRAVENOUS ONCE
Start: 2023-06-12 | End: 2023-06-12

## 2023-06-05 RX ORDER — SODIUM CHLORIDE 0.9 % (FLUSH) 0.9 %
5-40 SYRINGE (ML) INJECTION PRN
Status: DISCONTINUED | OUTPATIENT
Start: 2023-06-05 | End: 2023-06-06 | Stop reason: HOSPADM

## 2023-06-05 RX ORDER — HEPARIN SODIUM (PORCINE) LOCK FLUSH IV SOLN 100 UNIT/ML 100 UNIT/ML
500 SOLUTION INTRAVENOUS PRN
Status: CANCELLED | OUTPATIENT
Start: 2023-06-12

## 2023-06-05 RX ORDER — DIPHENHYDRAMINE HYDROCHLORIDE 50 MG/ML
50 INJECTION INTRAMUSCULAR; INTRAVENOUS
OUTPATIENT
Start: 2023-06-12

## 2023-06-05 RX ADMIN — SODIUM CHLORIDE 125 MG: 9 INJECTION, SOLUTION INTRAVENOUS at 10:33

## 2023-06-05 RX ADMIN — SODIUM CHLORIDE 40 ML/HR: 9 INJECTION, SOLUTION INTRAVENOUS at 11:35

## 2023-06-05 RX ADMIN — SODIUM CHLORIDE, PRESERVATIVE FREE 10 ML: 5 INJECTION INTRAVENOUS at 10:24

## 2023-06-05 RX ADMIN — Medication 10 ML: at 10:24

## 2023-06-05 ASSESSMENT — PAIN SCALES - GENERAL: PAINLEVEL_OUTOF10: 0

## 2023-06-05 NOTE — PROGRESS NOTES
Patient tolerated Ferrlecit infusion well. Patient alert and oriented x3. No distress noted. Vital signs stable. Patient denies any new or worsening pain. Educated patient on possible side effects and treatment of medication. Patient verbalized understanding. Offered patient education and/or discharge material. Patient declines. Patient denies any needs. All questions answered. D/C in stable condition.

## 2023-06-07 RX ORDER — DIPHENHYDRAMINE HCL 25 MG
25 TABLET ORAL EVERY 6 HOURS PRN
Status: CANCELLED
Start: 2023-06-07

## 2023-06-08 RX ORDER — DIPHENHYDRAMINE HYDROCHLORIDE 50 MG/ML
25 INJECTION INTRAMUSCULAR; INTRAVENOUS EVERY 6 HOURS PRN
Start: 2023-06-08

## 2023-06-08 RX ORDER — DIPHENHYDRAMINE HYDROCHLORIDE 50 MG/ML
25 INJECTION INTRAMUSCULAR; INTRAVENOUS
Start: 2023-06-08

## 2023-06-08 RX ORDER — HEPARIN SODIUM 100 [USP'U]/ML
500 INJECTION, SOLUTION INTRAVENOUS PRN
OUTPATIENT
Start: 2023-06-08

## 2023-06-12 ENCOUNTER — HOSPITAL ENCOUNTER (OUTPATIENT)
Dept: INFUSION THERAPY | Age: 73
Setting detail: INFUSION SERIES
Discharge: HOME | End: 2023-06-12
Payer: MEDICARE

## 2023-06-12 VITALS
OXYGEN SATURATION: 97 % | HEART RATE: 65 BPM | RESPIRATION RATE: 14 BRPM | DIASTOLIC BLOOD PRESSURE: 84 MMHG | TEMPERATURE: 97.6 F | SYSTOLIC BLOOD PRESSURE: 117 MMHG

## 2023-06-12 DIAGNOSIS — D64.9 ANEMIA, UNSPECIFIED TYPE: Primary | ICD-10-CM

## 2023-06-12 PROCEDURE — 2580000003 HC RX 258: Performed by: FAMILY MEDICINE

## 2023-06-12 PROCEDURE — 96365 THER/PROPH/DIAG IV INF INIT: CPT

## 2023-06-12 PROCEDURE — 96375 TX/PRO/DX INJ NEW DRUG ADDON: CPT

## 2023-06-12 PROCEDURE — 6360000002 HC RX W HCPCS: Performed by: FAMILY MEDICINE

## 2023-06-12 RX ORDER — DIPHENHYDRAMINE HYDROCHLORIDE 50 MG/ML
25 INJECTION INTRAMUSCULAR; INTRAVENOUS
Start: 2023-06-19

## 2023-06-12 RX ORDER — SODIUM CHLORIDE 0.9 % (FLUSH) 0.9 %
5-40 SYRINGE (ML) INJECTION PRN
Status: DISCONTINUED | OUTPATIENT
Start: 2023-06-12 | End: 2023-06-13 | Stop reason: HOSPADM

## 2023-06-12 RX ORDER — SODIUM CHLORIDE 9 MG/ML
5-250 INJECTION, SOLUTION INTRAVENOUS PRN
OUTPATIENT
Start: 2023-06-19

## 2023-06-12 RX ORDER — DIPHENHYDRAMINE HYDROCHLORIDE 50 MG/ML
25 INJECTION INTRAMUSCULAR; INTRAVENOUS EVERY 6 HOURS PRN
Status: CANCELLED
Start: 2023-06-19

## 2023-06-12 RX ORDER — SODIUM CHLORIDE 9 MG/ML
INJECTION, SOLUTION INTRAVENOUS CONTINUOUS
OUTPATIENT
Start: 2023-06-19

## 2023-06-12 RX ORDER — SODIUM CHLORIDE 9 MG/ML
5-250 INJECTION, SOLUTION INTRAVENOUS PRN
Status: CANCELLED | OUTPATIENT
Start: 2023-06-19

## 2023-06-12 RX ORDER — SODIUM CHLORIDE 9 MG/ML
5-250 INJECTION, SOLUTION INTRAVENOUS PRN
Status: DISCONTINUED | OUTPATIENT
Start: 2023-06-12 | End: 2023-06-13 | Stop reason: HOSPADM

## 2023-06-12 RX ORDER — SODIUM CHLORIDE 0.9 % (FLUSH) 0.9 %
5-40 SYRINGE (ML) INJECTION PRN
Status: CANCELLED | OUTPATIENT
Start: 2023-06-19

## 2023-06-12 RX ORDER — DIPHENHYDRAMINE HYDROCHLORIDE 50 MG/ML
50 INJECTION INTRAMUSCULAR; INTRAVENOUS
OUTPATIENT
Start: 2023-06-19

## 2023-06-12 RX ORDER — DIPHENHYDRAMINE HYDROCHLORIDE 50 MG/ML
25 INJECTION INTRAMUSCULAR; INTRAVENOUS EVERY 6 HOURS PRN
Status: DISCONTINUED | OUTPATIENT
Start: 2023-06-12 | End: 2023-06-13 | Stop reason: HOSPADM

## 2023-06-12 RX ORDER — HEPARIN SODIUM 100 [USP'U]/ML
500 INJECTION, SOLUTION INTRAVENOUS PRN
OUTPATIENT
Start: 2023-06-19

## 2023-06-12 RX ORDER — EPINEPHRINE 1 MG/ML
0.3 INJECTION, SOLUTION, CONCENTRATE INTRAVENOUS PRN
OUTPATIENT
Start: 2023-06-19

## 2023-06-12 RX ADMIN — SODIUM CHLORIDE, PRESERVATIVE FREE 10 ML: 5 INJECTION INTRAVENOUS at 10:36

## 2023-06-12 RX ADMIN — DIPHENHYDRAMINE HYDROCHLORIDE 25 MG: 50 INJECTION INTRAMUSCULAR; INTRAVENOUS at 10:35

## 2023-06-12 RX ADMIN — SODIUM CHLORIDE 125 MG: 9 INJECTION, SOLUTION INTRAVENOUS at 11:02

## 2023-06-12 RX ADMIN — SODIUM CHLORIDE, PRESERVATIVE FREE 10 ML: 5 INJECTION INTRAVENOUS at 10:35

## 2023-06-12 RX ADMIN — SODIUM CHLORIDE 110 ML/HR: 9 INJECTION, SOLUTION INTRAVENOUS at 12:04

## 2023-06-12 NOTE — FLOWSHEET NOTE
Discharged to home in stable condition , tolerated infusion well, all questions answered, VS stable , does not want additional dc instructions. no SS noted at this time.

## 2023-06-19 ENCOUNTER — HOSPITAL ENCOUNTER (OUTPATIENT)
Dept: INFUSION THERAPY | Age: 73
Setting detail: INFUSION SERIES
Discharge: HOME | End: 2023-06-19
Payer: MEDICARE

## 2023-06-19 VITALS
HEART RATE: 70 BPM | TEMPERATURE: 97.5 F | SYSTOLIC BLOOD PRESSURE: 136 MMHG | OXYGEN SATURATION: 100 % | RESPIRATION RATE: 16 BRPM | DIASTOLIC BLOOD PRESSURE: 88 MMHG

## 2023-06-19 DIAGNOSIS — D64.9 ANEMIA, UNSPECIFIED TYPE: Primary | ICD-10-CM

## 2023-06-19 PROCEDURE — 96365 THER/PROPH/DIAG IV INF INIT: CPT

## 2023-06-19 PROCEDURE — 2580000003 HC RX 258

## 2023-06-19 PROCEDURE — 96375 TX/PRO/DX INJ NEW DRUG ADDON: CPT

## 2023-06-19 PROCEDURE — 2580000003 HC RX 258: Performed by: FAMILY MEDICINE

## 2023-06-19 PROCEDURE — 6360000002 HC RX W HCPCS: Performed by: FAMILY MEDICINE

## 2023-06-19 RX ORDER — DIPHENHYDRAMINE HYDROCHLORIDE 50 MG/ML
50 INJECTION INTRAMUSCULAR; INTRAVENOUS
Status: CANCELLED | OUTPATIENT
Start: 2023-06-26

## 2023-06-19 RX ORDER — SODIUM CHLORIDE 0.9 % (FLUSH) 0.9 %
SYRINGE (ML) INJECTION
Status: COMPLETED
Start: 2023-06-19 | End: 2023-06-19

## 2023-06-19 RX ORDER — DIPHENHYDRAMINE HYDROCHLORIDE 50 MG/ML
25 INJECTION INTRAMUSCULAR; INTRAVENOUS EVERY 6 HOURS PRN
Status: CANCELLED
Start: 2023-06-26

## 2023-06-19 RX ORDER — SODIUM CHLORIDE 9 MG/ML
5-250 INJECTION, SOLUTION INTRAVENOUS PRN
Status: CANCELLED | OUTPATIENT
Start: 2023-06-26

## 2023-06-19 RX ORDER — DIPHENHYDRAMINE HYDROCHLORIDE 50 MG/ML
25 INJECTION INTRAMUSCULAR; INTRAVENOUS EVERY 6 HOURS PRN
Status: DISCONTINUED | OUTPATIENT
Start: 2023-06-19 | End: 2023-06-20 | Stop reason: HOSPADM

## 2023-06-19 RX ORDER — SODIUM CHLORIDE 9 MG/ML
INJECTION, SOLUTION INTRAVENOUS CONTINUOUS
Status: CANCELLED | OUTPATIENT
Start: 2023-06-26

## 2023-06-19 RX ORDER — SODIUM CHLORIDE 0.9 % (FLUSH) 0.9 %
5-40 SYRINGE (ML) INJECTION PRN
Status: CANCELLED | OUTPATIENT
Start: 2023-06-26

## 2023-06-19 RX ORDER — SODIUM CHLORIDE 0.9 % (FLUSH) 0.9 %
5-40 SYRINGE (ML) INJECTION PRN
Status: DISCONTINUED | OUTPATIENT
Start: 2023-06-19 | End: 2023-06-20 | Stop reason: HOSPADM

## 2023-06-19 RX ORDER — HEPARIN SODIUM 100 [USP'U]/ML
500 INJECTION, SOLUTION INTRAVENOUS PRN
Status: CANCELLED | OUTPATIENT
Start: 2023-06-26

## 2023-06-19 RX ORDER — PREDNISONE 10 MG/1
TABLET ORAL
COMMUNITY
End: 2023-06-27

## 2023-06-19 RX ORDER — EPINEPHRINE 1 MG/ML
0.3 INJECTION, SOLUTION, CONCENTRATE INTRAVENOUS PRN
Status: CANCELLED | OUTPATIENT
Start: 2023-06-26

## 2023-06-19 RX ORDER — SODIUM CHLORIDE 9 MG/ML
5-250 INJECTION, SOLUTION INTRAVENOUS PRN
Status: DISCONTINUED | OUTPATIENT
Start: 2023-06-19 | End: 2023-06-20 | Stop reason: HOSPADM

## 2023-06-19 RX ORDER — DIPHENHYDRAMINE HYDROCHLORIDE 50 MG/ML
25 INJECTION INTRAMUSCULAR; INTRAVENOUS
Status: CANCELLED
Start: 2023-06-26

## 2023-06-19 RX ADMIN — SODIUM CHLORIDE, PRESERVATIVE FREE 10 ML: 5 INJECTION INTRAVENOUS at 10:00

## 2023-06-19 RX ADMIN — SODIUM CHLORIDE, PRESERVATIVE FREE 10 ML: 5 INJECTION INTRAVENOUS at 10:08

## 2023-06-19 RX ADMIN — SODIUM CHLORIDE 30 ML: 9 INJECTION, SOLUTION INTRAVENOUS at 11:33

## 2023-06-19 RX ADMIN — SODIUM CHLORIDE 125 MG: 9 INJECTION, SOLUTION INTRAVENOUS at 10:36

## 2023-06-19 RX ADMIN — DIPHENHYDRAMINE HYDROCHLORIDE 25 MG: 50 INJECTION INTRAMUSCULAR; INTRAVENOUS at 10:06

## 2023-06-19 NOTE — FLOWSHEET NOTE
Patient tolerated INFUSION well. Remained on unit for 10 MINUTES after treatment. Patient alert and oriented x3. No distress noted. Vital signs stable. Patient denies any new or worsening pain. Educated patient on possible side effects and treatment of medication. Patient verbalized understanding. Offered patient education and/or discharge material. Patient DECLINED. Patient denies any needs. All questions answered. D/C in stable condition.

## 2023-06-26 ENCOUNTER — HOSPITAL ENCOUNTER (OUTPATIENT)
Dept: INFUSION THERAPY | Age: 73
Setting detail: INFUSION SERIES
Discharge: HOME | End: 2023-06-26
Payer: MEDICARE

## 2023-06-26 VITALS
HEART RATE: 70 BPM | TEMPERATURE: 97.1 F | OXYGEN SATURATION: 98 % | RESPIRATION RATE: 16 BRPM | SYSTOLIC BLOOD PRESSURE: 130 MMHG | DIASTOLIC BLOOD PRESSURE: 87 MMHG

## 2023-06-26 DIAGNOSIS — D64.9 ANEMIA, UNSPECIFIED TYPE: Primary | ICD-10-CM

## 2023-06-26 PROCEDURE — 6360000002 HC RX W HCPCS: Performed by: FAMILY MEDICINE

## 2023-06-26 PROCEDURE — 96365 THER/PROPH/DIAG IV INF INIT: CPT

## 2023-06-26 PROCEDURE — 2580000003 HC RX 258

## 2023-06-26 PROCEDURE — 2580000003 HC RX 258: Performed by: FAMILY MEDICINE

## 2023-06-26 PROCEDURE — 96374 THER/PROPH/DIAG INJ IV PUSH: CPT

## 2023-06-26 RX ORDER — SODIUM CHLORIDE 0.9 % (FLUSH) 0.9 %
5-40 SYRINGE (ML) INJECTION PRN
Status: CANCELLED | OUTPATIENT
Start: 2023-07-03

## 2023-06-26 RX ORDER — DIPHENHYDRAMINE HYDROCHLORIDE 50 MG/ML
50 INJECTION INTRAMUSCULAR; INTRAVENOUS
Status: CANCELLED | OUTPATIENT
Start: 2023-07-03

## 2023-06-26 RX ORDER — SODIUM CHLORIDE 9 MG/ML
5-250 INJECTION, SOLUTION INTRAVENOUS PRN
Status: DISCONTINUED | OUTPATIENT
Start: 2023-06-26 | End: 2023-06-27 | Stop reason: HOSPADM

## 2023-06-26 RX ORDER — SODIUM CHLORIDE 9 MG/ML
5-250 INJECTION, SOLUTION INTRAVENOUS PRN
Status: CANCELLED | OUTPATIENT
Start: 2023-07-03

## 2023-06-26 RX ORDER — DIPHENHYDRAMINE HYDROCHLORIDE 50 MG/ML
25 INJECTION INTRAMUSCULAR; INTRAVENOUS
Status: CANCELLED
Start: 2023-07-03

## 2023-06-26 RX ORDER — DIPHENHYDRAMINE HYDROCHLORIDE 50 MG/ML
25 INJECTION INTRAMUSCULAR; INTRAVENOUS EVERY 6 HOURS PRN
Status: CANCELLED
Start: 2023-07-03

## 2023-06-26 RX ORDER — SODIUM CHLORIDE 0.9 % (FLUSH) 0.9 %
5-40 SYRINGE (ML) INJECTION PRN
Status: DISCONTINUED | OUTPATIENT
Start: 2023-06-26 | End: 2023-06-27 | Stop reason: HOSPADM

## 2023-06-26 RX ORDER — EPINEPHRINE 1 MG/ML
0.3 INJECTION, SOLUTION, CONCENTRATE INTRAVENOUS PRN
Status: CANCELLED | OUTPATIENT
Start: 2023-07-03

## 2023-06-26 RX ORDER — SODIUM CHLORIDE 9 MG/ML
INJECTION, SOLUTION INTRAVENOUS CONTINUOUS
Status: CANCELLED | OUTPATIENT
Start: 2023-07-03

## 2023-06-26 RX ORDER — HEPARIN SODIUM 100 [USP'U]/ML
500 INJECTION, SOLUTION INTRAVENOUS PRN
Status: CANCELLED | OUTPATIENT
Start: 2023-07-03

## 2023-06-26 RX ORDER — SODIUM CHLORIDE 0.9 % (FLUSH) 0.9 %
SYRINGE (ML) INJECTION
Status: COMPLETED
Start: 2023-06-26 | End: 2023-06-26

## 2023-06-26 RX ORDER — DIPHENHYDRAMINE HYDROCHLORIDE 50 MG/ML
25 INJECTION INTRAMUSCULAR; INTRAVENOUS EVERY 6 HOURS PRN
Status: DISCONTINUED | OUTPATIENT
Start: 2023-06-26 | End: 2023-06-27 | Stop reason: HOSPADM

## 2023-06-26 RX ADMIN — SODIUM CHLORIDE 125 MG: 9 INJECTION, SOLUTION INTRAVENOUS at 10:19

## 2023-06-26 RX ADMIN — DIPHENHYDRAMINE HYDROCHLORIDE 25 MG: 50 INJECTION INTRAMUSCULAR; INTRAVENOUS at 10:04

## 2023-06-26 RX ADMIN — SODIUM CHLORIDE, PRESERVATIVE FREE 10 ML: 5 INJECTION INTRAVENOUS at 11:21

## 2023-06-26 RX ADMIN — SODIUM CHLORIDE, PRESERVATIVE FREE 10 ML: 5 INJECTION INTRAVENOUS at 10:04

## 2023-06-27 ENCOUNTER — TELEPHONE (OUTPATIENT)
Dept: NON INVASIVE DIAGNOSTICS | Age: 73
End: 2023-06-27

## 2023-06-27 ENCOUNTER — OFFICE VISIT (OUTPATIENT)
Dept: NON INVASIVE DIAGNOSTICS | Age: 73
End: 2023-06-27
Payer: MEDICARE

## 2023-06-27 VITALS
BODY MASS INDEX: 29.03 KG/M2 | DIASTOLIC BLOOD PRESSURE: 82 MMHG | HEIGHT: 73 IN | SYSTOLIC BLOOD PRESSURE: 130 MMHG | HEART RATE: 103 BPM | WEIGHT: 219 LBS

## 2023-06-27 DIAGNOSIS — I48.19 OTHER PERSISTENT ATRIAL FIBRILLATION (HCC): ICD-10-CM

## 2023-06-27 DIAGNOSIS — I48.3 TYPICAL ATRIAL FLUTTER (HCC): Primary | ICD-10-CM

## 2023-06-27 LAB
ERYTHROCYTE [DISTWIDTH] IN BLOOD BY AUTOMATED COUNT: 26.6 FL (ref 11.5–15)
HCT VFR BLD AUTO: 48 % (ref 37–54)
HGB BLD-MCNC: 14.6 G/DL (ref 12.5–16.5)
MCH RBC QN AUTO: 24.9 PG (ref 26–35)
MCHC RBC AUTO-ENTMCNC: 30.4 % (ref 32–34.5)
MCV RBC AUTO: 81.9 FL (ref 80–99.9)
PLATELET # BLD AUTO: 181 E9/L (ref 130–450)
PMV BLD AUTO: ABNORMAL FL (ref 7–12)
RBC # BLD AUTO: 5.86 E12/L (ref 3.8–5.8)
WBC # BLD: 9.3 E9/L (ref 4.5–11.5)

## 2023-06-27 PROCEDURE — 3075F SYST BP GE 130 - 139MM HG: CPT | Performed by: INTERNAL MEDICINE

## 2023-06-27 PROCEDURE — 36415 COLL VENOUS BLD VENIPUNCTURE: CPT | Performed by: INTERNAL MEDICINE

## 2023-06-27 PROCEDURE — 1123F ACP DISCUSS/DSCN MKR DOCD: CPT | Performed by: INTERNAL MEDICINE

## 2023-06-27 PROCEDURE — 93000 ELECTROCARDIOGRAM COMPLETE: CPT | Performed by: INTERNAL MEDICINE

## 2023-06-27 PROCEDURE — 3079F DIAST BP 80-89 MM HG: CPT | Performed by: INTERNAL MEDICINE

## 2023-06-27 PROCEDURE — 99215 OFFICE O/P EST HI 40 MIN: CPT | Performed by: INTERNAL MEDICINE

## 2023-06-28 ENCOUNTER — TELEPHONE (OUTPATIENT)
Dept: NON INVASIVE DIAGNOSTICS | Age: 73
End: 2023-06-28

## 2023-06-28 DIAGNOSIS — I48.19 PERSISTENT ATRIAL FIBRILLATION (HCC): Primary | ICD-10-CM

## 2023-06-30 ENCOUNTER — TELEPHONE (OUTPATIENT)
Dept: NON INVASIVE DIAGNOSTICS | Age: 73
End: 2023-06-30

## 2023-07-03 ENCOUNTER — HOSPITAL ENCOUNTER (OUTPATIENT)
Dept: INFUSION THERAPY | Age: 73
Setting detail: INFUSION SERIES
Discharge: HOME | End: 2023-07-03
Payer: MEDICARE

## 2023-07-03 VITALS
TEMPERATURE: 97.6 F | SYSTOLIC BLOOD PRESSURE: 131 MMHG | OXYGEN SATURATION: 99 % | DIASTOLIC BLOOD PRESSURE: 79 MMHG | HEART RATE: 65 BPM | RESPIRATION RATE: 16 BRPM

## 2023-07-03 DIAGNOSIS — D64.9 ANEMIA, UNSPECIFIED TYPE: Primary | ICD-10-CM

## 2023-07-03 PROCEDURE — 96375 TX/PRO/DX INJ NEW DRUG ADDON: CPT

## 2023-07-03 PROCEDURE — 6360000002 HC RX W HCPCS: Performed by: FAMILY MEDICINE

## 2023-07-03 PROCEDURE — 96365 THER/PROPH/DIAG IV INF INIT: CPT

## 2023-07-03 PROCEDURE — 2580000003 HC RX 258: Performed by: FAMILY MEDICINE

## 2023-07-03 RX ORDER — DIPHENHYDRAMINE HYDROCHLORIDE 50 MG/ML
50 INJECTION INTRAMUSCULAR; INTRAVENOUS
OUTPATIENT
Start: 2023-07-03

## 2023-07-03 RX ORDER — SODIUM CHLORIDE 9 MG/ML
INJECTION, SOLUTION INTRAVENOUS CONTINUOUS
OUTPATIENT
Start: 2023-07-03

## 2023-07-03 RX ORDER — DIPHENHYDRAMINE HYDROCHLORIDE 50 MG/ML
25 INJECTION INTRAMUSCULAR; INTRAVENOUS
Start: 2023-07-03

## 2023-07-03 RX ORDER — SODIUM CHLORIDE 9 MG/ML
5-250 INJECTION, SOLUTION INTRAVENOUS PRN
Status: DISCONTINUED | OUTPATIENT
Start: 2023-07-03 | End: 2023-07-04 | Stop reason: HOSPADM

## 2023-07-03 RX ORDER — SODIUM CHLORIDE 9 MG/ML
5-250 INJECTION, SOLUTION INTRAVENOUS PRN
Status: CANCELLED | OUTPATIENT
Start: 2023-07-03

## 2023-07-03 RX ORDER — DIPHENHYDRAMINE HYDROCHLORIDE 50 MG/ML
25 INJECTION INTRAMUSCULAR; INTRAVENOUS EVERY 6 HOURS PRN
Status: DISCONTINUED | OUTPATIENT
Start: 2023-07-03 | End: 2023-07-04 | Stop reason: HOSPADM

## 2023-07-03 RX ORDER — DIPHENHYDRAMINE HYDROCHLORIDE 50 MG/ML
25 INJECTION INTRAMUSCULAR; INTRAVENOUS EVERY 6 HOURS PRN
Status: CANCELLED
Start: 2023-07-03

## 2023-07-03 RX ORDER — SODIUM CHLORIDE 0.9 % (FLUSH) 0.9 %
5-40 SYRINGE (ML) INJECTION PRN
Status: DISCONTINUED | OUTPATIENT
Start: 2023-07-03 | End: 2023-07-04 | Stop reason: HOSPADM

## 2023-07-03 RX ORDER — SODIUM CHLORIDE 0.9 % (FLUSH) 0.9 %
5-40 SYRINGE (ML) INJECTION PRN
OUTPATIENT
Start: 2023-07-03

## 2023-07-03 RX ORDER — SODIUM CHLORIDE 9 MG/ML
5-250 INJECTION, SOLUTION INTRAVENOUS PRN
OUTPATIENT
Start: 2023-07-03

## 2023-07-03 RX ORDER — HEPARIN SODIUM 100 [USP'U]/ML
500 INJECTION, SOLUTION INTRAVENOUS PRN
OUTPATIENT
Start: 2023-07-03

## 2023-07-03 RX ORDER — EPINEPHRINE 1 MG/ML
0.3 INJECTION, SOLUTION, CONCENTRATE INTRAVENOUS PRN
OUTPATIENT
Start: 2023-07-03

## 2023-07-03 RX ADMIN — SODIUM CHLORIDE, PRESERVATIVE FREE 10 ML: 5 INJECTION INTRAVENOUS at 10:24

## 2023-07-03 RX ADMIN — SODIUM CHLORIDE 30 ML: 9 INJECTION, SOLUTION INTRAVENOUS at 11:31

## 2023-07-03 RX ADMIN — SODIUM CHLORIDE, PRESERVATIVE FREE 10 ML: 5 INJECTION INTRAVENOUS at 10:33

## 2023-07-03 RX ADMIN — SODIUM CHLORIDE 125 MG: 9 INJECTION, SOLUTION INTRAVENOUS at 10:34

## 2023-07-03 RX ADMIN — DIPHENHYDRAMINE HYDROCHLORIDE 25 MG: 50 INJECTION INTRAMUSCULAR; INTRAVENOUS at 10:28

## 2023-07-03 NOTE — TELEPHONE ENCOUNTER
#[phone] called and started prior auth, faxed clinicals.  Can take up to 14 days   Pending #[id number]

## 2023-07-03 NOTE — PROGRESS NOTES
Patient tolerated last ferrlecit infusion well. He has not had any issues since getting IV benadryl and taking prednisone prior to infusion. Remained on unit about 20 minutes after treatment. Patient alert and oriented x3. No distress noted. Vital signs stable. Patient denies any new or worsening pain. Educated patient on possible side effects and treatment of medication. Patient verbalized understanding. Offered patient education and/or discharge material. Patient declined. Patient denies any needs. All questions answered. D/C in stable condition.

## 2023-07-06 DIAGNOSIS — I48.19 PERSISTENT ATRIAL FIBRILLATION (HCC): Primary | ICD-10-CM

## 2023-07-13 ENCOUNTER — HOSPITAL ENCOUNTER (OUTPATIENT)
Dept: CT IMAGING | Age: 73
Discharge: HOME | End: 2023-07-15
Attending: INTERNAL MEDICINE
Payer: MEDICARE

## 2023-07-13 DIAGNOSIS — I48.3 TYPICAL ATRIAL FLUTTER (HCC): ICD-10-CM

## 2023-07-13 PROCEDURE — 75572 CT HRT W/3D IMAGE: CPT

## 2023-07-13 PROCEDURE — 6360000004 HC RX CONTRAST MEDICATION: Performed by: RADIOLOGY

## 2023-07-13 RX ADMIN — IOPAMIDOL 70 ML: 755 INJECTION, SOLUTION INTRAVENOUS at 16:53

## 2023-07-24 ENCOUNTER — TELEPHONE (OUTPATIENT)
Dept: NON INVASIVE DIAGNOSTICS | Age: 73
End: 2023-07-24

## 2023-07-24 NOTE — TELEPHONE ENCOUNTER
----- Message from Karen Vyas MD sent at 7/21/2023  5:45 PM EDT -----  Cardiac CTA looks fine. Will proceed as plan.  Thanks.  ----- Message -----  From: Rajat Shin Incoming Radiant Results From [employer]  Sent: 7/21/2023   2:15 PM EDT  To: Karen Vyas MD

## 2023-07-24 NOTE — TELEPHONE ENCOUNTER
Pt notified of results and verbalized understanding.     Electronically signed by Forrest Maldonado MA on 7/24/2023 at 10:08 AM\

## 2023-07-25 ENCOUNTER — TELEPHONE (OUTPATIENT)
Dept: NON INVASIVE DIAGNOSTICS | Age: 73
End: 2023-07-25

## 2023-07-25 ENCOUNTER — ANESTHESIA EVENT (OUTPATIENT)
Dept: NON INVASIVE DIAGNOSTICS | Age: 73
End: 2023-07-25

## 2023-07-25 NOTE — TELEPHONE ENCOUNTER
[facility] Pulmonary, Critical Care, and Sleep Medicine Progress Note Name: Jacob Rojo MRN: [id number] : 1946 Hospital: [address] [facility] Date: 2020 IMPRESSION:  
· Acute hypoxemic respiratory failure, intubated  · AGMA · Acute blood loss anemia · Shock · Left hip fracture · COVID-19+ · Elevated troponin · LESLEY on CKD · HTN 
· Hyperglycemia RECOMMENDATIONS:  
· Continue vent support, requiring FiO2 100% · Continue pressors - can wean but do not increase as this constitutes escalation of care · Continue bicarb · Antibiotics resumed given severity of illness, agree with continuing for now · S/p 3 unit PRBC · Trend H&H 
· Ortho consult; too unstable for any imaging or intervention · Too unstable for CT hip right now · Head CT post fall negative · Off heparin drip · Approaching need for CRRT but family declined · Insulin drip · Stopped Anthony on  · Continue Dexamethasone for 10 days total. 
· Trend inflammatory markers · S/P course of Remdesivir; completed  · Continue vitamin C, zinc, lipitor · NPO 
· SCD Prognosis poor at this point with multiorgan failure and refractory shock and hypoxia. Following discussion with palliative care, patient is DNR w/ no escalation of care Patient is critically ill and at high risk of decompensation. CCT 32 minutes Subjective:  
 
Ms. Vineet Damon is a 74yo female w/ history of HTN who presented to the ER on  w/ complaints of shortness of breath, weakness and chest pain. Sats 93% on RA. Has family members that are COVID+. Home med list includes 10mg prednisone that she has apparently been taking since April for \"pain. \" 
 
 Interval history Afebrile HR 70-80s; better Sats 95% on 1L NC 
WBC 16.1; better Hgb 8.9 Pro-BNP 2212; better ; worse CRP 5.8; better Feritin 443; worse D-dimer . 79; better Creat 3.74; better LFTs increased, , ALT 54 SPOKE WITH PATIENT. REVIEWED ARRIVAL TIME, TEST RESTRICTIONS & REQUIREMENTS. 200mL urine output documented 6/20: Eventful night. Patient got up in her room without assistance and fell, seemingly fracturing her left hip. Subsequently developed hypotension and AMS and a code blue was called. Patient did not lose a pulse but did require intubation by anesthesia. Labs drawn revealed a hemoglobin of 3.6. Started on pressors and given 3 unit PRBC. This morning remains intubated, sedated on norepi and vasopressin. 6/21: Another difficult night. BP remains low despite max doses of brittnee, norepi, and vaso. Family yesterday met with hospitalist as they do not want CRRT--changed code status to DNR but not ready for comfort measures yet. Patient remains intubated and sedated this morning. 
 
6/22: per palliative's discussion, family not able to make decision to withdraw life support but do agree w/ DNR and no escalation of care knowing that the patient's death is expected 6/23: pressors weaned some during the day yesterday due to SBP > 130 but pressors increased again overnight to near max doses? Past Medical History:  
Diagnosis Date  
 HTN (hypertension), benign No past surgical history on file. Prior to Admission medications Medication Sig Start Date End Date Taking? Authorizing Provider  
lisinopriL (PRINIVIL, ZESTRIL) 5 mg tablet Take 5 mg by mouth daily. Yes Provider, Historical  
predniSONE (DELTASONE) 5 mg tablet Take 10 mg by mouth daily. 6/2/20 7/2/20 Yes Provider, Historical  
 
No Known Allergies Social History Tobacco Use  Smoking status: Never Smoker  Smokeless tobacco: Never Used Substance Use Topics  Alcohol use: Never Frequency: Never Family History Problem Relation Age of Onset  Diabetes Neg Hx Current Facility-Administered Medications Medication Dose Route Frequency  insulin NPH (NOVOLIN N, HUMULIN N) injection 10 Units  10 Units SubCUTAneous BID  
  vasopressin (VASOSTRICT) 20 Units in 0.9% sodium chloride 100 mL infusion  0.04 Units/min IntraVENous CONTINUOUS  
 pantoprazole (PROTONIX) 40 mg in 0.9% sodium chloride 10 mL injection  40 mg IntraVENous Q12H  
 metroNIDAZOLE (FLAGYL) IVPB premix 500 mg  500 mg IntraVENous Q12H  
 insulin lispro (HUMALOG) injection   SubCUTAneous Q6H  
 NOREPINephrine (LEVOPHED) 32,000 mcg in dextrose 5% 250 mL (128 mcg/mL) infusion  0.5-200 mcg/min IntraVENous TITRATE  propofol (DIPRIVAN) 10 mg/mL infusion  0-50 mcg/kg/min IntraVENous TITRATE  fentaNYL (PF) 1,500 mcg/30 mL (50 mcg/mL) infusion  0-200 mcg/hr IntraVENous TITRATE  sodium bicarbonate (8.4%) 150 mEq in sterile water 1,000 mL infusion   IntraVENous CONTINUOUS  
 PHENYLephrine (NATE-SYNEPHRINE) 100 mg in 0.9% sodium chloride 250 mL infusion   mcg/min IntraVENous TITRATE  cefepime (MAXIPIME) 1 g in 0.9% sodium chloride (MBP/ADV) 50 mL  1 g IntraVENous Q24H  polyethylene glycol (MIRALAX) packet 17 g  17 g Oral BID  dexAMETHasone (DECADRON) tablet 10 mg  10 mg Oral DAILY  sodium chloride (NS) flush 5-40 mL  5-40 mL IntraVENous Q8H Objective:  
Vital Signs:   
Visit Vitals BP (!) 78/47 Pulse (!) 116 Temp 98.6 °F (37 °C) Resp (!) 35 Ht 4' 11\" (1.499 m) Wt 78 kg (171 lb 15.3 oz) SpO2 97% BMI 34.73 kg/m² O2 Device: Endotracheal tube, Ventilator O2 Flow Rate (L/min): 7 l/min Temp (24hrs), Av.6 °F (37 °C), Min:98.1 °F (36.7 °C), Max:99.3 °F (37.4 °C) Intake/Output:  
Last shift:      No intake/output data recorded. Last 3 shifts:  0701 -  1900 In: 9400 [I.V.:6719] Out: 462 [Urine:252] Intake/Output Summary (Last 24 hours) at 2020 Last data filed at 2020 1800 Gross per 24 hour Intake 3184.61 ml Output 342 ml Net 2842.61 ml Physical Exam:  
General:  Intubated, sedated, no distress Head:  NCAT Eyes:  EOMI, conjunctive clear Nose: Nares normal, NC in place Throat: MMM Neck:  Supple, FROM, trachea midline Lungs:   Deferred Chest wall:  Normal shape, nontender Heart:  Deferred Abdomen:   Obese, soft, NT Extremities: No c/c/e Pulses: Deferred Skin: CDI, no rash Lymph nodes: Deferred Neurologic: Sedated Exam limited to avoid unnecessary exposure and to preserve PPE during [address] pandemic. Data review:  
 
Recent Results (from the past 24 hour(s)) GLUCOSE, POC Collection Time: 06/23/20 12:14 AM  
Result Value Ref Range Glucose (POC) 335 (H) 65 - 100 mg/dL Performed by [employer] Racer Collection Time: 06/23/20  5:45 AM  
Result Value Ref Range Vancomycin, random 18.6 UG/ML  
GLUCOSE, POC Collection Time: 06/23/20  5:45 AM  
Result Value Ref Range Glucose (POC) 351 (H) 65 - 100 mg/dL Performed by [employer] GLUCOSE, POC Collection Time: 06/23/20 12:38 PM  
Result Value Ref Range Glucose (POC) 140 (H) 65 - 100 mg/dL Performed by Skyera Alma GLUCOSE, POC Collection Time: 06/23/20  6:36 PM  
Result Value Ref Range Glucose (POC) 185 (H) 65 - 100 mg/dL Performed by [employer] Imaging: 
I have personally reviewed the patients radiographs and have reviewed the reports: 
CXR (6/8/2020): There are diffuse patchy bilateral infiltrates, increased in density as compared to the prior study. CXR (6/12/2020): There is patchy bilateral upper lobe airspace disease that has increased as compared to the prior study. CXR 6/16:  Slight improvement in bilateral infiltration Austyn Youngblood MD

## 2023-07-26 ENCOUNTER — HOSPITAL ENCOUNTER (OUTPATIENT)
Dept: NON INVASIVE DIAGNOSTICS | Age: 73
Setting detail: OBSERVATION
Discharge: HOME | End: 2023-07-27
Attending: INTERNAL MEDICINE | Admitting: INTERNAL MEDICINE
Payer: MEDICARE

## 2023-07-26 ENCOUNTER — ANESTHESIA (OUTPATIENT)
Dept: NON INVASIVE DIAGNOSTICS | Age: 73
End: 2023-07-26

## 2023-07-26 PROBLEM — I48.0 PAROXYSMAL ATRIAL FIBRILLATION (HCC): Status: ACTIVE | Noted: 2023-07-26

## 2023-07-26 PROBLEM — Z98.890 S/P ABLATION OF ATRIAL FIBRILLATION: Status: ACTIVE | Noted: 2023-07-26

## 2023-07-26 PROBLEM — Z86.79 S/P ABLATION OF ATRIAL FIBRILLATION: Status: ACTIVE | Noted: 2023-07-26

## 2023-07-26 LAB
ABO + RH BLD: NORMAL
ACTIVATED CLOTTING TIME, HIGH RANGE: 141 SEC
ACTIVATED CLOTTING TIME, HIGH RANGE: 283 SEC
ACTIVATED CLOTTING TIME, HIGH RANGE: 326 SEC
ACTIVATED CLOTTING TIME, HIGH RANGE: 332 SEC
ACTIVATED CLOTTING TIME, HIGH RANGE: 348 SEC
ACTIVATED CLOTTING TIME, HIGH RANGE: 350 SEC
ACTIVATED CLOTTING TIME, HIGH RANGE: 364 SEC
ANION GAP SERPL CALCULATED.3IONS-SCNC: 11 MMOL/L (ref 7–16)
ARM BAND NUMBER: NORMAL
BLOOD BANK SAMPLE EXPIRATION: NORMAL
BLOOD GROUP ANTIBODIES SERPL: NEGATIVE
BUN SERPL-MCNC: 25 MG/DL (ref 6–23)
CALCIUM SERPL-MCNC: 10.1 MG/DL (ref 8.6–10.2)
CHLORIDE SERPL-SCNC: 104 MMOL/L (ref 98–107)
CO2 SERPL-SCNC: 27 MMOL/L (ref 22–29)
CREAT SERPL-MCNC: 0.9 MG/DL (ref 0.7–1.2)
EKG ATRIAL RATE: 69 BPM
EKG P AXIS: 101 DEGREES
EKG P-R INTERVAL: 232 MS
EKG Q-T INTERVAL: 456 MS
EKG QRS DURATION: 138 MS
EKG QTC CALCULATION (BAZETT): 488 MS
EKG R AXIS: -14 DEGREES
EKG T AXIS: -23 DEGREES
EKG VENTRICULAR RATE: 69 BPM
ERYTHROCYTE [DISTWIDTH] IN BLOOD BY AUTOMATED COUNT: 21.2 % (ref 11.5–15)
GFR SERPL CREATININE-BSD FRML MDRD: >60 ML/MIN/1.73M2
GLUCOSE SERPL-MCNC: 113 MG/DL (ref 74–99)
HCT VFR BLD AUTO: 48.5 % (ref 37–54)
HGB BLD-MCNC: 15.1 G/DL (ref 12.5–16.5)
MAGNESIUM SERPL-MCNC: 2.1 MG/DL (ref 1.6–2.6)
MCH RBC QN AUTO: 26.2 PG (ref 26–35)
MCHC RBC AUTO-ENTMCNC: 31.1 G/DL (ref 32–34.5)
MCV RBC AUTO: 84.2 FL (ref 80–99.9)
METER GLUCOSE: 164 MG/DL (ref 74–99)
PLATELET, FLUORESCENCE: 164 K/UL (ref 130–450)
PMV BLD AUTO: ABNORMAL FL (ref 7–12)
POTASSIUM SERPL-SCNC: 4.6 MMOL/L (ref 3.5–5)
RBC # BLD AUTO: 5.76 M/UL (ref 3.8–5.8)
SODIUM SERPL-SCNC: 142 MMOL/L (ref 132–146)
WBC OTHER # BLD: 6.5 K/UL (ref 4.5–11.5)

## 2023-07-26 PROCEDURE — 93656 COMPRE EP EVAL ABLTJ ATR FIB: CPT | Performed by: INTERNAL MEDICINE

## 2023-07-26 PROCEDURE — 3700000000 HC ANESTHESIA ATTENDED CARE

## 2023-07-26 PROCEDURE — 93320 DOPPLER ECHO COMPLETE: CPT | Performed by: INTERNAL MEDICINE

## 2023-07-26 PROCEDURE — C1766 INTRO/SHEATH,STRBLE,NON-PEEL: HCPCS

## 2023-07-26 PROCEDURE — 83735 ASSAY OF MAGNESIUM: CPT

## 2023-07-26 PROCEDURE — 7100000000 HC PACU RECOVERY - FIRST 15 MIN

## 2023-07-26 PROCEDURE — 93655 ICAR CATH ABLTJ DSCRT ARRHYT: CPT

## 2023-07-26 PROCEDURE — 93325 DOPPLER ECHO COLOR FLOW MAPG: CPT | Performed by: INTERNAL MEDICINE

## 2023-07-26 PROCEDURE — C1730 CATH, EP, 19 OR FEW ELECT: HCPCS

## 2023-07-26 PROCEDURE — C1732 CATH, EP, DIAG/ABL, 3D/VECT: HCPCS

## 2023-07-26 PROCEDURE — 93321 DOPPLER ECHO F-UP/LMTD STD: CPT

## 2023-07-26 PROCEDURE — C1733 CATH, EP, OTHR THAN COOL-TIP: HCPCS

## 2023-07-26 PROCEDURE — 86850 RBC ANTIBODY SCREEN: CPT

## 2023-07-26 PROCEDURE — 93005 ELECTROCARDIOGRAM TRACING: CPT | Performed by: INTERNAL MEDICINE

## 2023-07-26 PROCEDURE — 80048 BASIC METABOLIC PNL TOTAL CA: CPT

## 2023-07-26 PROCEDURE — 6360000002 HC RX W HCPCS: Performed by: NURSE ANESTHETIST, CERTIFIED REGISTERED

## 2023-07-26 PROCEDURE — 93325 DOPPLER ECHO COLOR FLOW MAPG: CPT

## 2023-07-26 PROCEDURE — 2580000003 HC RX 258: Performed by: INTERNAL MEDICINE

## 2023-07-26 PROCEDURE — G0379 DIRECT REFER HOSPITAL OBSERV: HCPCS

## 2023-07-26 PROCEDURE — 93656 COMPRE EP EVAL ABLTJ ATR FIB: CPT

## 2023-07-26 PROCEDURE — 93312 ECHO TRANSESOPHAGEAL: CPT

## 2023-07-26 PROCEDURE — 2709999900 HC NON-CHARGEABLE SUPPLY

## 2023-07-26 PROCEDURE — 86900 BLOOD TYPING SEROLOGIC ABO: CPT

## 2023-07-26 PROCEDURE — 99223 1ST HOSP IP/OBS HIGH 75: CPT | Performed by: INTERNAL MEDICINE

## 2023-07-26 PROCEDURE — 85347 COAGULATION TIME ACTIVATED: CPT

## 2023-07-26 PROCEDURE — G0378 HOSPITAL OBSERVATION PER HR: HCPCS

## 2023-07-26 PROCEDURE — 93312 ECHO TRANSESOPHAGEAL: CPT | Performed by: INTERNAL MEDICINE

## 2023-07-26 PROCEDURE — 2500000003 HC RX 250 WO HCPCS: Performed by: NURSE ANESTHETIST, CERTIFIED REGISTERED

## 2023-07-26 PROCEDURE — 6370000000 HC RX 637 (ALT 250 FOR IP): Performed by: INTERNAL MEDICINE

## 2023-07-26 PROCEDURE — C1894 INTRO/SHEATH, NON-LASER: HCPCS

## 2023-07-26 PROCEDURE — 2500000003 HC RX 250 WO HCPCS

## 2023-07-26 PROCEDURE — 6360000002 HC RX W HCPCS

## 2023-07-26 PROCEDURE — 2580000003 HC RX 258: Performed by: NURSE ANESTHETIST, CERTIFIED REGISTERED

## 2023-07-26 PROCEDURE — 86901 BLOOD TYPING SEROLOGIC RH(D): CPT

## 2023-07-26 PROCEDURE — C1769 GUIDE WIRE: HCPCS

## 2023-07-26 PROCEDURE — 7100000001 HC PACU RECOVERY - ADDTL 15 MIN

## 2023-07-26 PROCEDURE — 82947 ASSAY GLUCOSE BLOOD QUANT: CPT

## 2023-07-26 PROCEDURE — 3700000001 HC ADD 15 MINUTES (ANESTHESIA)

## 2023-07-26 PROCEDURE — 85027 COMPLETE CBC AUTOMATED: CPT

## 2023-07-26 PROCEDURE — C1759 CATH, INTRA ECHOCARDIOGRAPHY: HCPCS

## 2023-07-26 PROCEDURE — 93655 ICAR CATH ABLTJ DSCRT ARRHYT: CPT | Performed by: INTERNAL MEDICINE

## 2023-07-26 RX ORDER — SODIUM CHLORIDE 9 MG/ML
INJECTION, SOLUTION INTRAVENOUS CONTINUOUS PRN
Status: DISCONTINUED | OUTPATIENT
Start: 2023-07-26 | End: 2023-07-26 | Stop reason: SDUPTHER

## 2023-07-26 RX ORDER — MELOXICAM 7.5 MG/1
15 TABLET ORAL DAILY
Status: DISCONTINUED | OUTPATIENT
Start: 2023-07-26 | End: 2023-07-26

## 2023-07-26 RX ORDER — DEXAMETHASONE SODIUM PHOSPHATE 10 MG/ML
INJECTION INTRAMUSCULAR; INTRAVENOUS PRN
Status: DISCONTINUED | OUTPATIENT
Start: 2023-07-26 | End: 2023-07-26 | Stop reason: SDUPTHER

## 2023-07-26 RX ORDER — SODIUM CHLORIDE 0.9 % (FLUSH) 0.9 %
5-40 SYRINGE (ML) INJECTION PRN
Status: DISCONTINUED | OUTPATIENT
Start: 2023-07-26 | End: 2023-07-27 | Stop reason: HOSPADM

## 2023-07-26 RX ORDER — ACETAMINOPHEN,DIPHENHYDRAMINE HCL 500; 25 MG/1; MG/1
2 TABLET, FILM COATED ORAL NIGHTLY PRN
Status: DISCONTINUED | OUTPATIENT
Start: 2023-07-26 | End: 2023-07-26 | Stop reason: RX

## 2023-07-26 RX ORDER — EPHEDRINE SULFATE/0.9% NACL/PF 50 MG/5 ML
SYRINGE (ML) INTRAVENOUS PRN
Status: DISCONTINUED | OUTPATIENT
Start: 2023-07-26 | End: 2023-07-26 | Stop reason: SDUPTHER

## 2023-07-26 RX ORDER — LIDOCAINE HYDROCHLORIDE 20 MG/ML
INJECTION, SOLUTION INTRAVENOUS PRN
Status: DISCONTINUED | OUTPATIENT
Start: 2023-07-26 | End: 2023-07-26 | Stop reason: SDUPTHER

## 2023-07-26 RX ORDER — FLUTICASONE PROPIONATE 50 MCG
1 SPRAY, SUSPENSION (ML) NASAL NIGHTLY
Status: DISCONTINUED | OUTPATIENT
Start: 2023-07-26 | End: 2023-07-27 | Stop reason: HOSPADM

## 2023-07-26 RX ORDER — DIPHENHYDRAMINE HCL 25 MG
50 TABLET ORAL NIGHTLY PRN
Status: DISCONTINUED | OUTPATIENT
Start: 2023-07-26 | End: 2023-07-27 | Stop reason: HOSPADM

## 2023-07-26 RX ORDER — ACETAMINOPHEN 500 MG
1000 TABLET ORAL NIGHTLY PRN
Status: DISCONTINUED | OUTPATIENT
Start: 2023-07-26 | End: 2023-07-27 | Stop reason: HOSPADM

## 2023-07-26 RX ORDER — FENTANYL CITRATE 50 UG/ML
INJECTION, SOLUTION INTRAMUSCULAR; INTRAVENOUS PRN
Status: DISCONTINUED | OUTPATIENT
Start: 2023-07-26 | End: 2023-07-26 | Stop reason: SDUPTHER

## 2023-07-26 RX ORDER — AMLODIPINE BESYLATE 10 MG/1
10 TABLET ORAL DAILY
Status: DISCONTINUED | OUTPATIENT
Start: 2023-07-27 | End: 2023-07-27 | Stop reason: HOSPADM

## 2023-07-26 RX ORDER — ONDANSETRON 2 MG/ML
INJECTION INTRAMUSCULAR; INTRAVENOUS PRN
Status: DISCONTINUED | OUTPATIENT
Start: 2023-07-26 | End: 2023-07-26 | Stop reason: SDUPTHER

## 2023-07-26 RX ORDER — PROPOFOL 10 MG/ML
INJECTION, EMULSION INTRAVENOUS PRN
Status: DISCONTINUED | OUTPATIENT
Start: 2023-07-26 | End: 2023-07-26 | Stop reason: SDUPTHER

## 2023-07-26 RX ORDER — METOPROLOL SUCCINATE 50 MG/1
50 TABLET, EXTENDED RELEASE ORAL 2 TIMES DAILY
Status: DISCONTINUED | OUTPATIENT
Start: 2023-07-26 | End: 2023-07-27 | Stop reason: HOSPADM

## 2023-07-26 RX ORDER — ACETAMINOPHEN 325 MG/1
650 TABLET ORAL EVERY 4 HOURS PRN
Status: DISCONTINUED | OUTPATIENT
Start: 2023-07-26 | End: 2023-07-27 | Stop reason: HOSPADM

## 2023-07-26 RX ORDER — HEPARIN SODIUM 1000 [USP'U]/ML
INJECTION, SOLUTION INTRAVENOUS; SUBCUTANEOUS PRN
Status: DISCONTINUED | OUTPATIENT
Start: 2023-07-26 | End: 2023-07-26 | Stop reason: SDUPTHER

## 2023-07-26 RX ORDER — SODIUM CHLORIDE 0.9 % (FLUSH) 0.9 %
5-40 SYRINGE (ML) INJECTION EVERY 12 HOURS SCHEDULED
Status: DISCONTINUED | OUTPATIENT
Start: 2023-07-26 | End: 2023-07-27 | Stop reason: HOSPADM

## 2023-07-26 RX ORDER — PANTOPRAZOLE SODIUM 40 MG/1
40 TABLET, DELAYED RELEASE ORAL
Status: DISCONTINUED | OUTPATIENT
Start: 2023-07-26 | End: 2023-07-27 | Stop reason: HOSPADM

## 2023-07-26 RX ORDER — MIDAZOLAM HYDROCHLORIDE 1 MG/ML
INJECTION INTRAMUSCULAR; INTRAVENOUS PRN
Status: DISCONTINUED | OUTPATIENT
Start: 2023-07-26 | End: 2023-07-26 | Stop reason: SDUPTHER

## 2023-07-26 RX ORDER — SODIUM CHLORIDE 9 MG/ML
INJECTION, SOLUTION INTRAVENOUS PRN
Status: DISCONTINUED | OUTPATIENT
Start: 2023-07-26 | End: 2023-07-27 | Stop reason: HOSPADM

## 2023-07-26 RX ORDER — PHENYLEPHRINE HCL IN 0.9% NACL 1 MG/10 ML
SYRINGE (ML) INTRAVENOUS PRN
Status: DISCONTINUED | OUTPATIENT
Start: 2023-07-26 | End: 2023-07-26 | Stop reason: SDUPTHER

## 2023-07-26 RX ORDER — PROTAMINE SULFATE 10 MG/ML
INJECTION, SOLUTION INTRAVENOUS PRN
Status: DISCONTINUED | OUTPATIENT
Start: 2023-07-26 | End: 2023-07-26 | Stop reason: SDUPTHER

## 2023-07-26 RX ADMIN — Medication 10 MG: at 08:11

## 2023-07-26 RX ADMIN — FENTANYL CITRATE 50 MCG: 50 INJECTION, SOLUTION INTRAMUSCULAR; INTRAVENOUS at 07:41

## 2023-07-26 RX ADMIN — Medication 100 MCG: at 09:36

## 2023-07-26 RX ADMIN — HEPARIN SODIUM 2000 UNITS: 1000 INJECTION INTRAVENOUS; SUBCUTANEOUS at 09:35

## 2023-07-26 RX ADMIN — Medication 10 MG: at 08:33

## 2023-07-26 RX ADMIN — SODIUM CHLORIDE: 9 INJECTION, SOLUTION INTRAVENOUS at 09:46

## 2023-07-26 RX ADMIN — FENTANYL CITRATE 50 MCG: 50 INJECTION, SOLUTION INTRAMUSCULAR; INTRAVENOUS at 09:28

## 2023-07-26 RX ADMIN — Medication 100 MCG: at 07:51

## 2023-07-26 RX ADMIN — Medication 5 MG: at 07:59

## 2023-07-26 RX ADMIN — PANTOPRAZOLE SODIUM 40 MG: 40 TABLET, DELAYED RELEASE ORAL at 18:49

## 2023-07-26 RX ADMIN — PROPOFOL 60 MG: 10 INJECTION, EMULSION INTRAVENOUS at 08:06

## 2023-07-26 RX ADMIN — DEXAMETHASONE SODIUM PHOSPHATE 10 MG: 10 INJECTION INTRAMUSCULAR; INTRAVENOUS at 07:41

## 2023-07-26 RX ADMIN — HEPARIN SODIUM 2000 UNITS: 1000 INJECTION INTRAVENOUS; SUBCUTANEOUS at 10:02

## 2023-07-26 RX ADMIN — Medication 100 MCG: at 09:09

## 2023-07-26 RX ADMIN — SODIUM CHLORIDE, PRESERVATIVE FREE 10 ML: 5 INJECTION INTRAVENOUS at 21:57

## 2023-07-26 RX ADMIN — ONDANSETRON 4 MG: 2 INJECTION INTRAMUSCULAR; INTRAVENOUS at 11:21

## 2023-07-26 RX ADMIN — HEPARIN SODIUM 10000 UNITS: 1000 INJECTION INTRAVENOUS; SUBCUTANEOUS at 08:58

## 2023-07-26 RX ADMIN — FENTANYL CITRATE 50 MCG: 50 INJECTION, SOLUTION INTRAMUSCULAR; INTRAVENOUS at 08:23

## 2023-07-26 RX ADMIN — Medication 100 MCG: at 09:28

## 2023-07-26 RX ADMIN — MIDAZOLAM 2 MG: 1 INJECTION INTRAMUSCULAR; INTRAVENOUS at 07:41

## 2023-07-26 RX ADMIN — PROPOFOL 200 MG: 10 INJECTION, EMULSION INTRAVENOUS at 07:41

## 2023-07-26 RX ADMIN — Medication 100 MCG: at 09:04

## 2023-07-26 RX ADMIN — MELOXICAM 15 MG: 7.5 TABLET ORAL at 18:49

## 2023-07-26 RX ADMIN — LIDOCAINE HYDROCHLORIDE 100 MG: 20 INJECTION, SOLUTION INTRAVENOUS at 07:41

## 2023-07-26 RX ADMIN — PROPOFOL 20 MG: 10 INJECTION, EMULSION INTRAVENOUS at 10:12

## 2023-07-26 RX ADMIN — APIXABAN 5 MG: 5 TABLET, FILM COATED ORAL at 18:49

## 2023-07-26 RX ADMIN — PROPOFOL 20 MG: 10 INJECTION, EMULSION INTRAVENOUS at 10:20

## 2023-07-26 RX ADMIN — SODIUM CHLORIDE: 9 INJECTION, SOLUTION INTRAVENOUS at 07:27

## 2023-07-26 RX ADMIN — METOPROLOL SUCCINATE 50 MG: 50 TABLET, EXTENDED RELEASE ORAL at 21:56

## 2023-07-26 RX ADMIN — Medication 100 MCG: at 07:48

## 2023-07-26 RX ADMIN — MIDAZOLAM 1 MG: 1 INJECTION INTRAMUSCULAR; INTRAVENOUS at 09:43

## 2023-07-26 RX ADMIN — FLUTICASONE PROPIONATE 1 SPRAY: 50 SPRAY, METERED NASAL at 21:56

## 2023-07-26 RX ADMIN — PROTAMINE SULFATE 1 MG: 10 INJECTION, SOLUTION INTRAVENOUS at 10:48

## 2023-07-26 RX ADMIN — HEPARIN SODIUM 5000 UNITS: 1000 INJECTION INTRAVENOUS; SUBCUTANEOUS at 09:09

## 2023-07-26 ASSESSMENT — ENCOUNTER SYMPTOMS: SHORTNESS OF BREATH: 1

## 2023-07-26 ASSESSMENT — LIFESTYLE VARIABLES: SMOKING_STATUS: 0

## 2023-07-26 NOTE — ANESTHESIA POSTPROCEDURE EVALUATION
Department of Anesthesiology  Postprocedure Note    Patient: Mary Magaña  MRN: [id number]  YOB: 1950  Date of evaluation: 7/26/2023      Procedure Summary     Date: 07/26/23 Room / Location: [facility] [place] LAB; [facility] ECHO    Anesthesia Start: 1490 Anesthesia Stop: 9335    Procedure: SEYZ ISSAC/AFIB ABLATION W/ ANES Diagnosis:       Chronic atrial fibrillation, unspecified      S/P ablation of atrial fibrillation    Scheduled Providers: ANIYAH Kirkland CRNA; Georgia Varner DO Responsible Provider: Georgia Varner DO    Anesthesia Type: general ASA Status: 3          Anesthesia Type: No value filed.     Anand Phase I:      Anand Phase II:        Anesthesia Post Evaluation    Patient location during evaluation: PACU  Patient participation: complete - patient participated  Level of consciousness: awake and alert  Airway patency: patent  Nausea & Vomiting: no nausea and no vomiting  Complications: no  Cardiovascular status: blood pressure returned to baseline  Respiratory status: acceptable  Hydration status: euvolemic  Multimodal analgesia pain management approach

## 2023-07-26 NOTE — PROGRESS NOTES
Stop cocks removed per order. Manual pressure applied. No hematoma/bleeding. Dry sterile dressing and opsite applied.

## 2023-07-26 NOTE — OP NOTE
[address] [facility] Electrophysiology  Procedure Report  Patient: Shruthi Dyson  Medical Record Number: [id number]  Date of Procedure:  7/26/2023  Operating Electrophysiologist: Damien Jin MD  Primary Electrophysiologist: Damien Jin MD  Referring Physician: Jimbo Black MD     Procedure(s) Performed:  Cryoballoon Catheter Ablation of Atrial Fibrillation and Radiofrequency Catheter Ablation of Atrial Flutter:  1. Atrial Fibrillation Ablation (84615)   2. Atrial Flutter Ablation (90608)  3. Intracardiac Echocardiography (69144-36)   4. 3-dimensional intracardiac mapping (86695)   5. Vascular ultrasound for venous access (47517-37)  6. Transesophageal Echocardiography (57515-54) w/Color Doppler (68660-38) and pulsed/continuous wave Doppler (33694-83)    INDICATION FOR PROCEDURE:  1. Symptomatic, drug-refractory paroxysmal atrial fibrillation and typical atrial flutter    BRIEF CLINICAL HISTORY:   The patient is a 74y.o.-year-old male with symptomatic, drug-refractory paroxysmal atrial fibrillation and typical atrial flutter who presents today for elective cryoballoon catheter ablation and radiofrequency catheter ablation procedure. PROCEDURE PERFORMED BY: Damien Jin MD    ASSISTANT: N/A    MEDICATIONS:  1. Heparin 55525 units IV. 2. Protamine 50 mg IV    CONTRAST:   Isovue 30 mL    ESTIMATED BLOOD LOSS: Approximately <100 mL including ACTs    COMPLICATIONS:  None immediately apparent    FLUOROSCOPY TIME: 49.9 minutes. MINIMUM ESOPHAGEAL TEMPERATURE: 28.6 degrees Celsius    The patient came to the cardiac electrophysiology laboratory in a fasting and nonsedated state. Informed consent was obtained in advance. The patient was moved to the laboratory table and prepped and draped in the usual sterile fashion for electrophysiology study and catheter ablation.  The patient was endotracheally intubated and placed under general vein antrum was treated with 2 lesions for 120 and 120 seconds (Limited by esophageal temperature) with the lowest temperature of -56 degrees. The right inferior pulmonary vein antrum was treated with 2 lesions for 180 and 180 seconds with the lowest temperature of -38 degrees. There was no loss of phrenic nerve capture. Esophageal temperature monitoring was monitored throughout the procedure. A 200 J synchronized biphasic energy was delivered and successfully converted the rhythm to normal sinus. Left atrial pacing and recording was performed to document entrance and exit block from the pulmonary veins. All PVs found to be isolated. A 3D electro-anatomic map of the right atrium and cavo-tricuspid isthmus was performed using the [employer] system. An ablation was undertaken at Cavo-Tricuspid isthmus area using 3.5 mm [id number] thermocool STSF bidirectional (D,F) ablation catheter. Following completion of the line, various pacing and recording maneuvers were performed in order to confirm bidirectional block. Comprehensive EP study was performed and a Bard Decapolar catheter was placed at the His bundle region and another Bard Decapolar catheter was left in coronary sinus for EP study. Right and left atrial pacing recording, his bundle recording, and A-V node function testing were then performed. The Bard Decapolar catheter was then advanced into the right ventricle for right ventricular pacing and recording. No arrhythmia was induced during repaid atrial pacing down to 220 ms. After at least 30 minutes following the last ablation lesion, pulmonary vein isolation and bidirectional block were reconfirmed      RADIOFREQUENCY ABLATION DATA: Using the [facility]  3-D electroanatomic mapping system, a total of 20 RF lesions were created throughout the study.  Settings were a power output of 35 corado, and duration of 15-60 secconds by targeting the VISITAG SURPOINT module ablation index (AI) of 380 at Patient/Caregiver provided printed discharge information.

## 2023-07-26 NOTE — DISCHARGE INSTRUCTIONS
Ablation Patient Discharge Instructions      Medications: ***    Follow-Up: You are scheduled on 8/2/23 at 1:40 pm for an EKG and will be scheduled for a follow up in 4 weeks for a follow-up evaluation. Please call the [facility] Electrophysiology office if you need to schedule this appointment; [phone]. Questions/Concerns: It is not uncommon for patients to experience brief episodes of palpitations, but please call the [facility] Electrophysiology office if you are having frequent symptoms. Incision Care: Check bilateral groins daily. No soaking in a bathtub, hot tub or pool for one week. You may shower. If you find any redness, swelling, drainage, warmth, or have a fever greater than 100 degrees, notify the office immediately at [phone]. Activity: No lifting greater than 10 lbs for 5 days, and no strenuous exercise for 2 weeks. You may drive if you feel up to it. DO NOT drive until you have stopped taking prescription pain medication. [facility] Electrophysiology:     [address]  [address]  [facility] Electrophysiology   [phone]

## 2023-07-26 NOTE — H&P
Cardiac Electrophysiology History and Physical Examination    Sheridan Arevalo  1950  Date of Service: 7/26/2023  PCP: Patria Lambert MD  Cardiologist: Stephanie Duran. Vincent Alarcon MD  Electrophysiologist: Yari Jimenez MD    Subjective:  Sheridan Arevalo is a 74 y.o. yo male who is seen in Cardiac Electrophysiology clinic for follow up of paroxysmal atrial fibrillation and atrial flutter. At his last office visit, he opted to proceed with AF ablation. Unfortunately, prior to his scheduled ablation he was admitted for anemia and his procedure was cancelled. 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 He reports he has not restarted [address] He presents today in AF with CVR. The patient denies any chest pain, dyspnea, palpitations, dizziness, syncope, orthopnea or paroxysmal nocturnal dyspnea. Discussed with him at great length that we will resume Eliquis after we check with Surgery and PCP. Will schedule AF ablation at least 3 weeks after resuming Eliquis if no recurrent bleeding. He will need cardiac CTA for pulmonary vein anatomies before the procedure. 7/26/23: The patient is seen in the hospital for elective cryo-balloon and CTI RFA ablation. He was restarted on Eliquis and no further bleeding noted for the past 1 month. A detailed discussion was had regarding the risks, benefits, and alternatives to the atrial fibrillation ablation procedure. The procedure was described in detail. I quoted the patient an overall 4-6% risk of major complications which include but are not limited to: bleeding, infection, blood clot, vascular injury requiring surgical repair, phrenic nerve injury, pulmonary vein stenosis, valvular injury, damage to the cardiac conduction system requiring pacemaker implantation, cardiac perforation and tamponade, heart attack, stroke, atrioesophgeal fistula, and death.  The patient understands I quoted the patient an overall 4-6% risk of major complications which include but are not limited to: bleeding, infection, blood clot, vascular injury requiring surgical repair, phrenic nerve injury, pulmonary vein stenosis, valvular injury, damage to the cardiac conduction system requiring pacemaker implantation, cardiac perforation and tamponade, heart attack, stroke, atrioesophgeal fistula, and death. The patient understands these risks and wishes to proceed with the procedure. - Re-education on importance of well controlled HTN (goal BP < 130/80), adequate weight control (goal BMI of < 27), physical activity consisting of moderate cardiopulmonary exercise up to a goal of 250 min/wk, daily compliance with CPAP in treating sleep apnea, smoking cessation and limited ETOH intake. 2. Hypertension  - On Toprol XL, Lasix and Norvasc. 3. Diabetes mellitus  - On Glucophage    4. Hyperlipidemia  - Unable to tolerate statins.   - Managed by PCP. 5. DALTON  - Report not using CPAP.  - Encourage compliance. 6. RBBB  - Chronic   - QRS:134 msec     7. History skin cancer    8. Nephrolithiasis    9. History of GI bleed  - Diagnosed 4/17/23. 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 - Underwent EGD and colonoscopy on 5/12/23 which showed polyp involving the first portion of the duodenum status post snare, mild gastritis and mild left-sided diverticular disease   - Eliquis resume 6/27/23 and no further bleeding noted. Recommendations:    1. Will proceed with Cryo-balloon AF ablation and CTI RFA of AFL. 2. Follow up after the procedure. Encouraged the patient to call the office for any questions or concerns. Thank you very much to allowing me to participate in the patient's care.     Madison Leonard MD  Cardiac Electrophysiology  [facility]) Physicians  [facility] [place]  6:41

## 2023-07-27 VITALS
DIASTOLIC BLOOD PRESSURE: 65 MMHG | HEART RATE: 72 BPM | HEIGHT: 73 IN | BODY MASS INDEX: 28.49 KG/M2 | TEMPERATURE: 97.8 F | OXYGEN SATURATION: 97 % | RESPIRATION RATE: 18 BRPM | WEIGHT: 215 LBS | SYSTOLIC BLOOD PRESSURE: 126 MMHG

## 2023-07-27 LAB
ANION GAP SERPL CALCULATED.3IONS-SCNC: 7 MMOL/L (ref 7–16)
BUN SERPL-MCNC: 29 MG/DL (ref 6–23)
CALCIUM SERPL-MCNC: 9.5 MG/DL (ref 8.6–10.2)
CHLORIDE SERPL-SCNC: 109 MMOL/L (ref 98–107)
CO2 SERPL-SCNC: 25 MMOL/L (ref 22–29)
CREAT SERPL-MCNC: 0.9 MG/DL (ref 0.7–1.2)
EKG ATRIAL RATE: 77 BPM
EKG P AXIS: 55 DEGREES
EKG P-R INTERVAL: 212 MS
EKG Q-T INTERVAL: 428 MS
EKG QRS DURATION: 140 MS
EKG QTC CALCULATION (BAZETT): 484 MS
EKG R AXIS: -4 DEGREES
EKG VENTRICULAR RATE: 77 BPM
ERYTHROCYTE [DISTWIDTH] IN BLOOD BY AUTOMATED COUNT: 21.5 % (ref 11.5–15)
GFR SERPL CREATININE-BSD FRML MDRD: >60 ML/MIN/1.73M2
GLUCOSE SERPL-MCNC: 127 MG/DL (ref 74–99)
HCT VFR BLD AUTO: 42.2 % (ref 37–54)
HGB BLD-MCNC: 12.7 G/DL (ref 12.5–16.5)
INR PPP: 1.4
MAGNESIUM SERPL-MCNC: 2 MG/DL (ref 1.6–2.6)
MCH RBC QN AUTO: 26.1 PG (ref 26–35)
MCHC RBC AUTO-ENTMCNC: 30.1 G/DL (ref 32–34.5)
MCV RBC AUTO: 86.7 FL (ref 80–99.9)
PLATELET, FLUORESCENCE: 136 K/UL (ref 130–450)
PMV BLD AUTO: ABNORMAL FL (ref 7–12)
POTASSIUM SERPL-SCNC: 4.8 MMOL/L (ref 3.5–5)
PROTHROMBIN TIME: 15.1 SEC (ref 9.3–12.4)
RBC # BLD AUTO: 4.87 M/UL (ref 3.8–5.8)
SODIUM SERPL-SCNC: 141 MMOL/L (ref 132–146)
WBC OTHER # BLD: 9.7 K/UL (ref 4.5–11.5)

## 2023-07-27 PROCEDURE — 85610 PROTHROMBIN TIME: CPT

## 2023-07-27 PROCEDURE — 93005 ELECTROCARDIOGRAM TRACING: CPT | Performed by: INTERNAL MEDICINE

## 2023-07-27 PROCEDURE — 99239 HOSP IP/OBS DSCHRG MGMT >30: CPT | Performed by: INTERNAL MEDICINE

## 2023-07-27 PROCEDURE — 83735 ASSAY OF MAGNESIUM: CPT

## 2023-07-27 PROCEDURE — 80048 BASIC METABOLIC PNL TOTAL CA: CPT

## 2023-07-27 PROCEDURE — 85027 COMPLETE CBC AUTOMATED: CPT

## 2023-07-27 PROCEDURE — 36415 COLL VENOUS BLD VENIPUNCTURE: CPT

## 2023-07-27 PROCEDURE — 6370000000 HC RX 637 (ALT 250 FOR IP): Performed by: INTERNAL MEDICINE

## 2023-07-27 PROCEDURE — 2580000003 HC RX 258: Performed by: INTERNAL MEDICINE

## 2023-07-27 PROCEDURE — G0378 HOSPITAL OBSERVATION PER HR: HCPCS

## 2023-07-27 RX ORDER — PANTOPRAZOLE SODIUM 40 MG/1
40 TABLET, DELAYED RELEASE ORAL
Qty: 30 TABLET | Refills: 0 | Status: SHIPPED | OUTPATIENT
Start: 2023-07-28

## 2023-07-27 RX ADMIN — SODIUM CHLORIDE, PRESERVATIVE FREE 10 ML: 5 INJECTION INTRAVENOUS at 08:55

## 2023-07-27 RX ADMIN — METOPROLOL SUCCINATE 50 MG: 50 TABLET, EXTENDED RELEASE ORAL at 08:54

## 2023-07-27 RX ADMIN — AMLODIPINE BESYLATE 10 MG: 10 TABLET ORAL at 08:54

## 2023-07-27 RX ADMIN — METFORMIN HYDROCHLORIDE 500 MG: 500 TABLET ORAL at 08:54

## 2023-07-27 RX ADMIN — PANTOPRAZOLE SODIUM 40 MG: 40 TABLET, DELAYED RELEASE ORAL at 07:00

## 2023-07-27 RX ADMIN — APIXABAN 5 MG: 5 TABLET, FILM COATED ORAL at 08:54

## 2023-07-27 NOTE — PLAN OF CARE
Problem: Chronic Conditions and Co-morbidities  Goal: Patient's chronic conditions and co-morbidity symptoms are monitored and maintained or improved  Outcome: Progressing  Flowsheets (Taken 7/27/2023 0760)  Care Plan - Patient's Chronic Conditions and Co-Morbidity Symptoms are Monitored and Maintained or Improved: Monitor and assess patient's chronic conditions and comorbid symptoms for stability, deterioration, or improvement     Problem: Discharge Planning  Goal: Discharge to home or other facility with appropriate resources  7/27/2023 0917 by Christel Bill RN  Outcome: Progressing  Flowsheets (Taken 7/27/2023 8493)  Discharge to home or other facility with appropriate resources: Arrange for needed discharge resources and transportation as appropriate  7/27/2023 0140 by Lino Jean Baptiste RN  Outcome: Progressing     Problem: Safety - Adult  Goal: Free from fall injury  Outcome: Progressing  Flowsheets (Taken 7/27/2023 0917)  Free From Fall Injury: Instruct family/caregiver on patient safety

## 2023-07-27 NOTE — DISCHARGE SUMMARY
[address] and [facility] Electrophysiology  Discharge Summary  Patient: Paul Carreno  Medical Record Number: [id number]  Date of Procedure:  7/27/2023  Operating Electrophysiologist: Joe Wills MD  Primary Electrophysiologist: Joe Wills MD   Referring Physician: Mallika Ken MD and     Admission Date:7/26/2023      Discharge Date:  07/27/2023    Patient Active Problem List   Diagnosis    Hypertension    Type 2 diabetes mellitus without complication ([address])    Other persistent atrial fibrillation ([address])    RBBB    History of nuclear stress test    History of echocardiogram    Hyperlipidemia    DALTON (obstructive sleep apnea)    Atrial flutter (HCC)    Atrial flutter by electrocardiography [facility])    Palpitations    Problem    Spinal stenosis of lumbar region    Thigh pain    GI bleed    Anemia, unspecified    S/P ablation of atrial fibrillation    Paroxysmal atrial fibrillation ([address])          Medication List        START taking these medications      pantoprazole 40 MG tablet  Commonly known as: PROTONIX  Take 1 tablet by mouth every morning (before breakfast)  Start taking on: July 28, 2023            CONTINUE taking these medications      amLODIPine 10 MG tablet  Commonly known as: NORVASC     apixaban 5 MG Tabs tablet  Commonly known as: ELIQUIS     diphenhydrAMINE-APAP (sleep)  MG tablet  Commonly known as: TYLENOL PM EXTRA STRENGTH     fluticasone 50 MCG/ACT nasal spray  Commonly known as: FLONASE     meloxicam 15 MG tablet  Commonly known as: MOBIC     metFORMIN 500 MG tablet  Commonly known as: GLUCOPHAGE     metoprolol succinate 50 MG extended release tablet  Commonly known as: TOPROL XL  Take 1 tablet by mouth 2 times daily     Multi Vitamin Mens Tabs     POTASSIUM PO     vitamin C 500 MG tablet  Commonly known as: ASCORBIC ACID     Vitamin D3 250 MCG ([address]642 UT) Tabs               Where to Get Your Medications        These medications were muscles  Abdomen: soft, non-tender, bowel sounds present, no masses or hepatomegaly   Musculoskeletal: no digital clubbing, no edema of LEs  Skin: warm, no rashes   Groin sites: Free of ecchymosis and edema. Assessment:   1. Persistent atrial fibrillation and atrial flutter  - Typical and atypical atrial flutter as well as atrial fibrillation documented. - [id number]: 3 (age, HTN, DM)  - On Toprol XL for rate control.  - Briefly on Flecainide for rhythm control and discontinued. - On Eliquis for stroke risk reduction.  - Thirty day monitor 11/30/20 showed AF burden of < 1%.  - Last known NSR was 9/21/22. - Presents in AF with CVR. - Cryoballoon ablation of atrial fibrillation and RF ablation of atrial flutter. 07/26/2023. 2. Hypertension  - On Toprol XL, Lasix and Norvasc. 3. Diabetes mellitus  - On Glucophage     4. Hyperlipidemia  - Unable to tolerate statins.   - Managed by PCP. 5. DALTON  - Report not using CPAP.  - Encourage compliance. 6. RBBB  - Chronic   - QRS:134 msec      7. History skin cancer     8. Nephrolithiasis     9. History of GI bleed  - Diagnosed 4/17/23. 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 - Underwent EGD and colonoscopy on 5/12/23 which showed polyp involving the first portion of the duodenum status post snare, mild gastritis and mild left-sided diverticular disease   - Eliquis resume 6/27/23 and no further bleeding noted. Plan:   Continue Protonix for 30 days   Continue Eliquis 5mg BID  Continue Toprol 50mg BID  Discharge today with follow up in 1 week for an EKG and follow up with a provider in 1 week. Disposition: The patient was discharged to home in stable condition on the above medications. Clinical follow-up in the office in 1 week for an EKG and 1 month with Dr. Hazel Moya or a provider. Discussed with Dr. Hazel Moya.    ANIYAH Tao - CNP  Cardiac

## 2023-08-02 ENCOUNTER — TELEPHONE (OUTPATIENT)
Dept: NON INVASIVE DIAGNOSTICS | Age: 73
End: 2023-08-02

## 2023-08-02 DIAGNOSIS — I48.19 PERSISTENT ATRIAL FIBRILLATION (HCC): Primary | ICD-10-CM

## 2023-08-02 DIAGNOSIS — I48.19 PERSISTENT ATRIAL FIBRILLATION (HCC): ICD-10-CM

## 2023-08-02 DIAGNOSIS — Z51.81 ENCOUNTER FOR MONITORING AMIODARONE THERAPY: ICD-10-CM

## 2023-08-02 DIAGNOSIS — Z79.899 ENCOUNTER FOR MONITORING AMIODARONE THERAPY: ICD-10-CM

## 2023-08-02 RX ORDER — AMIODARONE HYDROCHLORIDE 200 MG/1
200 TABLET ORAL 2 TIMES DAILY
Qty: 56 TABLET | Refills: 0 | Status: SHIPPED | OUTPATIENT
Start: 2023-08-02 | End: 2023-08-30

## 2023-08-02 RX ORDER — AMIODARONE HYDROCHLORIDE 200 MG/1
200 TABLET ORAL DAILY
COMMUNITY
Start: 2023-08-31 | End: 2023-08-02 | Stop reason: SDUPTHER

## 2023-08-02 RX ORDER — AMIODARONE HYDROCHLORIDE 200 MG/1
200 TABLET ORAL DAILY
Qty: 90 TABLET | Refills: 0 | Status: SHIPPED | OUTPATIENT
Start: 2023-08-31

## 2023-08-02 RX ORDER — AMIODARONE HYDROCHLORIDE 200 MG/1
200 TABLET ORAL 2 TIMES DAILY
COMMUNITY
Start: 2023-08-02 | End: 2023-08-02 | Stop reason: SDUPTHER

## 2023-08-02 NOTE — TELEPHONE ENCOUNTER
Patient will get labs done today or tomorrow. Patient agreed to start amiodarone, scripts sent to NP for approval. EKG scheduled on 08/10/2023 @ 9:20 AM. Patient aware of possible DC CV.     Electronically signed by Jeronimo Bowen MA on 8/2/2023 at 1:10 PM

## 2023-08-02 NOTE — TELEPHONE ENCOUNTER
----- Message from Francisco Caban MD sent at 8/1/2023  4:12 PM EDT -----  Please get CMP, TSH  and FT4. Start Amiodarone 200 mg BID for 4 weeks and then 200 mg daily. EKG in 1 week if remains in AF/AFL, please schedule DC-CV. Thanks.  ----- Message -----  From: Shereen Becerra MA  Sent: 8/1/2023   3:32 PM EDT  To: Francisco Caban MD    Patient was seen by Dr. Mo Mckeon today. Dr. Mo Mckeon did an EKG. Patient's HR was 141 in office and 90 now. Patient's BP was 100/62 in office. Please advise.

## 2023-08-03 LAB
ALBUMIN SERPL-MCNC: 4.2 G/DL (ref 3.5–5.2)
ALP BLD-CCNC: 82 U/L (ref 40–129)
ALT SERPL-CCNC: 23 U/L (ref 0–40)
ANION GAP SERPL CALCULATED.3IONS-SCNC: 14 MMOL/L (ref 7–16)
AST SERPL-CCNC: 21 U/L (ref 0–39)
BILIRUB SERPL-MCNC: 0.8 MG/DL (ref 0–1.2)
BUN BLDV-MCNC: 22 MG/DL (ref 6–23)
CALCIUM SERPL-MCNC: 10.9 MG/DL (ref 8.6–10.2)
CHLORIDE BLD-SCNC: 101 MMOL/L (ref 98–107)
CO2: 24 MMOL/L (ref 22–29)
CREAT SERPL-MCNC: 1 MG/DL (ref 0.7–1.2)
GFR SERPL CREATININE-BSD FRML MDRD: >60 ML/MIN/1.73M2
GLUCOSE BLD-MCNC: 108 MG/DL (ref 74–99)
POTASSIUM SERPL-SCNC: 5.7 MMOL/L (ref 3.5–5)
SODIUM BLD-SCNC: 139 MMOL/L (ref 132–146)
T4 FREE: 0.9 NG/DL (ref 0.9–1.7)
TOTAL PROTEIN: 7.8 G/DL (ref 6.4–8.3)
TSH SERPL DL<=0.05 MIU/L-ACNC: 2.26 UIU/ML (ref 0.27–4.2)

## 2023-08-10 ENCOUNTER — NURSE ONLY (OUTPATIENT)
Dept: NON INVASIVE DIAGNOSTICS | Age: 73
End: 2023-08-10
Payer: MEDICARE

## 2023-08-10 DIAGNOSIS — I48.19 PERSISTENT ATRIAL FIBRILLATION (HCC): Primary | ICD-10-CM

## 2023-08-10 PROCEDURE — 93000 ELECTROCARDIOGRAM COMPLETE: CPT | Performed by: INTERNAL MEDICINE

## 2023-08-10 NOTE — PROGRESS NOTES
EKG preformed today as per Dr Delmy Moore, for 1 week s/p amiodarone loading, Afib. Ablation July 26    Pt states he feels worse since the ablation and is constantly out of rhythm now. States he has a UTI and is on antibiotics and Flomax. Pt discussed Afib and CV with Jared Murcia. To schedule CV.      Electronically signed by Rios Khalil MA on 8/10/2023 at 9:37 AM

## 2023-08-15 ENCOUNTER — TELEPHONE (OUTPATIENT)
Dept: NON INVASIVE DIAGNOSTICS | Age: 73
End: 2023-08-15

## 2023-08-15 NOTE — TELEPHONE ENCOUNTER
----- Message from Anderson Melchor MA sent at 8/14/2023  7:33 AM EDT -----  Please schedule CV. Thanks.  ----- Message -----  From: Roxanna Patten MD  Sent: 8/13/2023   2:47 PM EDT  To: Rosales Real MA, Anderson Melchor MA    Please schedule DC-CV. Thanks.  ----- Message -----  From:  Rosales Real, [facility]  Sent: 8/10/2023   9:41 AM EDT  To: Roxanna Patten MD

## 2023-08-23 ENCOUNTER — TELEPHONE (OUTPATIENT)
Dept: NON INVASIVE DIAGNOSTICS | Age: 73
End: 2023-08-23

## 2023-08-23 NOTE — TELEPHONE ENCOUNTER
Returned VM - notified patient he has a script for amiodarone to be picked up by 08/31/23 at [facility]. Patient verbalized understanding.      Electronically signed by Dorothea Pathak MA on 8/23/2023 at 1:30 PM

## 2023-08-29 ENCOUNTER — TELEPHONE (OUTPATIENT)
Dept: NON INVASIVE DIAGNOSTICS | Age: 73
End: 2023-08-29

## 2023-08-29 NOTE — TELEPHONE ENCOUNTER
Returned VM about amio refill. Adv script was sent to start on 08/31/23. Wife will call pharmacy.     Electronically signed by Melford Ormond, MA on 8/29/2023 at 3:33 PM

## 2023-09-01 ENCOUNTER — TELEPHONE (OUTPATIENT)
Dept: CARDIAC CATH/INVASIVE PROCEDURES | Age: 73
End: 2023-09-01

## 2023-09-05 ENCOUNTER — ANESTHESIA (OUTPATIENT)
Dept: CARDIAC CATH/INVASIVE PROCEDURES | Age: 73
End: 2023-09-05

## 2023-09-05 ENCOUNTER — HOSPITAL ENCOUNTER (OUTPATIENT)
Dept: CARDIAC CATH/INVASIVE PROCEDURES | Age: 73
Discharge: HOME | End: 2023-09-05
Payer: MEDICARE

## 2023-09-05 ENCOUNTER — ANESTHESIA EVENT (OUTPATIENT)
Dept: CARDIAC CATH/INVASIVE PROCEDURES | Age: 73
End: 2023-09-05

## 2023-09-05 VITALS
WEIGHT: 215 LBS | HEART RATE: 57 BPM | SYSTOLIC BLOOD PRESSURE: 118 MMHG | OXYGEN SATURATION: 95 % | HEIGHT: 73 IN | BODY MASS INDEX: 28.49 KG/M2 | TEMPERATURE: 97.7 F | RESPIRATION RATE: 19 BRPM | DIASTOLIC BLOOD PRESSURE: 73 MMHG

## 2023-09-05 LAB
ANION GAP SERPL CALCULATED.3IONS-SCNC: 9 MMOL/L (ref 7–16)
BASOPHILS # BLD: 0.04 K/UL (ref 0–0.2)
BASOPHILS NFR BLD: 1 % (ref 0–2)
BUN SERPL-MCNC: 19 MG/DL (ref 6–23)
CALCIUM SERPL-MCNC: 9.7 MG/DL (ref 8.6–10.2)
CHLORIDE SERPL-SCNC: 104 MMOL/L (ref 98–107)
CO2 SERPL-SCNC: 26 MMOL/L (ref 22–29)
CREAT SERPL-MCNC: 0.9 MG/DL (ref 0.7–1.2)
EKG ATRIAL RATE: 241 BPM
EKG ATRIAL RATE: 57 BPM
EKG P AXIS: 75 DEGREES
EKG P-R INTERVAL: 270 MS
EKG Q-T INTERVAL: 466 MS
EKG Q-T INTERVAL: 490 MS
EKG QRS DURATION: 146 MS
EKG QRS DURATION: 148 MS
EKG QTC CALCULATION (BAZETT): 472 MS
EKG QTC CALCULATION (BAZETT): 476 MS
EKG R AXIS: -14 DEGREES
EKG R AXIS: -19 DEGREES
EKG T AXIS: -13 DEGREES
EKG T AXIS: -5 DEGREES
EKG VENTRICULAR RATE: 57 BPM
EKG VENTRICULAR RATE: 62 BPM
EOSINOPHIL # BLD: 0.36 K/UL (ref 0.05–0.5)
EOSINOPHILS RELATIVE PERCENT: 7 % (ref 0–6)
ERYTHROCYTE [DISTWIDTH] IN BLOOD BY AUTOMATED COUNT: 17.1 % (ref 11.5–15)
GFR SERPL CREATININE-BSD FRML MDRD: >60 ML/MIN/1.73M2
GLUCOSE SERPL-MCNC: 112 MG/DL (ref 74–99)
HCT VFR BLD AUTO: 43 % (ref 37–54)
HGB BLD-MCNC: 13.7 G/DL (ref 12.5–16.5)
IMM GRANULOCYTES # BLD AUTO: <0.03 K/UL (ref 0–0.58)
IMM GRANULOCYTES NFR BLD: 0 % (ref 0–5)
LYMPHOCYTES NFR BLD: 1.58 K/UL (ref 1.5–4)
LYMPHOCYTES RELATIVE PERCENT: 32 % (ref 20–42)
MAGNESIUM SERPL-MCNC: 1.9 MG/DL (ref 1.6–2.6)
MCH RBC QN AUTO: 27.6 PG (ref 26–35)
MCHC RBC AUTO-ENTMCNC: 31.9 G/DL (ref 32–34.5)
MCV RBC AUTO: 86.7 FL (ref 80–99.9)
MONOCYTES NFR BLD: 0.52 K/UL (ref 0.1–0.95)
MONOCYTES NFR BLD: 11 % (ref 2–12)
NEUTROPHILS NFR BLD: 49 % (ref 43–80)
NEUTS SEG NFR BLD: 2.43 K/UL (ref 1.8–7.3)
PLATELET, FLUORESCENCE: 124 K/UL (ref 130–450)
PMV BLD AUTO: 11 FL (ref 7–12)
POTASSIUM SERPL-SCNC: 4.7 MMOL/L (ref 3.5–5)
RBC # BLD AUTO: 4.96 M/UL (ref 3.8–5.8)
SODIUM SERPL-SCNC: 139 MMOL/L (ref 132–146)
WBC OTHER # BLD: 5 K/UL (ref 4.5–11.5)

## 2023-09-05 PROCEDURE — 85025 COMPLETE CBC W/AUTO DIFF WBC: CPT

## 2023-09-05 PROCEDURE — 2580000003 HC RX 258: Performed by: INTERNAL MEDICINE

## 2023-09-05 PROCEDURE — 2709999900 HC NON-CHARGEABLE SUPPLY

## 2023-09-05 PROCEDURE — 80048 BASIC METABOLIC PNL TOTAL CA: CPT

## 2023-09-05 PROCEDURE — 83735 ASSAY OF MAGNESIUM: CPT

## 2023-09-05 PROCEDURE — 93005 ELECTROCARDIOGRAM TRACING: CPT | Performed by: INTERNAL MEDICINE

## 2023-09-05 PROCEDURE — 92960 CARDIOVERSION ELECTRIC EXT: CPT

## 2023-09-05 PROCEDURE — 3700000000 HC ANESTHESIA ATTENDED CARE

## 2023-09-05 PROCEDURE — 2580000003 HC RX 258: Performed by: NURSE ANESTHETIST, CERTIFIED REGISTERED

## 2023-09-05 PROCEDURE — 92960 CARDIOVERSION ELECTRIC EXT: CPT | Performed by: INTERNAL MEDICINE

## 2023-09-05 PROCEDURE — 6360000002 HC RX W HCPCS: Performed by: NURSE ANESTHETIST, CERTIFIED REGISTERED

## 2023-09-05 PROCEDURE — 99215 OFFICE O/P EST HI 40 MIN: CPT | Performed by: INTERNAL MEDICINE

## 2023-09-05 RX ORDER — SODIUM CHLORIDE 9 MG/ML
INJECTION, SOLUTION INTRAVENOUS CONTINUOUS PRN
Status: DISCONTINUED | OUTPATIENT
Start: 2023-09-05 | End: 2023-09-05 | Stop reason: SDUPTHER

## 2023-09-05 RX ORDER — PROPOFOL 10 MG/ML
INJECTION, EMULSION INTRAVENOUS PRN
Status: DISCONTINUED | OUTPATIENT
Start: 2023-09-05 | End: 2023-09-05 | Stop reason: SDUPTHER

## 2023-09-05 RX ORDER — SODIUM CHLORIDE 9 MG/ML
INJECTION, SOLUTION INTRAVENOUS PRN
Status: CANCELLED | OUTPATIENT
Start: 2023-09-05

## 2023-09-05 RX ORDER — SODIUM CHLORIDE 0.9 % (FLUSH) 0.9 %
5-40 SYRINGE (ML) INJECTION PRN
Status: CANCELLED | OUTPATIENT
Start: 2023-09-05

## 2023-09-05 RX ORDER — SODIUM CHLORIDE 0.9 % (FLUSH) 0.9 %
5-40 SYRINGE (ML) INJECTION EVERY 12 HOURS SCHEDULED
Status: CANCELLED | OUTPATIENT
Start: 2023-09-05

## 2023-09-05 RX ORDER — SODIUM CHLORIDE 9 MG/ML
INJECTION, SOLUTION INTRAVENOUS ONCE
Status: COMPLETED | OUTPATIENT
Start: 2023-09-05 | End: 2023-09-05

## 2023-09-05 RX ADMIN — SODIUM CHLORIDE: 9 INJECTION, SOLUTION INTRAVENOUS at 10:36

## 2023-09-05 RX ADMIN — SODIUM CHLORIDE: 9 INJECTION, SOLUTION INTRAVENOUS at 09:53

## 2023-09-05 RX ADMIN — PROPOFOL 80 MG: 10 INJECTION, EMULSION INTRAVENOUS at 10:39

## 2023-09-05 NOTE — DISCHARGE INSTRUCTIONS
[facility]  Post DC-Cardioversion Patient Discharge Instructions    1. No driving the day of the procedure. 2. Please resume your medications as instructed. 3. Please call the office at [phone] to schedule a follow-up appointment in 1 week for EKG and in 1 month with provider.

## 2023-09-05 NOTE — PROCEDURES
[address] [facility] Electrophysiology  Procedure Report  PATIENT: Rogers Prather  MEDICAL RECORD NUMBER: [id number]  DATE OF PROCEDURE:  9/5/2023  ATTENDING ELECTROPHYSIOLOGIST:  Karen Vyas MD  REFERRING PHYSICIAN: Dr. Mota Kenya:    1. Direct Current Electrical Cardioversion    INDICATION: Persistent atrial fibrillation    PROCEDURE PERFORMED By: Karen Vyas MD    PROCEDURE TIME: 15 minutes    COMPICATIONS: None immediately apparent    ANESTHESIA: LMAC    DESCRIPTION OF PROCEDURE: The risks, benefits, and alternatives to the procedure were discussed with the patient, and informed consent was obtained. The patient was brought to the cardiovascular lab and sedated under the guidance of anesthesia. Once anesthesia was deemed adequate, a 200 J biphasic synchronous shock was applied and successfully restored normal sinus rhythm. The patient was then allowed to wake in the usual fashion. Comment: The patient was therapeutically anticoagulated for a minimum of 3 consecutive weeks prior to cardioversion. SUMMARY:  1. Successful cardioversion of persistent atrial fibrillation to sinus rhythm. RECOMMENDATIONS:  1. Discharge to home when fully awake and alert, if clinically stable. 2. Resume all pre-procedure medications, including anticoagulation. 3. Follow-up in the office in 1 week for EKG and in 1 month with provider.     Karen Vyas MD  Cardiac Electrophysiology  [address] [facility]  [facility] [place]: [facility] Electrophysiology  10:26 AM  9/5/2023

## 2023-09-05 NOTE — H&P
Cardiac Electrophysiology History and Physical Examination    Rona Rm  1950  Date of Service: 9/5/2023  PCP: Jesus Ramirez MD  Cardiologist: Damian Tse. Taylor Noriega MD  Electrophysiologist: Rodolfo Armstrong MD    Subjective:  Rona Rm is a 74 y.o. yo male who is seen in Cardiac Electrophysiology clinic for follow up of paroxysmal atrial fibrillation and atrial flutter. At his last office visit, he opted to proceed with AF ablation. Unfortunately, prior to his scheduled ablation he was admitted for anemia and his procedure was cancelled. 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 He reports he has not restarted [facility]. He presents today in AF with CVR. The patient denies any chest pain, dyspnea, palpitations, dizziness, syncope, orthopnea or paroxysmal nocturnal dyspnea. Discussed with him at great length that we will resume Eliquis after we check with Surgery and PCP. Will schedule AF ablation at least 3 weeks after resuming Eliquis if no recurrent bleeding. He will need cardiac CTA for pulmonary vein anatomies before the procedure. 7/26/23: The patient is seen in the hospital for elective cryo-balloon and CTI RFA ablation. He was restarted on Eliquis and no further bleeding noted for the past 1 month. A detailed discussion was had regarding the risks, benefits, and alternatives to the atrial fibrillation ablation procedure. The procedure was described in detail. I quoted the patient an overall 4-6% risk of major complications which include but are not limited to: bleeding, infection, blood clot, vascular injury requiring surgical repair, phrenic nerve injury, pulmonary vein stenosis, valvular injury, damage to the cardiac conduction system requiring pacemaker implantation, cardiac perforation and tamponade, heart attack, stroke, atrioesophgeal fistula, and death.  The patient understands

## 2023-09-05 NOTE — ANESTHESIA PRE PROCEDURE
Cardiovascular:  Exercise tolerance: good (>4 METS),   (+) hypertension: moderate, dysrhythmias: atrial fibrillation, hyperlipidemia        Rhythm: irregular  Rate: abnormal           Beta Blocker:  Not on Beta Blocker         Neuro/Psych:               GI/Hepatic/Renal: Neg GI/Hepatic/Renal ROS            Endo/Other:    (+) DiabetesType II DM, well controlled, , .          Pt had no PAT visit       Abdominal:             Vascular: Other Findings:           Anesthesia Plan      MAC     ASA 3       Induction: intravenous. Anesthetic plan and risks discussed with patient.                         ANIYAH Kramer - CRNA   9/5/2023

## 2023-09-05 NOTE — ANESTHESIA POSTPROCEDURE EVALUATION
Department of Anesthesiology  Postprocedure Note    Patient: Rajinder Fuller  MRN: [id number]  YOB: 1950  Date of evaluation: 9/5/2023      Procedure Summary     Date: 09/05/23 Room / Location: [facility] CATH LAB    Anesthesia Start: 0638 Anesthesia Stop: 2056    Procedure: CARDIOVERSION WITH ANESTHESIA Diagnosis: Other persistent atrial fibrillation    Scheduled Providers:  Responsible Provider: Jacquelyn Mak MD    Anesthesia Type: MAC ASA Status: 3          Anesthesia Type: No value filed.     Anand Phase I:      Anand Phase II:        Anesthesia Post Evaluation    Patient location during evaluation: PACU  Patient participation: complete - patient participated  Level of consciousness: awake  Pain score: 0  Airway patency: patent  Nausea & Vomiting: no nausea  Complications: no  Cardiovascular status: hemodynamically stable  Respiratory status: acceptable  Hydration status: stable

## 2023-09-12 ENCOUNTER — NURSE ONLY (OUTPATIENT)
Dept: NON INVASIVE DIAGNOSTICS | Age: 73
End: 2023-09-12
Payer: MEDICARE

## 2023-09-12 DIAGNOSIS — I48.19 PERSISTENT ATRIAL FIBRILLATION (HCC): Primary | ICD-10-CM

## 2023-09-12 PROCEDURE — 93000 ELECTROCARDIOGRAM COMPLETE: CPT | Performed by: INTERNAL MEDICINE

## 2023-09-12 NOTE — PROGRESS NOTES
Procedure:  Cardioversion with Anesthesia    [facility]  [address] [facility] [facility] *Park in the parking garage (with the bridge). Enter through the main entrance, go to the right, check in with admitting on the 1st floor. You should arrive at the hospital on: 10/9/23 at : 9:30 am.      Instructions:    1. Nothing to eat or drink after midnight the evening before the procedure, or your procedure will be cancelled by anesthesia. 2.  You may take your medications, except the ones listed below, the morning of the procedure , with a tiny sip of water, just enough to get them down. 3. Hold the following medication(s) as instructed, if you do not stop the medications you are instructed to stop, your procedure may be cancelled:     1. Metformin      4. If undergoing a procedure requiring anesthesia: you will need someone to drive you as you will not be able to drive home. If no anesthesia is used , it is advised you still have someone to drive you home. 1 week ekg in office 10/16/23 10:20 am.    If you have any further questions, I can be reached at [phone] ext [phone].   Emilia Cook, CLARISSA/EP Coordinator

## 2023-09-12 NOTE — PROGRESS NOTES
EKG preformed today as per Dr Karen Vyas, for atrial flutter.      Pt states He/She feels ok    EKG done, discussed with Nurse shetty    Electronically signed by Efren Jones MA on 9/12/2023 at 3:38 PM

## 2023-09-20 ENCOUNTER — APPOINTMENT (OUTPATIENT)
Dept: GENERAL RADIOLOGY | Age: 73
DRG: 057 | End: 2023-09-20
Payer: MEDICARE

## 2023-09-20 ENCOUNTER — HOSPITAL ENCOUNTER (INPATIENT)
Age: 73
LOS: 3 days | Discharge: HOME | DRG: 057 | End: 2023-09-23
Attending: EMERGENCY MEDICINE | Admitting: INTERNAL MEDICINE
Payer: MEDICARE

## 2023-09-20 DIAGNOSIS — R65.10 SIRS (SYSTEMIC INFLAMMATORY RESPONSE SYNDROME) (HCC): Primary | ICD-10-CM

## 2023-09-20 DIAGNOSIS — I95.1 ORTHOSTATIC HYPOTENSION: ICD-10-CM

## 2023-09-20 DIAGNOSIS — R07.9 CHEST PAIN, UNSPECIFIED TYPE: ICD-10-CM

## 2023-09-20 LAB
ALBUMIN SERPL-MCNC: 4.5 G/DL (ref 3.5–5.2)
ALP SERPL-CCNC: 73 U/L (ref 40–129)
ALT SERPL-CCNC: 28 U/L (ref 0–40)
ANION GAP SERPL CALCULATED.3IONS-SCNC: 10 MMOL/L (ref 7–16)
AST SERPL-CCNC: 24 U/L (ref 0–39)
B PARAP IS1001 DNA NPH QL NAA+NON-PROBE: NOT DETECTED
B PERT DNA SPEC QL NAA+PROBE: NOT DETECTED
BACTERIA URNS QL MICRO: ABNORMAL
BASOPHILS # BLD: 0.02 K/UL (ref 0–0.2)
BASOPHILS NFR BLD: 0 % (ref 0–2)
BILIRUB SERPL-MCNC: 1.2 MG/DL (ref 0–1.2)
BILIRUB UR QL STRIP: NEGATIVE
BUN SERPL-MCNC: 24 MG/DL (ref 6–23)
C PNEUM DNA NPH QL NAA+NON-PROBE: NOT DETECTED
CALCIUM SERPL-MCNC: 10 MG/DL (ref 8.6–10.2)
CHLORIDE SERPL-SCNC: 100 MMOL/L (ref 98–107)
CLARITY UR: ABNORMAL
CO2 SERPL-SCNC: 26 MMOL/L (ref 22–29)
COLOR UR: YELLOW
CREAT SERPL-MCNC: 1 MG/DL (ref 0.7–1.2)
EKG ATRIAL RATE: 241 BPM
EKG Q-T INTERVAL: 410 MS
EKG QRS DURATION: 144 MS
EKG QTC CALCULATION (BAZETT): 509 MS
EKG R AXIS: 2 DEGREES
EKG T AXIS: 9 DEGREES
EKG VENTRICULAR RATE: 93 BPM
EOSINOPHIL # BLD: 0.03 K/UL (ref 0.05–0.5)
EOSINOPHILS RELATIVE PERCENT: 0 % (ref 0–6)
ERYTHROCYTE [DISTWIDTH] IN BLOOD BY AUTOMATED COUNT: 17 % (ref 11.5–15)
FLUAV RNA NPH QL NAA+NON-PROBE: NOT DETECTED
FLUBV RNA NPH QL NAA+NON-PROBE: NOT DETECTED
GFR SERPL CREATININE-BSD FRML MDRD: >60 ML/MIN/1.73M2
GLUCOSE SERPL-MCNC: 121 MG/DL (ref 74–99)
GLUCOSE UR STRIP-MCNC: NEGATIVE MG/DL
HADV DNA NPH QL NAA+NON-PROBE: NOT DETECTED
HCOV 229E RNA NPH QL NAA+NON-PROBE: NOT DETECTED
HCOV HKU1 RNA NPH QL NAA+NON-PROBE: NOT DETECTED
HCOV NL63 RNA NPH QL NAA+NON-PROBE: NOT DETECTED
HCOV OC43 RNA NPH QL NAA+NON-PROBE: NOT DETECTED
HCT VFR BLD AUTO: 43 % (ref 37–54)
HGB BLD-MCNC: 13.8 G/DL (ref 12.5–16.5)
HGB UR QL STRIP.AUTO: ABNORMAL
HMPV RNA NPH QL NAA+NON-PROBE: NOT DETECTED
HPIV1 RNA NPH QL NAA+NON-PROBE: NOT DETECTED
HPIV2 RNA NPH QL NAA+NON-PROBE: NOT DETECTED
HPIV3 RNA NPH QL NAA+NON-PROBE: NOT DETECTED
HPIV4 RNA NPH QL NAA+NON-PROBE: NOT DETECTED
IMM GRANULOCYTES # BLD AUTO: 0.05 K/UL (ref 0–0.58)
IMM GRANULOCYTES NFR BLD: 1 % (ref 0–5)
KETONES UR STRIP-MCNC: 15 MG/DL
LACTATE BLDV-SCNC: 1.4 MMOL/L (ref 0.5–1.9)
LACTATE BLDV-SCNC: 1.7 MMOL/L (ref 0.5–1.9)
LEUKOCYTE ESTERASE UR QL STRIP: ABNORMAL
LYMPHOCYTES NFR BLD: 0.76 K/UL (ref 1.5–4)
LYMPHOCYTES RELATIVE PERCENT: 7 % (ref 20–42)
M PNEUMO DNA NPH QL NAA+NON-PROBE: NOT DETECTED
MAGNESIUM SERPL-MCNC: 1.9 MG/DL (ref 1.6–2.6)
MCH RBC QN AUTO: 28.2 PG (ref 26–35)
MCHC RBC AUTO-ENTMCNC: 32.1 G/DL (ref 32–34.5)
MCV RBC AUTO: 87.9 FL (ref 80–99.9)
MONOCYTES NFR BLD: 1.18 K/UL (ref 0.1–0.95)
MONOCYTES NFR BLD: 11 % (ref 2–12)
NEUTROPHILS NFR BLD: 80 % (ref 43–80)
NEUTS SEG NFR BLD: 8.44 K/UL (ref 1.8–7.3)
NITRITE UR QL STRIP: NEGATIVE
PH UR STRIP: 5.5 [PH] (ref 5–9)
PLATELET # BLD AUTO: 151 K/UL (ref 130–450)
PMV BLD AUTO: 10.7 FL (ref 7–12)
POTASSIUM SERPL-SCNC: 4.9 MMOL/L (ref 3.5–5)
PROT SERPL-MCNC: 7.6 G/DL (ref 6.4–8.3)
PROT UR STRIP-MCNC: 100 MG/DL
RBC # BLD AUTO: 4.89 M/UL (ref 3.8–5.8)
RBC #/AREA URNS HPF: ABNORMAL /HPF
RSV RNA NPH QL NAA+NON-PROBE: NOT DETECTED
RV+EV RNA NPH QL NAA+NON-PROBE: NOT DETECTED
SARS-COV-2 RNA NPH QL NAA+NON-PROBE: NOT DETECTED
SODIUM SERPL-SCNC: 136 MMOL/L (ref 132–146)
SP GR UR STRIP: 1.02 (ref 1–1.03)
SPECIMEN DESCRIPTION: NORMAL
TROPONIN I SERPL HS-MCNC: 10 NG/L (ref 0–11)
TROPONIN I SERPL HS-MCNC: 10 NG/L (ref 0–11)
TSH SERPL DL<=0.05 MIU/L-ACNC: 1.47 UIU/ML (ref 0.27–4.2)
UROBILINOGEN UR STRIP-ACNC: 0.2 EU/DL (ref 0–1)
WBC #/AREA URNS HPF: ABNORMAL /HPF
WBC OTHER # BLD: 10.5 K/UL (ref 4.5–11.5)

## 2023-09-20 PROCEDURE — 83735 ASSAY OF MAGNESIUM: CPT

## 2023-09-20 PROCEDURE — 6360000002 HC RX W HCPCS

## 2023-09-20 PROCEDURE — 93005 ELECTROCARDIOGRAM TRACING: CPT

## 2023-09-20 PROCEDURE — 83605 ASSAY OF LACTIC ACID: CPT

## 2023-09-20 PROCEDURE — 96361 HYDRATE IV INFUSION ADD-ON: CPT

## 2023-09-20 PROCEDURE — 2060000000 HC ICU INTERMEDIATE R&B

## 2023-09-20 PROCEDURE — 81001 URINALYSIS AUTO W/SCOPE: CPT

## 2023-09-20 PROCEDURE — 84484 ASSAY OF TROPONIN QUANT: CPT

## 2023-09-20 PROCEDURE — 93010 ELECTROCARDIOGRAM REPORT: CPT | Performed by: INTERNAL MEDICINE

## 2023-09-20 PROCEDURE — 2580000003 HC RX 258: Performed by: FAMILY MEDICINE

## 2023-09-20 PROCEDURE — 0202U NFCT DS 22 TRGT SARS-COV-2: CPT

## 2023-09-20 PROCEDURE — 96375 TX/PRO/DX INJ NEW DRUG ADDON: CPT

## 2023-09-20 PROCEDURE — G0378 HOSPITAL OBSERVATION PER HR: HCPCS

## 2023-09-20 PROCEDURE — 6370000000 HC RX 637 (ALT 250 FOR IP)

## 2023-09-20 PROCEDURE — 99285 EMERGENCY DEPT VISIT HI MDM: CPT

## 2023-09-20 PROCEDURE — 6370000000 HC RX 637 (ALT 250 FOR IP): Performed by: FAMILY MEDICINE

## 2023-09-20 PROCEDURE — 96360 HYDRATION IV INFUSION INIT: CPT

## 2023-09-20 PROCEDURE — 85025 COMPLETE CBC W/AUTO DIFF WBC: CPT

## 2023-09-20 PROCEDURE — 84443 ASSAY THYROID STIM HORMONE: CPT

## 2023-09-20 PROCEDURE — 71045 X-RAY EXAM CHEST 1 VIEW: CPT

## 2023-09-20 PROCEDURE — 87040 BLOOD CULTURE FOR BACTERIA: CPT

## 2023-09-20 PROCEDURE — 80053 COMPREHEN METABOLIC PANEL: CPT

## 2023-09-20 PROCEDURE — 2580000003 HC RX 258

## 2023-09-20 RX ORDER — SODIUM CHLORIDE 0.9 % (FLUSH) 0.9 %
10 SYRINGE (ML) INJECTION PRN
Status: DISCONTINUED | OUTPATIENT
Start: 2023-09-20 | End: 2023-09-23 | Stop reason: HOSPADM

## 2023-09-20 RX ORDER — ACETAMINOPHEN 325 MG/1
650 TABLET ORAL ONCE
Status: COMPLETED | OUTPATIENT
Start: 2023-09-20 | End: 2023-09-20

## 2023-09-20 RX ORDER — SODIUM CHLORIDE 9 MG/ML
INJECTION, SOLUTION INTRAVENOUS PRN
Status: DISCONTINUED | OUTPATIENT
Start: 2023-09-20 | End: 2023-09-23 | Stop reason: HOSPADM

## 2023-09-20 RX ORDER — ONDANSETRON 2 MG/ML
4 INJECTION INTRAMUSCULAR; INTRAVENOUS EVERY 6 HOURS PRN
Status: DISCONTINUED | OUTPATIENT
Start: 2023-09-20 | End: 2023-09-23 | Stop reason: HOSPADM

## 2023-09-20 RX ORDER — 0.9 % SODIUM CHLORIDE 0.9 %
30 INTRAVENOUS SOLUTION INTRAVENOUS ONCE
Status: COMPLETED | OUTPATIENT
Start: 2023-09-20 | End: 2023-09-20

## 2023-09-20 RX ORDER — POLYETHYLENE GLYCOL 3350 17 G/17G
17 POWDER, FOR SOLUTION ORAL DAILY PRN
Status: DISCONTINUED | OUTPATIENT
Start: 2023-09-20 | End: 2023-09-23 | Stop reason: HOSPADM

## 2023-09-20 RX ORDER — AMIODARONE HYDROCHLORIDE 200 MG/1
200 TABLET ORAL DAILY
Status: DISCONTINUED | OUTPATIENT
Start: 2023-09-20 | End: 2023-09-23 | Stop reason: HOSPADM

## 2023-09-20 RX ORDER — KETOROLAC TROMETHAMINE 30 MG/ML
15 INJECTION, SOLUTION INTRAMUSCULAR; INTRAVENOUS ONCE
Status: COMPLETED | OUTPATIENT
Start: 2023-09-20 | End: 2023-09-20

## 2023-09-20 RX ORDER — FLUTICASONE PROPIONATE 50 MCG
1 SPRAY, SUSPENSION (ML) NASAL NIGHTLY
Status: DISCONTINUED | OUTPATIENT
Start: 2023-09-20 | End: 2023-09-23 | Stop reason: HOSPADM

## 2023-09-20 RX ORDER — ONDANSETRON 4 MG/1
4 TABLET, ORALLY DISINTEGRATING ORAL EVERY 8 HOURS PRN
Status: DISCONTINUED | OUTPATIENT
Start: 2023-09-20 | End: 2023-09-23 | Stop reason: HOSPADM

## 2023-09-20 RX ORDER — METOPROLOL SUCCINATE 50 MG/1
50 TABLET, EXTENDED RELEASE ORAL 2 TIMES DAILY
Status: DISCONTINUED | OUTPATIENT
Start: 2023-09-20 | End: 2023-09-23 | Stop reason: HOSPADM

## 2023-09-20 RX ORDER — SODIUM CHLORIDE 9 MG/ML
INJECTION, SOLUTION INTRAVENOUS CONTINUOUS
Status: DISCONTINUED | OUTPATIENT
Start: 2023-09-20 | End: 2023-09-23 | Stop reason: HOSPADM

## 2023-09-20 RX ORDER — SODIUM CHLORIDE 0.9 % (FLUSH) 0.9 %
5-40 SYRINGE (ML) INJECTION EVERY 12 HOURS SCHEDULED
Status: DISCONTINUED | OUTPATIENT
Start: 2023-09-20 | End: 2023-09-23 | Stop reason: HOSPADM

## 2023-09-20 RX ORDER — PANTOPRAZOLE SODIUM 40 MG/1
40 TABLET, DELAYED RELEASE ORAL
Status: DISCONTINUED | OUTPATIENT
Start: 2023-09-21 | End: 2023-09-23 | Stop reason: HOSPADM

## 2023-09-20 RX ORDER — ACETAMINOPHEN 650 MG/1
650 SUPPOSITORY RECTAL EVERY 6 HOURS PRN
Status: DISCONTINUED | OUTPATIENT
Start: 2023-09-20 | End: 2023-09-23 | Stop reason: HOSPADM

## 2023-09-20 RX ORDER — AMLODIPINE BESYLATE 10 MG/1
10 TABLET ORAL DAILY
Status: DISCONTINUED | OUTPATIENT
Start: 2023-09-20 | End: 2023-09-23 | Stop reason: HOSPADM

## 2023-09-20 RX ORDER — ACETAMINOPHEN 325 MG/1
650 TABLET ORAL EVERY 6 HOURS PRN
Status: DISCONTINUED | OUTPATIENT
Start: 2023-09-20 | End: 2023-09-23 | Stop reason: HOSPADM

## 2023-09-20 RX ADMIN — SODIUM CHLORIDE 2925 ML: 9 INJECTION, SOLUTION INTRAVENOUS at 12:14

## 2023-09-20 RX ADMIN — ACETAMINOPHEN 650 MG: 325 TABLET ORAL at 12:42

## 2023-09-20 RX ADMIN — SODIUM CHLORIDE: 9 INJECTION, SOLUTION INTRAVENOUS at 19:42

## 2023-09-20 RX ADMIN — AMIODARONE HYDROCHLORIDE 200 MG: 200 TABLET ORAL at 19:38

## 2023-09-20 RX ADMIN — WATER 2000 MG: 1 INJECTION INTRAMUSCULAR; INTRAVENOUS; SUBCUTANEOUS at 16:10

## 2023-09-20 RX ADMIN — KETOROLAC TROMETHAMINE 15 MG: 30 INJECTION, SOLUTION INTRAMUSCULAR; INTRAVENOUS at 19:39

## 2023-09-20 RX ADMIN — AMLODIPINE BESYLATE 10 MG: 10 TABLET ORAL at 19:38

## 2023-09-20 RX ADMIN — ACETAMINOPHEN 650 MG: 325 TABLET ORAL at 17:46

## 2023-09-20 ASSESSMENT — PATIENT HEALTH QUESTIONNAIRE - PHQ9
SUM OF ALL RESPONSES TO PHQ QUESTIONS 1-9: 0
SUM OF ALL RESPONSES TO PHQ9 QUESTIONS 1 & 2: 0
SUM OF ALL RESPONSES TO PHQ QUESTIONS 1-9: 0
2. FEELING DOWN, DEPRESSED OR HOPELESS: 0
1. LITTLE INTEREST OR PLEASURE IN DOING THINGS: 0
SUM OF ALL RESPONSES TO PHQ QUESTIONS 1-9: 0
SUM OF ALL RESPONSES TO PHQ QUESTIONS 1-9: 0

## 2023-09-20 ASSESSMENT — LIFESTYLE VARIABLES
HOW MANY STANDARD DRINKS CONTAINING ALCOHOL DO YOU HAVE ON A TYPICAL DAY: PATIENT DOES NOT DRINK
HOW MANY STANDARD DRINKS CONTAINING ALCOHOL DO YOU HAVE ON A TYPICAL DAY: PATIENT DOES NOT DRINK
HOW OFTEN DO YOU HAVE A DRINK CONTAINING ALCOHOL: NEVER
HOW OFTEN DO YOU HAVE A DRINK CONTAINING ALCOHOL: NEVER

## 2023-09-20 ASSESSMENT — PAIN - FUNCTIONAL ASSESSMENT: PAIN_FUNCTIONAL_ASSESSMENT: NONE - DENIES PAIN

## 2023-09-20 NOTE — ED PROVIDER NOTES
Department of Emergency Medicine     Written by: Diandra Bradford MD  Patient Name: Daniel Wills Date: 2023  9:45 AM  MRN: [id number]                   : 1950      HPI  Chief Complaint   Patient presents with    Dizziness     LIGHTHEADEDNESS Starting yesterday     Tremors     Bodywide for a day      Chest Pain     Yesterday, supposed to have cardioversion on oct 9        Chayito Chu is a 74 y.o. male that presents to the ED with complaints of lightheadedness, rigors and chest pain. The patient says that last week he started having \"tremors\" in his right upper extremity. It was localized to that. He says that since last night, the patient started developing midsternal chest pain. He also developed generalized rigors. He apparently did not have a fever at home. He says that he feels lightheaded, every time he stands up, he feels like he is going to tip over. No dizziness and no room spinning. No diaphoresis. Patient recently had ablation for A-fib in July. Recently started Eliquis, Toprol and amiodarone. Review of systems:    Pertinent positives and negatives mentioned in the HPI/MDM.    ------------------------- PAST HISTORY -------------------------    I personally reviewed the following history:    Past Medical History:  has a past medical history of Arthritis, Atrial flutter ([address]), Cancer ([address]), Diabetes mellitus ([address]), History of blood transfusion, Hyperlipidemia, Hypertension, Neoplasm of duodenum, DALTON (obstructive sleep apnea), RBBB, and Thyroid disease. Past Surgical History:  has a past surgical history that includes Tonsillectomy (); Neck surgery (); Hand surgery (); knee surgery (); cardiovascular stress test; Colonoscopy (); Upper gastrointestinal endoscopy (N/A, 2023); Upper gastrointestinal endoscopy (N/A, 2023);  Colonoscopy (N/A, 2023); skin biopsy; ablation of dysrhythmic focus (2023); and Cardioversion

## 2023-09-21 PROBLEM — R65.10 SIRS (SYSTEMIC INFLAMMATORY RESPONSE SYNDROME) (HCC): Status: ACTIVE | Noted: 2023-09-21

## 2023-09-21 LAB
ALBUMIN SERPL-MCNC: 4 G/DL (ref 3.5–5.2)
ALP SERPL-CCNC: 65 U/L (ref 40–129)
ALT SERPL-CCNC: 22 U/L (ref 0–40)
ANION GAP SERPL CALCULATED.3IONS-SCNC: 11 MMOL/L (ref 7–16)
AST SERPL-CCNC: 23 U/L (ref 0–39)
BASOPHILS # BLD: 0.02 K/UL (ref 0–0.2)
BASOPHILS NFR BLD: 0 % (ref 0–2)
BILIRUB SERPL-MCNC: 1 MG/DL (ref 0–1.2)
BUN SERPL-MCNC: 25 MG/DL (ref 6–23)
CALCIUM SERPL-MCNC: 9.1 MG/DL (ref 8.6–10.2)
CHLORIDE SERPL-SCNC: 99 MMOL/L (ref 98–107)
CO2 SERPL-SCNC: 23 MMOL/L (ref 22–29)
CREAT SERPL-MCNC: 0.9 MG/DL (ref 0.7–1.2)
EKG ATRIAL RATE: 241 BPM
EKG P AXIS: 97 DEGREES
EKG Q-T INTERVAL: 426 MS
EKG QRS DURATION: 146 MS
EKG QTC CALCULATION (BAZETT): 521 MS
EKG R AXIS: -10 DEGREES
EKG T AXIS: 1 DEGREES
EKG VENTRICULAR RATE: 90 BPM
EOSINOPHIL # BLD: 0.02 K/UL (ref 0.05–0.5)
EOSINOPHILS RELATIVE PERCENT: 0 % (ref 0–6)
ERYTHROCYTE [DISTWIDTH] IN BLOOD BY AUTOMATED COUNT: 16.9 % (ref 11.5–15)
GFR SERPL CREATININE-BSD FRML MDRD: >60 ML/MIN/1.73M2
GLUCOSE BLD-MCNC: 105 MG/DL (ref 74–99)
GLUCOSE BLD-MCNC: 110 MG/DL (ref 74–99)
GLUCOSE BLD-MCNC: 111 MG/DL (ref 74–99)
GLUCOSE BLD-MCNC: 95 MG/DL (ref 74–99)
GLUCOSE SERPL-MCNC: 114 MG/DL (ref 74–99)
HBA1C MFR BLD: 6.3 % (ref 4–5.6)
HCT VFR BLD AUTO: 39.1 % (ref 37–54)
HGB BLD-MCNC: 12.9 G/DL (ref 12.5–16.5)
IMM GRANULOCYTES # BLD AUTO: 0.04 K/UL (ref 0–0.58)
IMM GRANULOCYTES NFR BLD: 0 % (ref 0–5)
LYMPHOCYTES NFR BLD: 0.67 K/UL (ref 1.5–4)
LYMPHOCYTES RELATIVE PERCENT: 6 % (ref 20–42)
MAGNESIUM SERPL-MCNC: 1.8 MG/DL (ref 1.6–2.6)
MCH RBC QN AUTO: 28.5 PG (ref 26–35)
MCHC RBC AUTO-ENTMCNC: 33 G/DL (ref 32–34.5)
MCV RBC AUTO: 86.3 FL (ref 80–99.9)
MONOCYTES NFR BLD: 1.28 K/UL (ref 0.1–0.95)
MONOCYTES NFR BLD: 11 % (ref 2–12)
NEUTROPHILS NFR BLD: 82 % (ref 43–80)
NEUTS SEG NFR BLD: 9.37 K/UL (ref 1.8–7.3)
PLATELET # BLD AUTO: 129 K/UL (ref 130–450)
PMV BLD AUTO: 10.8 FL (ref 7–12)
POTASSIUM SERPL-SCNC: 4.4 MMOL/L (ref 3.5–5)
PROT SERPL-MCNC: 7.2 G/DL (ref 6.4–8.3)
RBC # BLD AUTO: 4.53 M/UL (ref 3.8–5.8)
SODIUM SERPL-SCNC: 133 MMOL/L (ref 132–146)
WBC OTHER # BLD: 11.4 K/UL (ref 4.5–11.5)

## 2023-09-21 PROCEDURE — 83735 ASSAY OF MAGNESIUM: CPT

## 2023-09-21 PROCEDURE — 97161 PT EVAL LOW COMPLEX 20 MIN: CPT

## 2023-09-21 PROCEDURE — 85025 COMPLETE CBC W/AUTO DIFF WBC: CPT

## 2023-09-21 PROCEDURE — 2060000000 HC ICU INTERMEDIATE R&B

## 2023-09-21 PROCEDURE — APPSS60 APP SPLIT SHARED TIME 46-60 MINUTES

## 2023-09-21 PROCEDURE — 6370000000 HC RX 637 (ALT 250 FOR IP): Performed by: FAMILY MEDICINE

## 2023-09-21 PROCEDURE — 36415 COLL VENOUS BLD VENIPUNCTURE: CPT

## 2023-09-21 PROCEDURE — 2580000003 HC RX 258: Performed by: FAMILY MEDICINE

## 2023-09-21 PROCEDURE — 96366 THER/PROPH/DIAG IV INF ADDON: CPT

## 2023-09-21 PROCEDURE — 6370000000 HC RX 637 (ALT 250 FOR IP): Performed by: INTERNAL MEDICINE

## 2023-09-21 PROCEDURE — 97165 OT EVAL LOW COMPLEX 30 MIN: CPT

## 2023-09-21 PROCEDURE — G0378 HOSPITAL OBSERVATION PER HR: HCPCS

## 2023-09-21 PROCEDURE — 93010 ELECTROCARDIOGRAM REPORT: CPT | Performed by: INTERNAL MEDICINE

## 2023-09-21 PROCEDURE — 96365 THER/PROPH/DIAG IV INF INIT: CPT

## 2023-09-21 PROCEDURE — 82962 GLUCOSE BLOOD TEST: CPT

## 2023-09-21 PROCEDURE — 80053 COMPREHEN METABOLIC PANEL: CPT

## 2023-09-21 PROCEDURE — 2580000003 HC RX 258: Performed by: INTERNAL MEDICINE

## 2023-09-21 PROCEDURE — 83036 HEMOGLOBIN GLYCOSYLATED A1C: CPT

## 2023-09-21 PROCEDURE — 99222 1ST HOSP IP/OBS MODERATE 55: CPT | Performed by: INTERNAL MEDICINE

## 2023-09-21 PROCEDURE — 97530 THERAPEUTIC ACTIVITIES: CPT

## 2023-09-21 PROCEDURE — 6360000002 HC RX W HCPCS: Performed by: INTERNAL MEDICINE

## 2023-09-21 PROCEDURE — 97535 SELF CARE MNGMENT TRAINING: CPT

## 2023-09-21 RX ORDER — MAGNESIUM SULFATE IN WATER 40 MG/ML
2000 INJECTION, SOLUTION INTRAVENOUS ONCE
Status: COMPLETED | OUTPATIENT
Start: 2023-09-21 | End: 2023-09-21

## 2023-09-21 RX ORDER — INSULIN LISPRO 100 [IU]/ML
0-10 INJECTION, SOLUTION INTRAVENOUS; SUBCUTANEOUS
Status: DISCONTINUED | OUTPATIENT
Start: 2023-09-21 | End: 2023-09-23 | Stop reason: HOSPADM

## 2023-09-21 RX ORDER — DOCUSATE SODIUM 100 MG/1
100 CAPSULE, LIQUID FILLED ORAL NIGHTLY
Status: DISCONTINUED | OUTPATIENT
Start: 2023-09-21 | End: 2023-09-23 | Stop reason: HOSPADM

## 2023-09-21 RX ORDER — 0.9 % SODIUM CHLORIDE 0.9 %
500 INTRAVENOUS SOLUTION INTRAVENOUS ONCE
Status: COMPLETED | OUTPATIENT
Start: 2023-09-21 | End: 2023-09-21

## 2023-09-21 RX ORDER — ASPIRIN 81 MG/1
81 TABLET ORAL DAILY
Status: DISCONTINUED | OUTPATIENT
Start: 2023-09-21 | End: 2023-09-23 | Stop reason: HOSPADM

## 2023-09-21 RX ADMIN — SODIUM CHLORIDE: 9 INJECTION, SOLUTION INTRAVENOUS at 10:57

## 2023-09-21 RX ADMIN — METOPROLOL SUCCINATE 50 MG: 50 TABLET, EXTENDED RELEASE ORAL at 20:36

## 2023-09-21 RX ADMIN — APIXABAN 5 MG: 5 TABLET, FILM COATED ORAL at 20:36

## 2023-09-21 RX ADMIN — PANTOPRAZOLE SODIUM 40 MG: 40 TABLET, DELAYED RELEASE ORAL at 05:17

## 2023-09-21 RX ADMIN — DOCUSATE SODIUM 100 MG: 100 CAPSULE, LIQUID FILLED ORAL at 20:36

## 2023-09-21 RX ADMIN — APIXABAN 5 MG: 5 TABLET, FILM COATED ORAL at 11:02

## 2023-09-21 RX ADMIN — ACETAMINOPHEN 650 MG: 325 TABLET ORAL at 17:20

## 2023-09-21 RX ADMIN — ASPIRIN 81 MG: 81 TABLET, COATED ORAL at 11:14

## 2023-09-21 RX ADMIN — SODIUM CHLORIDE, PRESERVATIVE FREE 10 ML: 5 INJECTION INTRAVENOUS at 11:03

## 2023-09-21 RX ADMIN — SODIUM CHLORIDE 500 ML: 9 INJECTION, SOLUTION INTRAVENOUS at 18:41

## 2023-09-21 RX ADMIN — APIXABAN 5 MG: 5 TABLET, FILM COATED ORAL at 00:17

## 2023-09-21 RX ADMIN — AMLODIPINE BESYLATE 10 MG: 10 TABLET ORAL at 11:02

## 2023-09-21 RX ADMIN — METOPROLOL SUCCINATE 50 MG: 50 TABLET, EXTENDED RELEASE ORAL at 11:02

## 2023-09-21 RX ADMIN — AMIODARONE HYDROCHLORIDE 200 MG: 200 TABLET ORAL at 11:02

## 2023-09-21 RX ADMIN — MAGNESIUM SULFATE HEPTAHYDRATE 2000 MG: 40 INJECTION, SOLUTION INTRAVENOUS at 11:01

## 2023-09-21 ASSESSMENT — PAIN SCALES - GENERAL
PAINLEVEL_OUTOF10: 6
PAINLEVEL_OUTOF10: 0

## 2023-09-21 ASSESSMENT — PAIN DESCRIPTION - LOCATION: LOCATION: HEAD

## 2023-09-21 ASSESSMENT — PAIN DESCRIPTION - DESCRIPTORS: DESCRIPTORS: ACHING;DISCOMFORT;SORE

## 2023-09-21 NOTE — PROGRESS NOTES
Call received back from Dr. Declan Herrera. Order obtained for 500cc NS bolus for orthostatic BP's. Order obtained for neurology consult to rule out parkinson's. Perfect serve message sent to Dr. Indy Herman to notify of new consult request. Message read.

## 2023-09-21 NOTE — PROGRESS NOTES
Physical Therapy  Physical Therapy Initial Assessment     Name: Krista Graber  : 1950  MRN: [id number]      Date of Service: 2023    Evaluating PT:  Harpal Gilmore PT, DPT, [id number]    Room #:  7136/1616-K  Diagnosis:  Orthostatic hypotension [I95.1]  Chest pain [R07.9]  SIRS (systemic inflammatory response syndrome) (HCC) [R65.10]  Chest pain, unspecified type [R07.9]  PMHx/PSHx:    Past Medical History:   Diagnosis Date    Arthritis     Atrial flutter ([address])     a fib    Cancer ([address])     skin    Diabetes mellitus ([address])     prediabetic    History of blood transfusion     Hyperlipidemia     Hypertension     Neoplasm of duodenum     GI Bleed    DALTON (obstructive sleep apnea)     no cpap    RBBB     Thyroid disease       Past Surgical History:   Procedure Laterality Date    ABLATION OF DYSRHYTHMIC FOCUS  2023    Dr. Tamir Potts  2023    Successful  Dr. Sulma Caraballo      COLONOSCOPY N/A 2023    COLONOSCOPY DIAGNOSTIC performed by Erich Bajwa MD at [address] [facility] ENDOSCOPY N/A 2023    EGD BIOPSY performed by Erich Bajwa MD at [address] [place] [address] N/A 2023    EGD POLYP SNARE performed by Erich Bajwa MD at [facility] ENDOSCOPY      Procedure/Surgery:  none this admission  Precautions:  Fall Risk, monitor BP  Equipment Needs:  TBD    SUBJECTIVE:    Pt lives with his wife in a 2 story home with 1 stairs to enter and 0 rail. Bed is on 2 floor and bath is on 2 floor with 1 flight and 1 rail to access. PT uses the shower in the basement with 1 flight and 1 rail to access. Pt ambulated with no AD PTA. OBJECTIVE:   Initial Evaluation  Date: 23 Treatment Short Term/ Long Term   Goals   AM-PAC 6 Clicks 50/43     Was pt agreeable to Eval/treatment? Completed sit<>stand transfer with no hands on assist. No dizziness or lightheadedness noted with positional changes. Ambulated to bathroom and completed transfer <>commode with cues for hand placement. Ambulated increased distance in hallway with no AD with slowed pace and mildly unsteady gait. No LOB noted. Pt was returned to room and seated EOB. All needs were met and call light in reach at conclusion of session. RN notified. Treatment:  Patient practiced and was instructed in the following treatment:    Bed mobility training - pt given verbal and tactile cues to facilitate proper sequencing and safety during rolling and supine>sit as well as provided with physical assistance to complete task   Sitting EOB for >8 minutes for upright tolerance, postural awareness and BLE ROM  Transfer training - pt was given verbal and tactile cues to facilitate proper hand placement, technique and safety during sit to stand and stand to sit as well as provided with physical assistance. Gait training- pt was given verbal and tactile cues to facilitate safety during ambulation as well as provided with physical assistance. Skilled monitoring of vitals throughout session. Pt's/ family goals   1. To get better    Prognosis is good for reaching above PT goals. Patient and or family understand(s) diagnosis, prognosis, and plan of care.   yes    PHYSICAL THERAPY PLAN OF CARE:    PT POC is established based on physician order and patient diagnosis     Referring provider/PT Order:    09/20/23 1915  PT evaluation and treat  Start:  09/20/23 1915,   End:  09/20/23 1915,   ONE TIME,   Standing Count:  1 Occurrences,   R         ANIYAH Gaona - CNP     Diagnosis:  Orthostatic hypotension [I95.1]  Chest pain [R07.9]  SIRS (systemic inflammatory response syndrome) (HCC) [R65.10]  Chest pain, unspecified type [R07.9]  Specific instructions for next treatment:  Maximize safe and independent functional mobility     Current

## 2023-09-21 NOTE — CONSULTS
Inpatient Cardiology Consultation      Reason for Consult: Chest pain    Consulting Physician: Dr. Elgin Kwong    Requesting Physician:  ANIYAH Rhodes - CNP    Date of Consultation: 9/21/2023    HISTORY OF PRESENT ILLNESS:   Patient is a 75-year-old male who is known to [facility] cardiology for Dr. Deshawn Jacobo and also follows closely with electrophysiology. Patient was last seen inpatient by general cardiology 5/20/2020 for A-fib RVR by Dr. Daxa Monaco. HPI:  Patient presented to ER 9/20/2023 with lightheadedness, chest pain, and tremors of right upper extremity. Patient reporting tremors since last week and developing midsternal chest pain starting last evening prior to arrival.  Patient also reporting lightheadedness with standing. Patient did have mild fever of 100.5 upon arrival and was found to be back in atrial fibrillation. Temperature improved with normal heart rate also improved after 30 cc/KG fluid. Patient had positive orthostatic vitals with lightheadedness upon standing. Patient was admitted for SIRS, orthostatic hypotension, and chest pain. Patient covered with 1 dose Rocephin in ER. VS upon arrival 100.5, 18 respirations, 115 pulse, 138/76, 96% room air. Labs: Potassium 4.4, BUN 25, creatinine 0.9, magnesium 1.9, lactate 1.7 > 1.4, glucose 114, serum calcium 10, troponin 10 > 10, albumin 4.0, TSH 1.47, WBC 11.4, H&H 12/39, platelet 730 > 187, viral panel negative, blood cultures pending, UA trace leukocytes 15 ketones 100 protein with 21-50 red cells  CXR: No acute process. Borderline cardiomegaly. Upon assessment today 9/21/2023 patient is having orthostatic vital signs done. Patient is asymptomatic during vital signs and had systolic drop of 8 with heart rate increase of 36. Patient reports over the last several weeks he has been noticing right hand intentional tremors. He reports for 1 week he has been having intermittent rigors and cold sweats.   He reports 1 day prior to arrival to the

## 2023-09-21 NOTE — PLAN OF CARE
Problem: ABCDS Injury Assessment  Goal: Absence of physical injury  Outcome: Progressing     Problem: Pain  Goal: Verbalizes/displays adequate comfort level or baseline comfort level  Outcome: Progressing     Problem: Chronic Conditions and Co-morbidities  Goal: Patient's chronic conditions and co-morbidity symptoms are monitored and maintained or improved  Outcome: Progressing

## 2023-09-21 NOTE — PROGRESS NOTES
Called to room by patient and his wife currently visiting at bedside for patient having episode of tremors to BUE/upper body which was one the complaints which brought him into the hospital. Per patient he has had a couple similar episode today but this has been the longest one. Per patient and his wife this episode of tremors has been going on for approximately 15 minutes. Patient remains alert and conversive with no other complaints and denying chest pain at this time. Patient's wife expressing concern about the possibility of lyme disease and inquiring if patient can be tested. Call placed to Dr. Jacqueline Vaughan to notify of episode. Also notified that per cardiology note today consideration of a neurology consult was mentioned.

## 2023-09-21 NOTE — ACP (ADVANCE CARE PLANNING)
Advance Care Planning   Healthcare Decision Maker:    Primary Decision Maker: Jenny Horn - [phone]    Secondary Decision Maker: Eva Goins - [phone]    Click here to complete Healthcare Decision Makers including selection of the Healthcare Decision Maker Relationship (ie \"Primary\").

## 2023-09-21 NOTE — PLAN OF CARE
Problem: Discharge Planning  Goal: Discharge to home or other facility with appropriate resources  Outcome: Progressing  Flowsheets (Taken 9/20/2023 1656)  Discharge to home or other facility with appropriate resources: Identify barriers to discharge with patient and caregiver     Problem: Safety - Adult  Goal: Free from fall injury  Outcome: Progressing     Problem: ABCDS Injury Assessment  Goal: Absence of physical injury  Outcome: Progressing     Problem: Pain  Goal: Verbalizes/displays adequate comfort level or baseline comfort level  Outcome: Progressing

## 2023-09-21 NOTE — PROGRESS NOTES
[address]        Date:2023                                                  Patient Name: Jennifer Bearden    MRN: [id number]    : 1950    Room: [address] [place] [address]          Evaluating OT: Trevon Alegre OTR/L; [id number]       Referring Provider: ANIYAH Branch CNP    Specific Provider Orders/Date: OT Eval and Treat 23       Diagnosis: Chest pain; Orthostatic hypotension     Surgery: None this admission     Pertinent Medical History:  has a past medical history of Arthritis, Atrial flutter ([address]), Cancer ([address]), Diabetes mellitus ([address]), History of blood transfusion, Hyperlipidemia, Hypertension, Neoplasm of duodenum, DALTON (obstructive sleep apnea), RBBB, and Thyroid disease.      Recommended Adaptive Equipment: TBD     Precautions:  Fall Risk, monitor BP     Assessment of current deficits    [x] Functional mobility  [x]ADLs  [x] Strength               []Cognition    [x] Functional transfers   [x] IADLs         [x] Safety Awareness   [x]Endurance    [] Fine Coordination              [x] Balance      [] Vision/perception   []Sensation     []Gross Motor Coordination  [] ROM  [] Delirium                   [] Motor Control     OT PLAN OF CARE   OT POC based on physician orders, patient diagnosis and results of clinical assessment    Frequency/Duration 1-3 days/wk for 2 weeks PRN   Specific OT Treatment Interventions to include:   * Instruction/training on adapted ADL techniques and AE recommendations to increase functional independence within precautions       * Training on energy conservation strategies, correct breathing pattern and techniques to improve independence/tolerance for self-care routine  * Functional transfer/mobility training/DME recommendations for increased independence, safety, and fall prevention  * Patient/Family education to increase follow prevention strategies. Pt able to sit EOB ~8 mins to increase core strength/balance/activity tolerance for ease with ADLs. Pt educated on activity modifications/adaptations to promote implementation of fall prevention strategies, EC/WS strategies, & independence throughout ADLs. Pt required skilled monitoring of vitals during session to maximize safety/participation. Pt required rest breaks during session and educated on pursed lip breathing. Pt appeared to have tolerated session well and appears motivated/cooperative/pleasant. Pt instructed on use of call light for assistance and fall prevention. Pt demo'ing fair understanding of education provided. Continue to educate. Rehab Potential: Good for established goals     LTG: maximize independence with ADLs to return to PLOF    Patient and/or family were instructed on functional diagnosis, prognosis/goals and OT plan of care. Demonstrated fair+ understanding. Eval Complexity: Low  History: Expanded chart review of medical records and additional review of physical, cognitive, or psychosocial history related to current functional performance  Exam: 3+ performance deficits  Assistance/Modification: Min/mod assistance or modifications required to perform tasks. May have comorbidities that affect occupational performance. Evaluation time includes thorough review of current medical information, gathering information on past medical & social history & PLOF, completion of standardized testing, informal observation of tasks, consultation with other medical professions/disciplines, assessment of data & development of POC/goals.      Time In: 10:25a  Time Out: 10:48a  Total Treatment Time: 8 minutes    Min Units   OT Eval Low 88079  x     OT Eval Medium 02810      OT Eval High 36762      OT Re-Eval [id number]       Therapeutic Ex 56048       Therapeutic Activities 03459       ADL/Self Care 40861  8 1    Orthotic Management 98463       Manual 84069     Neuro Re-Ed 34035 Non-Billable Time          Evaluation Time additionally includes thorough review of current medical information, gathering information on past medical history/social history and prior level of function, interpretation of standardized testing/informal observation of tasks, assessment of data and development of plan of care and goals.             Doroteo Newton OTR/L; [id number]

## 2023-09-21 NOTE — CARE COORDINATION
Transition of Care: Met with pt at bedside to discuss transition of care. He lives with his wife in a 2 story home with 1 step to enter. Bed on 2nd floor and bath in basement with walk in shower. Independent with ADL's. No DME. Active . PCP Dr.Michael Cardoso. Keena Colindres is planning to return home at discharge with no anticipated needs. He is ambulating in the room, no assistive device. Pt admitted for orthostatic hypotension. Cardiology consulted. CM will continue to follow (TF)    Maggie Mendoza  Case Management  [phone]        Case Management Assessment  Initial Evaluation    Date/Time of Evaluation: 9/21/2023 1:10 PM  Assessment Completed by: Lisandra Muñoz RN    If patient is discharged prior to next notation, then this note serves as note for discharge by case management. Patient Name: Amirah Holloway                   YOB: 1950  Diagnosis: Orthostatic hypotension [I95.1]  Chest pain [R07.9]  SIRS (systemic inflammatory response syndrome) (HCC) [R65.10]  Chest pain, unspecified type [R07.9]                   Date / Time: 9/20/2023  9:45 AM    Patient Admission Status: Inpatient   Readmission Risk (Low < 19, Mod (19-27), High > 27): Readmission Risk Score: 10.8    Current PCP: Janie Perla MD  PCP verified by CM? Yes    Chart Reviewed: Yes      History Provided by: Patient  Patient Orientation: Alert and Oriented    Patient Cognition: Alert    Hospitalization in the last 30 days (Readmission):  No    If yes, Readmission Assessment in  Navigator will be completed.     Advance Directives:      Code Status: Full Code   Patient's Primary Decision Maker is: Legal Next of Kin    Primary Decision MakerEleni [facility] - [phone]    Secondary Decision Maker: Hyun [place] [facility] - [phone]    Discharge Planning:    Patient lives with: Spouse/Significant Other Type of Home: House  Primary Care Giver: Self  Patient Support Systems include:

## 2023-09-21 NOTE — PROGRESS NOTES
4 Eyes Skin Assessment     NAME:  Amirah Holloway  YOB: 1950  MEDICAL RECORD NUMBER:  [id number]    The patient is being assessed for  Admission    I agree that at least one RN has performed a thorough Head to Toe Skin Assessment on the patient. ALL assessment sites listed below have been assessed. Areas assessed by both nurses:    Head, Face, Ears, Shoulders, Back, Chest, Arms, Elbows, Hands, Sacrum. Buttock, Coccyx, Ischium, Legs. Feet and Heels, Under Medical Devices , and Other ***        Does the Patient have a Wound? No noted wound(s)       Jose E Prevention initiated by RN: No  Wound Care Orders initiated by RN: No    Pressure Injury (Stage 3,4, Unstageable, DTI, NWPT, and Complex wounds) if present, place Wound referral order by RN under : No    New Ostomies, if present place, Ostomy referral order under : No     Nurse 1 eSignature: Electronically signed by Austen Calderon RN on 9/21/23 at 1:27 AM EDT    **SHARE this note so that the co-signing nurse can place an eSignature**    Nurse 2 eSignature: Electronically signed by Sada Banegas RN on 9/21/23 at 1:28 AM EDT 4 Eyes Skin Assessment     NAME:  [address] [place] [address] Avenue:  1950  MEDICAL RECORD NUMBER:  [id number]    The patient is being assessed for  {Reason for Assessment:24117}    I agree that at least one RN has performed a thorough Head to Toe Skin Assessment on the patient. ALL assessment sites listed below have been assessed. Areas assessed by both nurses:    {Pressure Areas Assessed:54553}        Does the Patient have a Wound?  {Action PNEXE:89786}       Jose E Prevention initiated by RN: {YES/NO:19726}  Wound Care Orders initiated by RN: {YES/NO:19726}    Pressure Injury (Stage 3,4, Unstageable, DTI, NWPT, and Complex wounds) if present, place Wound referral order by RN under : {YES/NO:19726}    New Ostomies, if present place, Ostomy referral order under : {YES/NO:04435}     Nurse 1 eSignature: {Esignature:[id number]}    **SHARE this note so that the co-signing nurse can place an eSignature**    Nurse 2 eSignature: Electronically signed by Zenaida Chavez RN on 9/21/23 at 1:31 AM EDT

## 2023-09-22 ENCOUNTER — APPOINTMENT (OUTPATIENT)
Dept: CT IMAGING | Age: 73
DRG: 057 | End: 2023-09-22
Attending: PSYCHIATRY & NEUROLOGY
Payer: MEDICARE

## 2023-09-22 LAB
ANION GAP SERPL CALCULATED.3IONS-SCNC: 13 MMOL/L (ref 7–16)
BASOPHILS # BLD: 0.03 K/UL (ref 0–0.2)
BASOPHILS NFR BLD: 0 % (ref 0–2)
BUN SERPL-MCNC: 16 MG/DL (ref 6–23)
CALCIUM SERPL-MCNC: 9 MG/DL (ref 8.6–10.2)
CHLORIDE SERPL-SCNC: 99 MMOL/L (ref 98–107)
CO2 SERPL-SCNC: 23 MMOL/L (ref 22–29)
CREAT SERPL-MCNC: 0.8 MG/DL (ref 0.7–1.2)
EOSINOPHIL # BLD: 0.14 K/UL (ref 0.05–0.5)
EOSINOPHILS RELATIVE PERCENT: 1 % (ref 0–6)
ERYTHROCYTE [DISTWIDTH] IN BLOOD BY AUTOMATED COUNT: 16.7 % (ref 11.5–15)
GFR SERPL CREATININE-BSD FRML MDRD: >60 ML/MIN/1.73M2
GLUCOSE BLD-MCNC: 105 MG/DL (ref 74–99)
GLUCOSE BLD-MCNC: 106 MG/DL (ref 74–99)
GLUCOSE BLD-MCNC: 111 MG/DL (ref 74–99)
GLUCOSE BLD-MCNC: 134 MG/DL (ref 74–99)
GLUCOSE SERPL-MCNC: 89 MG/DL (ref 74–99)
HCT VFR BLD AUTO: 39.7 % (ref 37–54)
HGB BLD-MCNC: 12.6 G/DL (ref 12.5–16.5)
IMM GRANULOCYTES # BLD AUTO: 0.04 K/UL (ref 0–0.58)
IMM GRANULOCYTES NFR BLD: 0 % (ref 0–5)
LYMPHOCYTES NFR BLD: 1 K/UL (ref 1.5–4)
LYMPHOCYTES RELATIVE PERCENT: 10 % (ref 20–42)
MCH RBC QN AUTO: 27.9 PG (ref 26–35)
MCHC RBC AUTO-ENTMCNC: 31.7 G/DL (ref 32–34.5)
MCV RBC AUTO: 87.8 FL (ref 80–99.9)
MONOCYTES NFR BLD: 1.17 K/UL (ref 0.1–0.95)
MONOCYTES NFR BLD: 12 % (ref 2–12)
NEUTROPHILS NFR BLD: 76 % (ref 43–80)
NEUTS SEG NFR BLD: 7.58 K/UL (ref 1.8–7.3)
PLATELET, FLUORESCENCE: 121 K/UL (ref 130–450)
PMV BLD AUTO: 11.9 FL (ref 7–12)
POTASSIUM SERPL-SCNC: 3.9 MMOL/L (ref 3.5–5)
RBC # BLD AUTO: 4.52 M/UL (ref 3.8–5.8)
SODIUM SERPL-SCNC: 135 MMOL/L (ref 132–146)
WBC OTHER # BLD: 10 K/UL (ref 4.5–11.5)

## 2023-09-22 PROCEDURE — 6370000000 HC RX 637 (ALT 250 FOR IP): Performed by: PSYCHIATRY & NEUROLOGY

## 2023-09-22 PROCEDURE — G0378 HOSPITAL OBSERVATION PER HR: HCPCS

## 2023-09-22 PROCEDURE — 6370000000 HC RX 637 (ALT 250 FOR IP): Performed by: FAMILY MEDICINE

## 2023-09-22 PROCEDURE — 2060000000 HC ICU INTERMEDIATE R&B

## 2023-09-22 PROCEDURE — 51798 US URINE CAPACITY MEASURE: CPT

## 2023-09-22 PROCEDURE — 87086 URINE CULTURE/COLONY COUNT: CPT

## 2023-09-22 PROCEDURE — 80048 BASIC METABOLIC PNL TOTAL CA: CPT

## 2023-09-22 PROCEDURE — 6360000004 HC RX CONTRAST MEDICATION: Performed by: RADIOLOGY

## 2023-09-22 PROCEDURE — 36415 COLL VENOUS BLD VENIPUNCTURE: CPT

## 2023-09-22 PROCEDURE — 85025 COMPLETE CBC W/AUTO DIFF WBC: CPT

## 2023-09-22 PROCEDURE — 70498 CT ANGIOGRAPHY NECK: CPT

## 2023-09-22 PROCEDURE — 88112 CYTOPATH CELL ENHANCE TECH: CPT

## 2023-09-22 PROCEDURE — 97530 THERAPEUTIC ACTIVITIES: CPT

## 2023-09-22 PROCEDURE — 99222 1ST HOSP IP/OBS MODERATE 55: CPT | Performed by: PSYCHIATRY & NEUROLOGY

## 2023-09-22 PROCEDURE — 6370000000 HC RX 637 (ALT 250 FOR IP): Performed by: INTERNAL MEDICINE

## 2023-09-22 PROCEDURE — 2580000003 HC RX 258: Performed by: FAMILY MEDICINE

## 2023-09-22 PROCEDURE — 97535 SELF CARE MNGMENT TRAINING: CPT

## 2023-09-22 PROCEDURE — 82962 GLUCOSE BLOOD TEST: CPT

## 2023-09-22 PROCEDURE — 70496 CT ANGIOGRAPHY HEAD: CPT

## 2023-09-22 RX ORDER — ASPIRIN 81 MG/1
81 TABLET ORAL DAILY
Qty: 30 TABLET | Refills: 3 | Status: SHIPPED | OUTPATIENT
Start: 2023-09-23

## 2023-09-22 RX ORDER — PSEUDOEPHEDRINE HCL 30 MG
100 TABLET ORAL NIGHTLY
COMMUNITY
Start: 2023-09-22

## 2023-09-22 RX ADMIN — FLUTICASONE PROPIONATE 1 SPRAY: 50 SPRAY, METERED NASAL at 20:58

## 2023-09-22 RX ADMIN — DOCUSATE SODIUM 100 MG: 100 CAPSULE, LIQUID FILLED ORAL at 20:58

## 2023-09-22 RX ADMIN — APIXABAN 5 MG: 5 TABLET, FILM COATED ORAL at 09:58

## 2023-09-22 RX ADMIN — ACETAMINOPHEN 650 MG: 325 TABLET ORAL at 00:25

## 2023-09-22 RX ADMIN — APIXABAN 5 MG: 5 TABLET, FILM COATED ORAL at 20:58

## 2023-09-22 RX ADMIN — METOPROLOL SUCCINATE 50 MG: 50 TABLET, EXTENDED RELEASE ORAL at 20:58

## 2023-09-22 RX ADMIN — SODIUM CHLORIDE: 9 INJECTION, SOLUTION INTRAVENOUS at 14:07

## 2023-09-22 RX ADMIN — METOPROLOL SUCCINATE 50 MG: 50 TABLET, EXTENDED RELEASE ORAL at 09:58

## 2023-09-22 RX ADMIN — ASPIRIN 81 MG: 81 TABLET, COATED ORAL at 09:58

## 2023-09-22 RX ADMIN — CARBIDOPA AND LEVODOPA 1 TABLET: 25; 100 TABLET ORAL at 14:06

## 2023-09-22 RX ADMIN — AMIODARONE HYDROCHLORIDE 200 MG: 200 TABLET ORAL at 09:58

## 2023-09-22 RX ADMIN — PANTOPRAZOLE SODIUM 40 MG: 40 TABLET, DELAYED RELEASE ORAL at 05:17

## 2023-09-22 RX ADMIN — AMLODIPINE BESYLATE 10 MG: 10 TABLET ORAL at 09:58

## 2023-09-22 RX ADMIN — IOPAMIDOL 75 ML: 755 INJECTION, SOLUTION INTRAVENOUS at 18:11

## 2023-09-22 RX ADMIN — SODIUM CHLORIDE, PRESERVATIVE FREE 10 ML: 5 INJECTION INTRAVENOUS at 20:58

## 2023-09-22 ASSESSMENT — PAIN - FUNCTIONAL ASSESSMENT: PAIN_FUNCTIONAL_ASSESSMENT: ACTIVITIES ARE NOT PREVENTED

## 2023-09-22 ASSESSMENT — PAIN SCALES - GENERAL
PAINLEVEL_OUTOF10: 0
PAINLEVEL_OUTOF10: 0
PAINLEVEL_OUTOF10: 5
PAINLEVEL_OUTOF10: 0
PAINLEVEL_OUTOF10: 0

## 2023-09-22 ASSESSMENT — PAIN DESCRIPTION - DESCRIPTORS: DESCRIPTORS: ACHING;DISCOMFORT

## 2023-09-22 ASSESSMENT — PAIN DESCRIPTION - LOCATION: LOCATION: GENERALIZED

## 2023-09-22 NOTE — DISCHARGE INSTRUCTIONS
You were seen during your hospital stay for hematuria (blood in the urine) by Dr. Caballero/Jose. It is very important that you complete the evaluation and that you follow up in the office for an office based procedure called \"cystoscopy. \"  Please call the office to a make a follow up appointment upon discharge, [phone].    Hold amiodarone until you talk to Dr Cristin Garrett office on Monday per Dr Shavonne West

## 2023-09-22 NOTE — CARE COORDINATION
CM Update: Discharge order noted. Pt returning home with no needs. Awaiting Neuro consult.  CM to follow (TF)        Audrey Cardoso  Case Management  [phone]

## 2023-09-22 NOTE — PROGRESS NOTES
Perfect serve message sent to Dr. Rogelio Edwards this evening regarding patient's CTA of head and neck resulting. Notified that patient received one dose of sinemet this afternoon and does not report improvement of symtpoms at this point/gait doesn't appear to be improved yet. Notified that primary placed discharge order earlier- inquired if patient is okay to leave tonight and f/u outpatient. Per Dr. Adalgisa Carbone can follow up outpatient if he's doing well otherwise\".

## 2023-09-22 NOTE — PROGRESS NOTES
OCCUPATIONAL THERAPY TREATMENT NOTE    GLORY Davidarben  [facility]    [facility]        Date:2023  Patient Name: Jennifer Bearden  MRN: [id number]  : 1950  Room: [address] [place] [address]       Evaluating OT: Trevon Alegre OTR/L; [id number]        Referring Provider: ANIYAH Branch CNP    Specific Provider Orders/Date: OT Eval and Treat 23        Diagnosis: Chest pain; Orthostatic hypotension     Surgery: None this admission     Pertinent Medical History:  has a past medical history of Arthritis, Atrial flutter ([address]), Cancer ([address]), Diabetes mellitus ([address]), History of blood transfusion, Hyperlipidemia, Hypertension, Neoplasm of duodenum, DALTON (obstructive sleep apnea), RBBB, and Thyroid disease.       Recommended Adaptive Equipment: TBD      Precautions:  Fall Risk, monitor BP      Assessment of current deficits    [x] Functional mobility            [x]ADLs           [x] Strength                  []Cognition    [x] Functional transfers          [x] IADLs         [x] Safety Awareness   [x]Endurance    [] Fine Coordination                         [x] Balance      [] Vision/perception   []Sensation      []Gross Motor Coordination             [] ROM           [] Delirium                   [] Motor Control      OT PLAN OF CARE   OT POC based on physician orders, patient diagnosis and results of clinical assessment     Frequency/Duration 1-3 days/wk for 2 weeks PRN   Specific OT Treatment Interventions to include:   * Instruction/training on adapted ADL techniques and AE recommendations to increase functional independence within precautions       * Training on energy conservation strategies, correct breathing pattern and techniques to improve independence/tolerance for self-care routine  * Functional transfer/mobility training/DME recommendations for increased independence, safety, and fall prevention  * Patient/Family education to increase follow Independent   Sit to supine: Independent    Functional Transfers Sit to stand:SBA   Stand to sit:SBA  Stand pivot: SBA  Commode: SBA Supervision sit<>stand from EOB no AD  Sit to stand:Independent    Stand to sit: Independent   Stand pivot: Independent   Commode: Independent     Functional Mobility SBA  No use of AD to<>from hallway SBA house hold distances with IV pole initially then no AD  Independent    Balance Sitting:     Static - Supervision     Dynamic - Supervision  Standing: SBA w/o AD Sitting:  Static: Independent   Dynamic: Independent   Standing: supervision no AD  Sitting:     Static: Independent      Dynamic: Independent   Standing: Independent    Activity Tolerance Fair Fair+ Good   Visual/  Perceptual Glasses: Yes  Appears WFL          Safety Fair   Good  during ADL completion   Vitals BP supine 111/63, 98 bpm.   BP seated /85, 85 bpm.  BP standing 105/72, 108 bpm.  RN made aware. Comments: Upon arrival pt supine in bed, agreeable to therapy session, with wife present in room. Pt educated with regards to functional transfers, functional mobility, hand placement/safety, energy conservation techniques, LE dressing, DME, bathroom safety. At end of session pt seated EOB  all lines and tubes intact, call light within reach. Pt has made good  progress towards set goals.    Continue with current plan of care      Treatment Time In:1455            Treatment Time Out: [facility]                Treatment Charges: Mins Units   Ther Ex  80507     Manual Therapy [address] [place]     Thera Activities 09680 15 1   ADL/Home Mgt 04003 10 1   Neuro Re-ed 13748     Group Therapy      Orthotic manage/training  24285     Non-Billable Time     Total Timed Treatment 25 [address] ASHER/L [address]47

## 2023-09-22 NOTE — PROGRESS NOTES
Patient voided 225ml in urinal. Sample collected for cytology. Bladder scan completed. Patient PVR 0ml.

## 2023-09-23 VITALS
SYSTOLIC BLOOD PRESSURE: 126 MMHG | HEART RATE: 87 BPM | OXYGEN SATURATION: 98 % | HEIGHT: 73 IN | BODY MASS INDEX: 28.49 KG/M2 | RESPIRATION RATE: 16 BRPM | WEIGHT: 215 LBS | DIASTOLIC BLOOD PRESSURE: 72 MMHG | TEMPERATURE: 97 F

## 2023-09-23 LAB
ANION GAP SERPL CALCULATED.3IONS-SCNC: 10 MMOL/L (ref 7–16)
BASOPHILS # BLD: 0.01 K/UL (ref 0–0.2)
BASOPHILS NFR BLD: 0 % (ref 0–2)
BUN SERPL-MCNC: 14 MG/DL (ref 6–23)
CALCIUM SERPL-MCNC: 8.6 MG/DL (ref 8.6–10.2)
CHLORIDE SERPL-SCNC: 102 MMOL/L (ref 98–107)
CO2 SERPL-SCNC: 22 MMOL/L (ref 22–29)
CREAT SERPL-MCNC: 0.7 MG/DL (ref 0.7–1.2)
EOSINOPHIL # BLD: 0.19 K/UL (ref 0.05–0.5)
EOSINOPHILS RELATIVE PERCENT: 3 % (ref 0–6)
ERYTHROCYTE [DISTWIDTH] IN BLOOD BY AUTOMATED COUNT: 16.1 % (ref 11.5–15)
GFR SERPL CREATININE-BSD FRML MDRD: >60 ML/MIN/1.73M2
GLUCOSE BLD-MCNC: 122 MG/DL (ref 74–99)
GLUCOSE SERPL-MCNC: 122 MG/DL (ref 74–99)
HCT VFR BLD AUTO: 34 % (ref 37–54)
HGB BLD-MCNC: 10.9 G/DL (ref 12.5–16.5)
IMM GRANULOCYTES # BLD AUTO: 0.03 K/UL (ref 0–0.58)
IMM GRANULOCYTES NFR BLD: 0 % (ref 0–5)
LYMPHOCYTES NFR BLD: 0.7 K/UL (ref 1.5–4)
LYMPHOCYTES RELATIVE PERCENT: 10 % (ref 20–42)
MCH RBC QN AUTO: 27.6 PG (ref 26–35)
MCHC RBC AUTO-ENTMCNC: 32.1 G/DL (ref 32–34.5)
MCV RBC AUTO: 86.1 FL (ref 80–99.9)
MICROORGANISM SPEC CULT: NO GROWTH
MONOCYTES NFR BLD: 0.87 K/UL (ref 0.1–0.95)
MONOCYTES NFR BLD: 12 % (ref 2–12)
NEUTROPHILS NFR BLD: 75 % (ref 43–80)
NEUTS SEG NFR BLD: 5.46 K/UL (ref 1.8–7.3)
PLATELET, FLUORESCENCE: 128 K/UL (ref 130–450)
PMV BLD AUTO: 11 FL (ref 7–12)
POTASSIUM SERPL-SCNC: 3.6 MMOL/L (ref 3.5–5)
RBC # BLD AUTO: 3.95 M/UL (ref 3.8–5.8)
SODIUM SERPL-SCNC: 134 MMOL/L (ref 132–146)
SPECIMEN DESCRIPTION: NORMAL
WBC OTHER # BLD: 7.3 K/UL (ref 4.5–11.5)

## 2023-09-23 PROCEDURE — 6370000000 HC RX 637 (ALT 250 FOR IP): Performed by: FAMILY MEDICINE

## 2023-09-23 PROCEDURE — 85025 COMPLETE CBC W/AUTO DIFF WBC: CPT

## 2023-09-23 PROCEDURE — 6370000000 HC RX 637 (ALT 250 FOR IP): Performed by: NURSE PRACTITIONER

## 2023-09-23 PROCEDURE — 80048 BASIC METABOLIC PNL TOTAL CA: CPT

## 2023-09-23 PROCEDURE — G0378 HOSPITAL OBSERVATION PER HR: HCPCS

## 2023-09-23 PROCEDURE — 36415 COLL VENOUS BLD VENIPUNCTURE: CPT

## 2023-09-23 PROCEDURE — 82962 GLUCOSE BLOOD TEST: CPT

## 2023-09-23 PROCEDURE — 6370000000 HC RX 637 (ALT 250 FOR IP): Performed by: INTERNAL MEDICINE

## 2023-09-23 RX ORDER — MECLIZINE HCL 12.5 MG/1
12.5 TABLET ORAL 3 TIMES DAILY PRN
Qty: 30 TABLET | Refills: 0 | Status: SHIPPED | OUTPATIENT
Start: 2023-09-23 | End: 2023-10-03

## 2023-09-23 RX ORDER — MECLIZINE HCL 12.5 MG/1
12.5 TABLET ORAL 3 TIMES DAILY PRN
Status: DISCONTINUED | OUTPATIENT
Start: 2023-09-23 | End: 2023-09-23 | Stop reason: HOSPADM

## 2023-09-23 RX ADMIN — METOPROLOL SUCCINATE 50 MG: 50 TABLET, EXTENDED RELEASE ORAL at 09:31

## 2023-09-23 RX ADMIN — MECLIZINE 12.5 MG: 12.5 TABLET ORAL at 11:46

## 2023-09-23 RX ADMIN — AMLODIPINE BESYLATE 10 MG: 10 TABLET ORAL at 09:30

## 2023-09-23 RX ADMIN — APIXABAN 5 MG: 5 TABLET, FILM COATED ORAL at 09:30

## 2023-09-23 RX ADMIN — PANTOPRAZOLE SODIUM 40 MG: 40 TABLET, DELAYED RELEASE ORAL at 05:13

## 2023-09-23 RX ADMIN — ASPIRIN 81 MG: 81 TABLET, COATED ORAL at 09:31

## 2023-09-23 ASSESSMENT — PAIN SCALES - GENERAL: PAINLEVEL_OUTOF10: 0

## 2023-09-23 NOTE — DISCHARGE SUMMARY
[facility]   Discharge summary   Patient ID:  Rogers Prather  [id number]  22 y.o.  1950    Admit date: 9/20/2023    Discharge date and time: 9/23/2023    Admission Diagnoses:   Patient Active Problem List   Diagnosis    Hypertension    Type 2 diabetes mellitus without complication (HCC)    Persistent atrial fibrillation (HCC)    RBBB    History of nuclear stress test    History of echocardiogram    Hyperlipidemia    DALTON (obstructive sleep apnea)    Atrial flutter (HCC)    Atrial flutter by electrocardiography [facility])    Palpitations    Problem    Spinal stenosis of lumbar region    Thigh pain    GI bleed    Anemia, unspecified    S/P ablation of atrial fibrillation    Paroxysmal atrial fibrillation (HCC)    Chest pain    SIRS (systemic inflammatory response syndrome) ([address])       Discharge Diagnoses: chest pain     Consults: cardiology and neurology    Procedures: none    Hospital Course:     Patient is a 15-year-old male admitted to [facility] for  Chest pain  -Patient has been cleared by cardiology to discharge home, he is to keep his appointment with electrophysiology to undergo scheduled cardioversion  -Urology was consulted for hematuria, H&H stable, no evidence of urinary tract infection, patient can discharge from urology standpoint  -Neurology consulted for tremors to rule out Parkinson's.   CTAs of his head and neck were obtained to rule out cerebral hypoperfusion with trial of Sinemet.   -Plan is for discharge home  -If okay with consultants patient is stable for discharge from a medicine standpoint    9/23/23:  -Further neurology work-up was unremarkable  -Sinemet had been started secondary to rule out Parkinson's however it was felt the symptoms were all stemming from initiation of amiodarone  -EP notified of not tolerating amiodarone and was instructed he no longer needed to take it however to follow-up with Dr. Terrie Leon on Monday as patient was scheduled for a cardioversion on fluticasone 50 MCG/ACT nasal spray  Commonly known as: FLONASE     meloxicam 15 MG tablet  Commonly known as: MOBIC     metFORMIN 500 MG tablet  Commonly known as: GLUCOPHAGE     metoprolol succinate 50 MG extended release tablet  Commonly known as: TOPROL XL  Take 1 tablet by mouth 2 times daily     Multi Vitamin Mens Tabs     pantoprazole 40 MG tablet  Commonly known as: PROTONIX  Take 1 tablet by mouth every morning (before breakfast)     POTASSIUM PO     vitamin C 500 MG tablet  Commonly known as: ASCORBIC ACID     Vitamin D3 250 MCG (40465 UT) Tabs               Where to Get Your Medications        These medications were sent to [address] [address]      Phone: [phone]   aspirin 81 MG EC tablet       You can get these medications from any pharmacy    You don't need a prescription for these medications  docusate 100 MG Caps       Activity: activity as tolerated  Diet: regular diet    Pt has been advised to: Follow-up with Real Perez MD in 1 week. Follow-up with consultants as recommended by them    Note that over 30 minutes was spent in preparing discharge papers, discussing discharge with patient, medication review, etc.    Signed:  ANIYAH Johnston CNP  9/23/2023  7:10 AM     Above note edited to reflect my thoughts     I personally saw, examined and provided care for the patient. Radiographs, labs and medication list were reviewed by me independently. The case was discussed in detail and plans for care were established. Review of CONY Johnston   , documentation was conducted and revisions were made as appropriate directly by me. I agree with the above documented exam, problem list, and plan of care.      Abel Lombardi MD  3:37 AM  9/25/2023

## 2023-09-24 LAB
MICROORGANISM SPEC CULT: NORMAL
MICROORGANISM SPEC CULT: NORMAL
SERVICE CMNT-IMP: NORMAL
SERVICE CMNT-IMP: NORMAL
SPECIMEN DESCRIPTION: NORMAL
SPECIMEN DESCRIPTION: NORMAL

## 2023-09-25 ENCOUNTER — TELEPHONE (OUTPATIENT)
Dept: NON INVASIVE DIAGNOSTICS | Age: 73
End: 2023-09-25

## 2023-09-25 NOTE — TELEPHONE ENCOUNTER
Patient was in hospital last week. Patient stated that he was having severe side effects from Amiodarone. Patient was hospitalized for 5 days and was told to stop the Amiodarone. Patient scheduled for DCCV on 10/9/23.

## 2023-09-26 LAB — NON-GYN CYTOLOGY REPORT: NORMAL

## 2023-09-26 RX ORDER — DIPHENHYDRAMINE HYDROCHLORIDE 50 MG/ML
12.5 INJECTION INTRAMUSCULAR; INTRAVENOUS ONCE
Start: 2023-09-26 | End: 2023-09-26

## 2023-09-26 RX ORDER — DIPHENHYDRAMINE HYDROCHLORIDE 50 MG/ML
50 INJECTION INTRAMUSCULAR; INTRAVENOUS
OUTPATIENT
Start: 2023-09-26

## 2023-09-26 RX ORDER — HEPARIN 100 UNIT/ML
500 SYRINGE INTRAVENOUS PRN
OUTPATIENT
Start: 2023-09-26

## 2023-09-26 RX ORDER — SODIUM CHLORIDE 9 MG/ML
5-250 INJECTION, SOLUTION INTRAVENOUS PRN
OUTPATIENT
Start: 2023-09-26

## 2023-09-26 RX ORDER — SODIUM CHLORIDE 9 MG/ML
INJECTION, SOLUTION INTRAVENOUS CONTINUOUS
OUTPATIENT
Start: 2023-09-26

## 2023-09-26 RX ORDER — EPINEPHRINE 1 MG/ML
0.3 INJECTION, SOLUTION, CONCENTRATE INTRAVENOUS PRN
OUTPATIENT
Start: 2023-09-26

## 2023-09-26 RX ORDER — SODIUM CHLORIDE 0.9 % (FLUSH) 0.9 %
5-40 SYRINGE (ML) INJECTION PRN
OUTPATIENT
Start: 2023-09-26

## 2023-09-28 NOTE — TELEPHONE ENCOUNTER
Spoke with patient and verbalized understanding. Patient wanted to know if DCCV could be moved up? Patient is currently scheduled for ekg 2 weeks s/p stopping Amiodarone. Patient was already scheduled for DCCV 10/9/23. Patient has severe fatigue and is sleeping most of the day away and that is why he wants procedure moved up.

## 2023-10-03 ENCOUNTER — TELEPHONE (OUTPATIENT)
Dept: CARDIAC CATH/INVASIVE PROCEDURES | Age: 73
End: 2023-10-03

## 2023-10-03 NOTE — PROGRESS NOTES
Physician Progress Note      Natalie Morenojjar  [facility] #:                  [id number]  :                       1950  ADMIT DATE:       2023 9:45 AM  DISCH DATE:        2023 2:28 PM  RESPONDING  PROVIDER #:        Antoine Lozano MD          QUERY TEXT:    Pt admitted with Chest pain, dizziness, tremors, lightheadedness. Cardiology   stating non anginal chest pain. Neurology consult workup tremors to rule out   Parkinsons, felt symptoms stemming from initiation of amiodarone. Also noted   to have positive orthostatic vitals with lightheadedness upon standing. If   possible, please document in progress notes and discharge summary if you are   evaluating and/or treating any of the following: The medical record reflects the following:  Risk Factors: amiodarone, orthostatic hypotension  Clinical Indicators: Cardiology consult \"Nonanginal chest pain recent stress   test in 2023 showed no ischemia or infarct currently no chest pain. No   significant valvular heart disease. Jett Dionicio Jett Dionicio No further ischemic work-up is planned at   this time. His symptoms are nonischemic in nature; Patient had positive   orthostatic vitals with lightheadedness upon standing. Patient was admitted   for SIRS, orthostatic hypotension, and chest pain. \". Neurology consult \"She   also describes episodes with coarse whole body shaking associated with fatigue   afterwards. This would not be typical for   Treatment: Initiate therapeutic trial of Sinemet. Head CTA, follow up for   cardioversion, no longer take amiodarone. Thank you,  Kirsten Cruz, RN, BSN, CRCR, Clinical Documentation Improvement  Options provided:  -- Symptoms due to Parkinsons  -- Symptoms as adverse effect from initiation of amiodarone  -- Symptoms due to orthostatic hypotension  -- Symptoms due to other specified, please specify.   -- Other - I will add my own diagnosis  -- Disagree - Not applicable / Not valid  -- Disagree - Clinically unable to

## 2023-10-04 ENCOUNTER — HOSPITAL ENCOUNTER (OUTPATIENT)
Dept: CARDIAC CATH/INVASIVE PROCEDURES | Age: 73
Discharge: HOME | End: 2023-10-04
Payer: MEDICARE

## 2023-10-04 ENCOUNTER — ANESTHESIA (OUTPATIENT)
Dept: CARDIAC CATH/INVASIVE PROCEDURES | Age: 73
End: 2023-10-04
Payer: MEDICARE

## 2023-10-04 ENCOUNTER — ANESTHESIA EVENT (OUTPATIENT)
Dept: CARDIAC CATH/INVASIVE PROCEDURES | Age: 73
End: 2023-10-04
Payer: MEDICARE

## 2023-10-04 VITALS
DIASTOLIC BLOOD PRESSURE: 74 MMHG | RESPIRATION RATE: 20 BRPM | BODY MASS INDEX: 28.49 KG/M2 | OXYGEN SATURATION: 96 % | SYSTOLIC BLOOD PRESSURE: 114 MMHG | WEIGHT: 215 LBS | TEMPERATURE: 97.5 F | HEART RATE: 61 BPM | HEIGHT: 73 IN

## 2023-10-04 LAB
ANION GAP SERPL CALCULATED.3IONS-SCNC: 6 MMOL/L (ref 7–16)
BASOPHILS # BLD: 0.04 K/UL (ref 0–0.2)
BASOPHILS NFR BLD: 1 % (ref 0–2)
BUN SERPL-MCNC: 19 MG/DL (ref 6–23)
CALCIUM SERPL-MCNC: 9.8 MG/DL (ref 8.6–10.2)
CHLORIDE SERPL-SCNC: 101 MMOL/L (ref 98–107)
CO2 SERPL-SCNC: 29 MMOL/L (ref 22–29)
CREAT SERPL-MCNC: 1 MG/DL (ref 0.7–1.2)
EKG ATRIAL RATE: 50 BPM
EKG P AXIS: 88 DEGREES
EKG P-R INTERVAL: 140 MS
EKG Q-T INTERVAL: 470 MS
EKG QRS DURATION: 136 MS
EKG QTC CALCULATION (BAZETT): 428 MS
EKG R AXIS: -11 DEGREES
EKG T AXIS: 2 DEGREES
EKG VENTRICULAR RATE: 50 BPM
EOSINOPHIL # BLD: 0.1 K/UL (ref 0.05–0.5)
EOSINOPHILS RELATIVE PERCENT: 2 % (ref 0–6)
ERYTHROCYTE [DISTWIDTH] IN BLOOD BY AUTOMATED COUNT: 15.9 % (ref 11.5–15)
GFR SERPL CREATININE-BSD FRML MDRD: >60 ML/MIN/1.73M2
GLUCOSE SERPL-MCNC: 101 MG/DL (ref 74–99)
HCT VFR BLD AUTO: 41.2 % (ref 37–54)
HGB BLD-MCNC: 13.2 G/DL (ref 12.5–16.5)
IMM GRANULOCYTES # BLD AUTO: <0.03 K/UL (ref 0–0.58)
IMM GRANULOCYTES NFR BLD: 0 % (ref 0–5)
LYMPHOCYTES NFR BLD: 1.05 K/UL (ref 1.5–4)
LYMPHOCYTES RELATIVE PERCENT: 23 % (ref 20–42)
MAGNESIUM SERPL-MCNC: 2.1 MG/DL (ref 1.6–2.6)
MCH RBC QN AUTO: 28.5 PG (ref 26–35)
MCHC RBC AUTO-ENTMCNC: 32 G/DL (ref 32–34.5)
MCV RBC AUTO: 89 FL (ref 80–99.9)
MONOCYTES NFR BLD: 0.49 K/UL (ref 0.1–0.95)
MONOCYTES NFR BLD: 11 % (ref 2–12)
NEUTROPHILS NFR BLD: 62 % (ref 43–80)
NEUTS SEG NFR BLD: 2.79 K/UL (ref 1.8–7.3)
PLATELET # BLD AUTO: 183 K/UL (ref 130–450)
PMV BLD AUTO: 10.8 FL (ref 7–12)
POTASSIUM SERPL-SCNC: 5.4 MMOL/L (ref 3.5–5)
RBC # BLD AUTO: 4.63 M/UL (ref 3.8–5.8)
SODIUM SERPL-SCNC: 136 MMOL/L (ref 132–146)
WBC OTHER # BLD: 4.5 K/UL (ref 4.5–11.5)

## 2023-10-04 PROCEDURE — 83735 ASSAY OF MAGNESIUM: CPT

## 2023-10-04 PROCEDURE — 6360000002 HC RX W HCPCS: Performed by: NURSE ANESTHETIST, CERTIFIED REGISTERED

## 2023-10-04 PROCEDURE — 2580000003 HC RX 258: Performed by: NURSE ANESTHETIST, CERTIFIED REGISTERED

## 2023-10-04 PROCEDURE — 80048 BASIC METABOLIC PNL TOTAL CA: CPT

## 2023-10-04 PROCEDURE — 93005 ELECTROCARDIOGRAM TRACING: CPT | Performed by: INTERNAL MEDICINE

## 2023-10-04 PROCEDURE — 3700000000 HC ANESTHESIA ATTENDED CARE: Performed by: STUDENT IN AN ORGANIZED HEALTH CARE EDUCATION/TRAINING PROGRAM

## 2023-10-04 PROCEDURE — 85025 COMPLETE CBC W/AUTO DIFF WBC: CPT

## 2023-10-04 PROCEDURE — 92960 CARDIOVERSION ELECTRIC EXT: CPT

## 2023-10-04 PROCEDURE — 2709999900 HC NON-CHARGEABLE SUPPLY

## 2023-10-04 RX ORDER — SODIUM CHLORIDE 0.9 % (FLUSH) 0.9 %
5-40 SYRINGE (ML) INJECTION EVERY 12 HOURS SCHEDULED
Status: CANCELLED | OUTPATIENT
Start: 2023-10-04

## 2023-10-04 RX ORDER — FLECAINIDE ACETATE 100 MG/1
100 TABLET ORAL 2 TIMES DAILY
Qty: 60 TABLET | Refills: 3 | Status: SHIPPED | OUTPATIENT
Start: 2023-10-04

## 2023-10-04 RX ORDER — SODIUM CHLORIDE 0.9 % (FLUSH) 0.9 %
5-40 SYRINGE (ML) INJECTION PRN
Status: CANCELLED | OUTPATIENT
Start: 2023-10-04

## 2023-10-04 RX ORDER — SODIUM CHLORIDE 9 MG/ML
INJECTION, SOLUTION INTRAVENOUS CONTINUOUS PRN
Status: DISCONTINUED | OUTPATIENT
Start: 2023-10-04 | End: 2023-10-04 | Stop reason: SDUPTHER

## 2023-10-04 RX ORDER — SODIUM CHLORIDE 9 MG/ML
INJECTION, SOLUTION INTRAVENOUS PRN
Status: CANCELLED | OUTPATIENT
Start: 2023-10-04

## 2023-10-04 RX ORDER — PROPOFOL 10 MG/ML
INJECTION, EMULSION INTRAVENOUS PRN
Status: DISCONTINUED | OUTPATIENT
Start: 2023-10-04 | End: 2023-10-04 | Stop reason: SDUPTHER

## 2023-10-04 RX ADMIN — PROPOFOL 60 MG: 10 INJECTION, EMULSION INTRAVENOUS at 15:55

## 2023-10-04 RX ADMIN — SODIUM CHLORIDE: 9 INJECTION, SOLUTION INTRAVENOUS at 15:50

## 2023-10-04 NOTE — ANESTHESIA PRE PROCEDURE
Department of Anesthesiology  Preprocedure Note       Name:  Bong Caldwell   Age:  74 y.o.  :  1950                                          MRN:  [id number]         Date:  10/4/2023      Surgeon: * Surgery not found *    Procedure: cardioversion    Medications prior to admission:   Prior to Admission medications    Medication Sig Start Date End Date Taking?  Authorizing Provider   aspirin 81 MG EC tablet Take 1 tablet by mouth daily 23   ANIYAH Peter CNP   docusate sodium (COLACE, DULCOLAX) 100 MG CAPS Take 100 mg by mouth nightly 23   Suzetteice ANIYAH Sarmiento - CNP   pantoprazole (PROTONIX) 40 MG tablet Take 1 tablet by mouth every morning (before breakfast) 23   ANIYAH Mariscal CNP   apixaban (ELIQUIS) 5 MG TABS tablet Take 1 tablet by mouth 2 times daily    Radha Duval MD   meloxicam (MOBIC) 15 MG tablet Take 1 tablet by mouth daily    Radha Duval MD   metFORMIN (GLUCOPHAGE) 500 MG tablet Take 1 tablet by mouth daily (with breakfast)    Radha Duval MD   POTASSIUM PO Take 25 mg by mouth daily    Radha Duval MD   diphenhydrAMINE-APAP, sleep, (TYLENOL PM EXTRA STRENGTH)  MG tablet Take 2 tablets by mouth nightly as needed    Radha Duval MD   metoprolol succinate (TOPROL XL) 50 MG extended release tablet Take 1 tablet by mouth 2 times daily 20   Slick Fletcher MD   fluticasone (FLONASE) 50 MCG/ACT nasal spray 1 spray by Nasal route nightly    Radha Duval MD   Multiple Vitamin (MULTI VITAMIN MENS) TABS Take 1 tablet by mouth daily    Radha Duval MD   Cholecalciferol (VITAMIN D3) 250 MCG (15435 UT) TABS Take 10,000 Units by mouth daily    Radha Duval MD   vitamin C (ASCORBIC ACID) 500 MG tablet Take 1 tablet by mouth daily    Radha Duval MD   amLODIPine (NORVASC) 10 MG tablet Take 1 tablet by mouth daily 9/5/15   Radha Duval MD       Current medications:

## 2023-10-04 NOTE — PROCEDURES
[address] [facility] Electrophysiology  Procedure Report  PATIENT: Sarai Mancera  MEDICAL RECORD NUMBER: [id number]  DATE OF PROCEDURE:  10/4/2023  ATTENDING ELECTROPHYSIOLOGIST:  Prabhjot Cortez MD  REFERRING PHYSICIAN: Dr. Schultz Friendly:    1. Direct Current Electrical Cardioversion    INDICATION: Persistent atrial fibrillation    PROCEDURE PERFORMED By: Prabhjot Cortez MD    PROCEDURE TIME: 15 minutes    COMPICATIONS: None immediately apparent    ANESTHESIA: LMAC    DESCRIPTION OF PROCEDURE: The risks, benefits, and alternatives to the procedure were discussed with the patient, and informed consent was obtained. The patient was brought to the cardiovascular lab and sedated under the guidance of anesthesia. Once anesthesia was deemed adequate, a 200 J biphasic synchronous shock was applied and successfully restored normal sinus rhythm. The patient was then allowed to wake in the usual fashion. Comment: The patient was therapeutically anticoagulated for a minimum of 3 consecutive weeks prior to cardioversion. SUMMARY:  1. Successful cardioversion of atypical atrial flutter to sinus rhythm. RECOMMENDATIONS:  1. Discharge to home when fully awake and alert, if clinically stable. 2. Resume all pre-procedure medications, including anticoagulation. 3. Start Flecainide 100 mg BID and EKG in 1 week. 4. Decrease Toprol XL to 50 mg daily if HR <50 bpm  5. Follow-up in 1 week for EKG and in 1 month with provider.     Prabhjot Cortez MD  Cardiac Electrophysiology  [address] [facility]  [facility] [place]: [facility] Electrophysiology  4:45 PM  10/4/2023

## 2023-10-04 NOTE — DISCHARGE INSTRUCTIONS
[facility]  Post DC-Cardioversion Patient Discharge Instructions    1. No driving the day of the procedure. 2. Please resume your medications as instructed. 3. Please call the office at [phone] to schedule a follow-up appointment in 1 week for EKG and in 1 month with provider.     4. Decrease Toprol XL to 50 mg daily if HR <50 bpm

## 2023-10-04 NOTE — H&P
Cardiac Electrophysiology History and Physical Examination    Chayito Chu  1950  Date of Service: 10/4/2023  PCP: Niko Jerry MD  Cardiologist: Daisy Montalvo. Vaishnavi Hester MD  Electrophysiologist: Josh Krishnan MD    Subjective:  Chayito Chu is a 74 y.o. yo male who is seen in Cardiac Electrophysiology clinic for follow up of paroxysmal atrial fibrillation and atrial flutter. At his last office visit, he opted to proceed with AF ablation. Unfortunately, prior to his scheduled ablation he was admitted for anemia and his procedure was cancelled. 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 He reports he has not restarted [address] He presents today in AF with CVR. The patient denies any chest pain, dyspnea, palpitations, dizziness, syncope, orthopnea or paroxysmal nocturnal dyspnea. Discussed with him at great length that we will resume Eliquis after we check with Surgery and PCP. Will schedule AF ablation at least 3 weeks after resuming Eliquis if no recurrent bleeding. He will need cardiac CTA for pulmonary vein anatomies before the procedure. 7/26/23: The patient is seen in the hospital for elective cryo-balloon and CTI RFA ablation. He was restarted on Eliquis and no further bleeding noted for the past 1 month. A detailed discussion was had regarding the risks, benefits, and alternatives to the atrial fibrillation ablation procedure. The procedure was described in detail. I quoted the patient an overall 4-6% risk of major complications which include but are not limited to: bleeding, infection, blood clot, vascular injury requiring surgical repair, phrenic nerve injury, pulmonary vein stenosis, valvular injury, damage to the cardiac conduction system requiring pacemaker implantation, cardiac perforation and tamponade, heart attack, stroke, atrioesophgeal fistula, and death.  The patient understands

## 2023-10-05 ENCOUNTER — HOSPITAL ENCOUNTER (OUTPATIENT)
Dept: INFUSION THERAPY | Age: 73
Setting detail: INFUSION SERIES
Discharge: HOME | End: 2023-10-05
Payer: MEDICARE

## 2023-10-05 VITALS
TEMPERATURE: 97.5 F | HEART RATE: 56 BPM | RESPIRATION RATE: 16 BRPM | SYSTOLIC BLOOD PRESSURE: 126 MMHG | OXYGEN SATURATION: 99 % | DIASTOLIC BLOOD PRESSURE: 68 MMHG

## 2023-10-05 DIAGNOSIS — D64.9 ANEMIA, UNSPECIFIED TYPE: Primary | ICD-10-CM

## 2023-10-05 PROCEDURE — 96376 TX/PRO/DX INJ SAME DRUG ADON: CPT

## 2023-10-05 PROCEDURE — 2580000003 HC RX 258: Performed by: FAMILY MEDICINE

## 2023-10-05 PROCEDURE — 96365 THER/PROPH/DIAG IV INF INIT: CPT

## 2023-10-05 PROCEDURE — 6360000002 HC RX W HCPCS: Performed by: FAMILY MEDICINE

## 2023-10-05 RX ORDER — SODIUM CHLORIDE 0.9 % (FLUSH) 0.9 %
5-40 SYRINGE (ML) INJECTION PRN
Status: DISCONTINUED | OUTPATIENT
Start: 2023-10-05 | End: 2023-10-06 | Stop reason: HOSPADM

## 2023-10-05 RX ORDER — SODIUM CHLORIDE 9 MG/ML
INJECTION, SOLUTION INTRAVENOUS CONTINUOUS
OUTPATIENT
Start: 2023-10-12

## 2023-10-05 RX ORDER — SODIUM CHLORIDE 9 MG/ML
5-250 INJECTION, SOLUTION INTRAVENOUS PRN
OUTPATIENT
Start: 2023-10-12

## 2023-10-05 RX ORDER — SODIUM CHLORIDE 9 MG/ML
5-250 INJECTION, SOLUTION INTRAVENOUS PRN
Status: CANCELLED | OUTPATIENT
Start: 2023-10-12

## 2023-10-05 RX ORDER — EPINEPHRINE 1 MG/ML
0.3 INJECTION, SOLUTION, CONCENTRATE INTRAVENOUS PRN
OUTPATIENT
Start: 2023-10-12

## 2023-10-05 RX ORDER — SODIUM CHLORIDE 9 MG/ML
5-250 INJECTION, SOLUTION INTRAVENOUS PRN
Status: DISCONTINUED | OUTPATIENT
Start: 2023-10-05 | End: 2023-10-06 | Stop reason: HOSPADM

## 2023-10-05 RX ORDER — SODIUM CHLORIDE 0.9 % (FLUSH) 0.9 %
5-40 SYRINGE (ML) INJECTION PRN
Status: CANCELLED | OUTPATIENT
Start: 2023-10-12

## 2023-10-05 RX ORDER — DIPHENHYDRAMINE HYDROCHLORIDE 50 MG/ML
12.5 INJECTION INTRAMUSCULAR; INTRAVENOUS ONCE
Status: COMPLETED | OUTPATIENT
Start: 2023-10-05 | End: 2023-10-05

## 2023-10-05 RX ORDER — DIPHENHYDRAMINE HYDROCHLORIDE 50 MG/ML
12.5 INJECTION INTRAMUSCULAR; INTRAVENOUS ONCE
Status: CANCELLED
Start: 2023-10-12 | End: 2023-10-12

## 2023-10-05 RX ORDER — HEPARIN 100 UNIT/ML
500 SYRINGE INTRAVENOUS PRN
OUTPATIENT
Start: 2023-10-12

## 2023-10-05 RX ORDER — DIPHENHYDRAMINE HYDROCHLORIDE 50 MG/ML
50 INJECTION INTRAMUSCULAR; INTRAVENOUS
OUTPATIENT
Start: 2023-10-12

## 2023-10-05 RX ADMIN — DIPHENHYDRAMINE HYDROCHLORIDE 12.5 MG: 50 INJECTION, SOLUTION INTRAMUSCULAR; INTRAVENOUS at 10:49

## 2023-10-05 RX ADMIN — DIPHENHYDRAMINE HYDROCHLORIDE 12.5 MG: 50 INJECTION, SOLUTION INTRAMUSCULAR; INTRAVENOUS at 09:48

## 2023-10-05 RX ADMIN — SODIUM CHLORIDE, PRESERVATIVE FREE 10 ML: 5 INJECTION INTRAVENOUS at 09:40

## 2023-10-05 RX ADMIN — SODIUM CHLORIDE 125 MG: 9 INJECTION, SOLUTION INTRAVENOUS at 10:20

## 2023-10-05 RX ADMIN — SODIUM CHLORIDE 30 ML/HR: 9 INJECTION, SOLUTION INTRAVENOUS at 11:24

## 2023-10-05 NOTE — PROGRESS NOTES
Patient tolerated infusion well. No complaints of itching ,hives or swelling. Patient unable to stay for one hour of observation due to other commitments. He did stay for 45 minutes of observation. Vital signs stable and patient able to ambulate in unit with no issues.  Patient discharged in stable condition,

## 2023-10-05 NOTE — FLOWSHEET NOTE
Patient tolerated infusion well. Remained on unit for 45 minutes after treatment. Patient alert and oriented x3. No distress noted. Vital signs stable. Patient denies any new or worsening pain. Educated patient on possible side effects and treatment of medication. Patient verbalized understanding. Offered patient education and/or discharge material. Patient declined. Patient denies any needs. All questions answered. D/C in stable condition.

## 2023-10-05 NOTE — ANESTHESIA POSTPROCEDURE EVALUATION
Department of Anesthesiology  Postprocedure Note    Patient: Raúl Wise  MRN: [id number]  YOB: 1950  Date of evaluation: 10/4/2023      Procedure Summary     Date: 10/04/23 Room / Location: [facility]    Anesthesia Start: 9492 Anesthesia Stop: 1894    Procedure: CARDIOVERSION WITH ANESTHESIA Diagnosis: Other persistent atrial fibrillation    Scheduled Providers:  Responsible Provider: Jonah Tse MD    Anesthesia Type: MAC ASA Status: 3          Anesthesia Type: No value filed.     Anand Phase I:      Anand Phase II:        Anesthesia Post Evaluation    Patient location during evaluation: PACU  Patient participation: complete - patient participated  Level of consciousness: awake and alert  Airway patency: patent  Nausea & Vomiting: no nausea and no vomiting  Complications: no  Cardiovascular status: blood pressure returned to baseline  Respiratory status: acceptable  Hydration status: euvolemic  Pain management: adequate

## 2023-10-06 LAB
EKG ATRIAL RATE: 63 BPM
EKG P AXIS: 80 DEGREES
EKG P-R INTERVAL: 272 MS
EKG Q-T INTERVAL: 462 MS
EKG QRS DURATION: 142 MS
EKG QTC CALCULATION (BAZETT): 472 MS
EKG R AXIS: 1 DEGREES
EKG T AXIS: 6 DEGREES
EKG VENTRICULAR RATE: 63 BPM

## 2023-10-09 ENCOUNTER — TELEPHONE (OUTPATIENT)
Dept: NON INVASIVE DIAGNOSTICS | Age: 73
End: 2023-10-09

## 2023-10-09 NOTE — TELEPHONE ENCOUNTER
Patient called in to let Dr Pili Landa know that he went in and out of afib a few times since his DCCV  10/4/23. Patient has 1 week ekg schedule 10/11/23 and OV 10/19/23.

## 2023-10-11 ENCOUNTER — TELEPHONE (OUTPATIENT)
Dept: NON INVASIVE DIAGNOSTICS | Age: 73
End: 2023-10-11

## 2023-10-11 ENCOUNTER — NURSE ONLY (OUTPATIENT)
Dept: NON INVASIVE DIAGNOSTICS | Age: 73
End: 2023-10-11
Payer: MEDICARE

## 2023-10-11 DIAGNOSIS — I48.19 PERSISTENT ATRIAL FIBRILLATION (HCC): Primary | ICD-10-CM

## 2023-10-11 PROCEDURE — 93000 ELECTROCARDIOGRAM COMPLETE: CPT | Performed by: INTERNAL MEDICINE

## 2023-10-11 NOTE — TELEPHONE ENCOUNTER
----- Message from Prabhjot Cortez MD sent at 10/11/2023 10:16 AM EDT -----  Back in AF. Cardioversion in 2-3 weeks on Flecainide.  Thanks.  ----- Message -----  From: [place], [place]  Sent: 10/11/2023   9:33 AM EDT  To: Prabhjot Cortez MD

## 2023-10-11 NOTE — PROGRESS NOTES
EKG preformed today as per Dr Ryan Choi, for Afib. Pt states He/She feels lousy    EKG done, discussed with Nurse , advised pt we will send to provider for further review, pt has appointment with Dr. Soo Saleem next week and if provider has any recommendations prior to his appointment we will let him know. Advised pt to call if anything changes. Patient verbalized understanding.     Electronically signed by Thai Gutierrez MA on 10/11/2023 at 9:39 AM

## 2023-10-12 ENCOUNTER — HOSPITAL ENCOUNTER (OUTPATIENT)
Dept: INFUSION THERAPY | Age: 73
Setting detail: INFUSION SERIES
Discharge: HOME | End: 2023-10-12
Payer: MEDICARE

## 2023-10-12 VITALS
SYSTOLIC BLOOD PRESSURE: 123 MMHG | DIASTOLIC BLOOD PRESSURE: 80 MMHG | TEMPERATURE: 97.7 F | OXYGEN SATURATION: 97 % | RESPIRATION RATE: 16 BRPM | HEART RATE: 72 BPM

## 2023-10-12 DIAGNOSIS — D64.9 ANEMIA, UNSPECIFIED TYPE: Primary | ICD-10-CM

## 2023-10-12 PROCEDURE — 2580000003 HC RX 258: Performed by: FAMILY MEDICINE

## 2023-10-12 PROCEDURE — 96365 THER/PROPH/DIAG IV INF INIT: CPT

## 2023-10-12 PROCEDURE — 96376 TX/PRO/DX INJ SAME DRUG ADON: CPT

## 2023-10-12 PROCEDURE — 6360000002 HC RX W HCPCS: Performed by: FAMILY MEDICINE

## 2023-10-12 PROCEDURE — 96375 TX/PRO/DX INJ NEW DRUG ADDON: CPT

## 2023-10-12 RX ORDER — SODIUM CHLORIDE 9 MG/ML
5-250 INJECTION, SOLUTION INTRAVENOUS PRN
Status: CANCELLED | OUTPATIENT
Start: 2023-10-19

## 2023-10-12 RX ORDER — EPINEPHRINE 1 MG/ML
0.3 INJECTION, SOLUTION, CONCENTRATE INTRAVENOUS PRN
OUTPATIENT
Start: 2023-10-19

## 2023-10-12 RX ORDER — DIPHENHYDRAMINE HYDROCHLORIDE 50 MG/ML
50 INJECTION INTRAMUSCULAR; INTRAVENOUS
OUTPATIENT
Start: 2023-10-19

## 2023-10-12 RX ORDER — DIPHENHYDRAMINE HYDROCHLORIDE 50 MG/ML
12.5 INJECTION INTRAMUSCULAR; INTRAVENOUS ONCE
Status: COMPLETED | OUTPATIENT
Start: 2023-10-12 | End: 2023-10-12

## 2023-10-12 RX ORDER — SODIUM CHLORIDE 9 MG/ML
5-250 INJECTION, SOLUTION INTRAVENOUS PRN
OUTPATIENT
Start: 2023-10-19

## 2023-10-12 RX ORDER — SODIUM CHLORIDE 0.9 % (FLUSH) 0.9 %
5-40 SYRINGE (ML) INJECTION PRN
Status: CANCELLED | OUTPATIENT
Start: 2023-10-19

## 2023-10-12 RX ORDER — SODIUM CHLORIDE 9 MG/ML
5-250 INJECTION, SOLUTION INTRAVENOUS PRN
Status: DISCONTINUED | OUTPATIENT
Start: 2023-10-12 | End: 2023-10-13 | Stop reason: HOSPADM

## 2023-10-12 RX ORDER — DIPHENHYDRAMINE HYDROCHLORIDE 50 MG/ML
12.5 INJECTION INTRAMUSCULAR; INTRAVENOUS ONCE
Status: CANCELLED
Start: 2023-10-19 | End: 2023-10-19

## 2023-10-12 RX ORDER — SODIUM CHLORIDE 9 MG/ML
INJECTION, SOLUTION INTRAVENOUS CONTINUOUS
OUTPATIENT
Start: 2023-10-19

## 2023-10-12 RX ORDER — SODIUM CHLORIDE 0.9 % (FLUSH) 0.9 %
5-40 SYRINGE (ML) INJECTION PRN
Status: DISCONTINUED | OUTPATIENT
Start: 2023-10-12 | End: 2023-10-13 | Stop reason: HOSPADM

## 2023-10-12 RX ORDER — HEPARIN 100 UNIT/ML
500 SYRINGE INTRAVENOUS PRN
OUTPATIENT
Start: 2023-10-19

## 2023-10-12 RX ADMIN — SODIUM CHLORIDE, PRESERVATIVE FREE 10 ML: 5 INJECTION INTRAVENOUS at 10:44

## 2023-10-12 RX ADMIN — SODIUM CHLORIDE 110 ML/HR: 9 INJECTION, SOLUTION INTRAVENOUS at 12:03

## 2023-10-12 RX ADMIN — SODIUM CHLORIDE 125 MG: 9 INJECTION, SOLUTION INTRAVENOUS at 11:00

## 2023-10-12 RX ADMIN — SODIUM CHLORIDE, PRESERVATIVE FREE 10 ML: 5 INJECTION INTRAVENOUS at 10:30

## 2023-10-12 RX ADMIN — DIPHENHYDRAMINE HYDROCHLORIDE 12.5 MG: 50 INJECTION, SOLUTION INTRAMUSCULAR; INTRAVENOUS at 11:27

## 2023-10-12 RX ADMIN — DIPHENHYDRAMINE HYDROCHLORIDE 12.5 MG: 50 INJECTION, SOLUTION INTRAMUSCULAR; INTRAVENOUS at 10:37

## 2023-10-12 NOTE — PROGRESS NOTES
Patient tolerated iron infusion well. Remained on unit for 33 minutes after treatment. Patient alert and oriented x3. No distress noted. Vital signs stable. Patient denies any new or worsening pain. Educated patient on possible side effects and treatment of medication. Patient verbalized understanding. Provided patient education and/or discharge material. Patient denies any needs. All questions answered. D/C in stable condition.

## 2023-10-19 ENCOUNTER — HOSPITAL ENCOUNTER (OUTPATIENT)
Dept: INFUSION THERAPY | Age: 73
Setting detail: INFUSION SERIES
Discharge: HOME | End: 2023-10-19
Payer: MEDICARE

## 2023-10-19 VITALS
SYSTOLIC BLOOD PRESSURE: 122 MMHG | RESPIRATION RATE: 16 BRPM | OXYGEN SATURATION: 98 % | DIASTOLIC BLOOD PRESSURE: 78 MMHG | TEMPERATURE: 97.7 F | HEART RATE: 83 BPM

## 2023-10-19 DIAGNOSIS — D64.9 ANEMIA, UNSPECIFIED TYPE: Primary | ICD-10-CM

## 2023-10-19 PROCEDURE — 6360000002 HC RX W HCPCS: Performed by: FAMILY MEDICINE

## 2023-10-19 PROCEDURE — 96365 THER/PROPH/DIAG IV INF INIT: CPT

## 2023-10-19 PROCEDURE — 2580000003 HC RX 258: Performed by: FAMILY MEDICINE

## 2023-10-19 PROCEDURE — 96376 TX/PRO/DX INJ SAME DRUG ADON: CPT

## 2023-10-19 RX ORDER — SODIUM CHLORIDE 0.9 % (FLUSH) 0.9 %
5-40 SYRINGE (ML) INJECTION PRN
Status: DISCONTINUED | OUTPATIENT
Start: 2023-10-19 | End: 2023-10-20 | Stop reason: HOSPADM

## 2023-10-19 RX ORDER — DIPHENHYDRAMINE HYDROCHLORIDE 50 MG/ML
12.5 INJECTION INTRAMUSCULAR; INTRAVENOUS ONCE
Status: COMPLETED | OUTPATIENT
Start: 2023-10-19 | End: 2023-10-19

## 2023-10-19 RX ORDER — SODIUM CHLORIDE 9 MG/ML
5-250 INJECTION, SOLUTION INTRAVENOUS PRN
Status: DISCONTINUED | OUTPATIENT
Start: 2023-10-19 | End: 2023-10-20 | Stop reason: HOSPADM

## 2023-10-19 RX ORDER — EPINEPHRINE 1 MG/ML
0.3 INJECTION, SOLUTION, CONCENTRATE INTRAVENOUS PRN
OUTPATIENT
Start: 2023-10-26

## 2023-10-19 RX ORDER — NITROFURANTOIN MACROCRYSTALS 100 MG/1
100 CAPSULE ORAL 2 TIMES DAILY
COMMUNITY
End: 2023-10-26 | Stop reason: ALTCHOICE

## 2023-10-19 RX ORDER — SODIUM CHLORIDE 9 MG/ML
5-250 INJECTION, SOLUTION INTRAVENOUS PRN
Status: CANCELLED | OUTPATIENT
Start: 2023-10-26

## 2023-10-19 RX ORDER — SODIUM CHLORIDE 9 MG/ML
INJECTION, SOLUTION INTRAVENOUS CONTINUOUS
OUTPATIENT
Start: 2023-10-26

## 2023-10-19 RX ORDER — HEPARIN 100 UNIT/ML
500 SYRINGE INTRAVENOUS PRN
OUTPATIENT
Start: 2023-10-26

## 2023-10-19 RX ORDER — DIPHENHYDRAMINE HYDROCHLORIDE 50 MG/ML
12.5 INJECTION INTRAMUSCULAR; INTRAVENOUS ONCE
Status: CANCELLED
Start: 2023-10-26 | End: 2023-10-26

## 2023-10-19 RX ORDER — SODIUM CHLORIDE 0.9 % (FLUSH) 0.9 %
5-40 SYRINGE (ML) INJECTION PRN
Status: CANCELLED | OUTPATIENT
Start: 2023-10-26

## 2023-10-19 RX ORDER — DIPHENHYDRAMINE HYDROCHLORIDE 50 MG/ML
50 INJECTION INTRAMUSCULAR; INTRAVENOUS
OUTPATIENT
Start: 2023-10-26

## 2023-10-19 RX ADMIN — SODIUM CHLORIDE, PRESERVATIVE FREE 10 ML: 5 INJECTION INTRAVENOUS at 09:46

## 2023-10-19 RX ADMIN — DIPHENHYDRAMINE HYDROCHLORIDE 12.5 MG: 50 INJECTION, SOLUTION INTRAMUSCULAR; INTRAVENOUS at 10:36

## 2023-10-19 RX ADMIN — SODIUM CHLORIDE 125 MG: 9 INJECTION, SOLUTION INTRAVENOUS at 10:03

## 2023-10-19 RX ADMIN — SODIUM CHLORIDE, PRESERVATIVE FREE 10 ML: 5 INJECTION INTRAVENOUS at 10:37

## 2023-10-19 RX ADMIN — SODIUM CHLORIDE 110 ML/HR: 9 INJECTION, SOLUTION INTRAVENOUS at 11:01

## 2023-10-19 RX ADMIN — DIPHENHYDRAMINE HYDROCHLORIDE 12.5 MG: 50 INJECTION, SOLUTION INTRAMUSCULAR; INTRAVENOUS at 09:45

## 2023-10-19 RX ADMIN — SODIUM CHLORIDE, PRESERVATIVE FREE 10 ML: 5 INJECTION INTRAVENOUS at 10:38

## 2023-10-19 RX ADMIN — SODIUM CHLORIDE, PRESERVATIVE FREE 10 ML: 5 INJECTION INTRAVENOUS at 09:39

## 2023-10-19 NOTE — PROGRESS NOTES
Patient tolerated iron infusion well. Remained on unit for 30 minutes. Patient refused to stay the full hour per the order after treatment. Patient alert and oriented x3. No distress noted. Vital signs stable. Patient denies any new or worsening pain. Offered patient education and/or discharge material. Patient declined. Patient denies any needs. All questions answered. D/C in stable condition.

## 2023-10-26 ENCOUNTER — HOSPITAL ENCOUNTER (OUTPATIENT)
Dept: INFUSION THERAPY | Age: 73
Setting detail: INFUSION SERIES
Discharge: HOME | End: 2023-10-26
Payer: MEDICARE

## 2023-10-26 VITALS
DIASTOLIC BLOOD PRESSURE: 65 MMHG | SYSTOLIC BLOOD PRESSURE: 115 MMHG | OXYGEN SATURATION: 98 % | TEMPERATURE: 97.5 F | RESPIRATION RATE: 16 BRPM | HEART RATE: 78 BPM

## 2023-10-26 DIAGNOSIS — D64.9 ANEMIA, UNSPECIFIED TYPE: Primary | ICD-10-CM

## 2023-10-26 PROCEDURE — 2580000003 HC RX 258: Performed by: FAMILY MEDICINE

## 2023-10-26 PROCEDURE — 6360000002 HC RX W HCPCS: Performed by: FAMILY MEDICINE

## 2023-10-26 PROCEDURE — 96376 TX/PRO/DX INJ SAME DRUG ADON: CPT

## 2023-10-26 PROCEDURE — 96365 THER/PROPH/DIAG IV INF INIT: CPT

## 2023-10-26 RX ORDER — SODIUM CHLORIDE 9 MG/ML
5-250 INJECTION, SOLUTION INTRAVENOUS PRN
Status: CANCELLED | OUTPATIENT
Start: 2023-11-02

## 2023-10-26 RX ORDER — DIPHENHYDRAMINE HYDROCHLORIDE 50 MG/ML
12.5 INJECTION INTRAMUSCULAR; INTRAVENOUS ONCE
Status: COMPLETED | OUTPATIENT
Start: 2023-10-26 | End: 2023-10-26

## 2023-10-26 RX ORDER — DIPHENHYDRAMINE HYDROCHLORIDE 50 MG/ML
12.5 INJECTION INTRAMUSCULAR; INTRAVENOUS ONCE
Status: CANCELLED
Start: 2023-11-02 | End: 2023-11-02

## 2023-10-26 RX ORDER — DIPHENHYDRAMINE HYDROCHLORIDE 50 MG/ML
50 INJECTION INTRAMUSCULAR; INTRAVENOUS
OUTPATIENT
Start: 2023-11-02

## 2023-10-26 RX ORDER — SODIUM CHLORIDE 9 MG/ML
5-250 INJECTION, SOLUTION INTRAVENOUS PRN
OUTPATIENT
Start: 2023-11-02

## 2023-10-26 RX ORDER — SODIUM CHLORIDE 9 MG/ML
INJECTION, SOLUTION INTRAVENOUS CONTINUOUS
OUTPATIENT
Start: 2023-11-02

## 2023-10-26 RX ORDER — SODIUM CHLORIDE 9 MG/ML
5-250 INJECTION, SOLUTION INTRAVENOUS PRN
Status: DISCONTINUED | OUTPATIENT
Start: 2023-10-26 | End: 2023-10-27 | Stop reason: HOSPADM

## 2023-10-26 RX ORDER — HEPARIN 100 UNIT/ML
500 SYRINGE INTRAVENOUS PRN
OUTPATIENT
Start: 2023-11-02

## 2023-10-26 RX ORDER — EPINEPHRINE 1 MG/ML
0.3 INJECTION, SOLUTION, CONCENTRATE INTRAVENOUS PRN
OUTPATIENT
Start: 2023-11-02

## 2023-10-26 RX ORDER — SODIUM CHLORIDE 0.9 % (FLUSH) 0.9 %
5-40 SYRINGE (ML) INJECTION PRN
Status: DISCONTINUED | OUTPATIENT
Start: 2023-10-26 | End: 2023-10-27 | Stop reason: HOSPADM

## 2023-10-26 RX ORDER — SODIUM CHLORIDE 0.9 % (FLUSH) 0.9 %
5-40 SYRINGE (ML) INJECTION PRN
Status: CANCELLED | OUTPATIENT
Start: 2023-11-02

## 2023-10-26 RX ADMIN — SODIUM CHLORIDE, PRESERVATIVE FREE 10 ML: 5 INJECTION INTRAVENOUS at 09:43

## 2023-10-26 RX ADMIN — SODIUM CHLORIDE, PRESERVATIVE FREE 10 ML: 5 INJECTION INTRAVENOUS at 09:13

## 2023-10-26 RX ADMIN — SODIUM CHLORIDE 125 MG: 9 INJECTION, SOLUTION INTRAVENOUS at 09:42

## 2023-10-26 RX ADMIN — SODIUM CHLORIDE 110 ML/HR: 9 INJECTION, SOLUTION INTRAVENOUS at 10:47

## 2023-10-26 RX ADMIN — DIPHENHYDRAMINE HYDROCHLORIDE 12.5 MG: 50 INJECTION, SOLUTION INTRAMUSCULAR; INTRAVENOUS at 09:17

## 2023-10-26 RX ADMIN — SODIUM CHLORIDE, PRESERVATIVE FREE 10 ML: 5 INJECTION INTRAVENOUS at 10:25

## 2023-10-26 RX ADMIN — DIPHENHYDRAMINE HYDROCHLORIDE 12.5 MG: 50 INJECTION, SOLUTION INTRAMUSCULAR; INTRAVENOUS at 10:15

## 2023-10-26 NOTE — FLOWSHEET NOTE
Patient tolerated infusion well. Remained on unit for 30 minutes after treatment. patient refuses to stay for one hour for observation. Patient alert and oriented x3. No distress noted. Vital signs stable. Patient denies any new or worsening pain. Educated patient on possible side effects and treatment of medication. Patient verbalized understanding. Offered patient education and/or discharge material. Patient declined. Patient denies any needs. All questions answered. D/C in stable condition.

## 2023-10-27 ENCOUNTER — TELEPHONE (OUTPATIENT)
Dept: CARDIAC CATH/INVASIVE PROCEDURES | Age: 73
End: 2023-10-27

## 2023-10-30 ENCOUNTER — ANESTHESIA (OUTPATIENT)
Dept: CARDIAC CATH/INVASIVE PROCEDURES | Age: 73
End: 2023-10-30
Payer: MEDICARE

## 2023-10-30 ENCOUNTER — HOSPITAL ENCOUNTER (OUTPATIENT)
Dept: CARDIAC CATH/INVASIVE PROCEDURES | Age: 73
Discharge: HOME | End: 2023-10-30
Payer: MEDICARE

## 2023-10-30 ENCOUNTER — ANESTHESIA EVENT (OUTPATIENT)
Dept: CARDIAC CATH/INVASIVE PROCEDURES | Age: 73
End: 2023-10-30
Payer: MEDICARE

## 2023-10-30 VITALS
HEART RATE: 76 BPM | DIASTOLIC BLOOD PRESSURE: 86 MMHG | RESPIRATION RATE: 18 BRPM | OXYGEN SATURATION: 95 % | SYSTOLIC BLOOD PRESSURE: 135 MMHG | BODY MASS INDEX: 29.16 KG/M2 | WEIGHT: 220 LBS | HEIGHT: 73 IN | TEMPERATURE: 97.9 F

## 2023-10-30 LAB
ANION GAP SERPL CALCULATED.3IONS-SCNC: 11 MMOL/L (ref 7–16)
BASOPHILS # BLD: 0.05 K/UL (ref 0–0.2)
BASOPHILS NFR BLD: 1 % (ref 0–2)
BUN SERPL-MCNC: 19 MG/DL (ref 6–23)
CALCIUM SERPL-MCNC: 9.9 MG/DL (ref 8.6–10.2)
CHLORIDE SERPL-SCNC: 103 MMOL/L (ref 98–107)
CO2 SERPL-SCNC: 26 MMOL/L (ref 22–29)
CREAT SERPL-MCNC: 0.9 MG/DL (ref 0.7–1.2)
EKG ATRIAL RATE: 52 BPM
EKG ATRIAL RATE: 78 BPM
EKG P AXIS: 66 DEGREES
EKG P AXIS: 69 DEGREES
EKG P-R INTERVAL: 324 MS
EKG P-R INTERVAL: 82 MS
EKG Q-T INTERVAL: 456 MS
EKG Q-T INTERVAL: 512 MS
EKG QRS DURATION: 168 MS
EKG QRS DURATION: 182 MS
EKG QTC CALCULATION (BAZETT): 476 MS
EKG QTC CALCULATION (BAZETT): 519 MS
EKG R AXIS: -9 DEGREES
EKG R AXIS: 50 DEGREES
EKG T AXIS: -9 DEGREES
EKG T AXIS: 8 DEGREES
EKG VENTRICULAR RATE: 52 BPM
EKG VENTRICULAR RATE: 78 BPM
EOSINOPHIL # BLD: 0.32 K/UL (ref 0.05–0.5)
EOSINOPHILS RELATIVE PERCENT: 6 % (ref 0–6)
ERYTHROCYTE [DISTWIDTH] IN BLOOD BY AUTOMATED COUNT: 15.5 % (ref 11.5–15)
GFR SERPL CREATININE-BSD FRML MDRD: >60 ML/MIN/1.73M2
GLUCOSE SERPL-MCNC: 106 MG/DL (ref 74–99)
HCT VFR BLD AUTO: 43.3 % (ref 37–54)
HGB BLD-MCNC: 14.2 G/DL (ref 12.5–16.5)
IMM GRANULOCYTES # BLD AUTO: <0.03 K/UL (ref 0–0.58)
IMM GRANULOCYTES NFR BLD: 0 % (ref 0–5)
LYMPHOCYTES NFR BLD: 1.32 K/UL (ref 1.5–4)
LYMPHOCYTES RELATIVE PERCENT: 27 % (ref 20–42)
MAGNESIUM SERPL-MCNC: 2.2 MG/DL (ref 1.6–2.6)
MCH RBC QN AUTO: 29.6 PG (ref 26–35)
MCHC RBC AUTO-ENTMCNC: 32.8 G/DL (ref 32–34.5)
MCV RBC AUTO: 90.2 FL (ref 80–99.9)
MONOCYTES NFR BLD: 0.49 K/UL (ref 0.1–0.95)
MONOCYTES NFR BLD: 10 % (ref 2–12)
NEUTROPHILS NFR BLD: 56 % (ref 43–80)
NEUTS SEG NFR BLD: 2.79 K/UL (ref 1.8–7.3)
PLATELET # BLD AUTO: 150 K/UL (ref 130–450)
PMV BLD AUTO: 11 FL (ref 7–12)
POTASSIUM SERPL-SCNC: 4.9 MMOL/L (ref 3.5–5)
RBC # BLD AUTO: 4.8 M/UL (ref 3.8–5.8)
SODIUM SERPL-SCNC: 140 MMOL/L (ref 132–146)
WBC OTHER # BLD: 5 K/UL (ref 4.5–11.5)

## 2023-10-30 PROCEDURE — 3700000000 HC ANESTHESIA ATTENDED CARE: Performed by: ANESTHESIOLOGY

## 2023-10-30 PROCEDURE — 85025 COMPLETE CBC W/AUTO DIFF WBC: CPT

## 2023-10-30 PROCEDURE — 3700000001 HC ADD 15 MINUTES (ANESTHESIA): Performed by: ANESTHESIOLOGY

## 2023-10-30 PROCEDURE — 99215 OFFICE O/P EST HI 40 MIN: CPT | Performed by: INTERNAL MEDICINE

## 2023-10-30 PROCEDURE — 92960 CARDIOVERSION ELECTRIC EXT: CPT | Performed by: INTERNAL MEDICINE

## 2023-10-30 PROCEDURE — 93005 ELECTROCARDIOGRAM TRACING: CPT | Performed by: INTERNAL MEDICINE

## 2023-10-30 PROCEDURE — 2709999900 HC NON-CHARGEABLE SUPPLY

## 2023-10-30 PROCEDURE — 2580000003 HC RX 258: Performed by: ANESTHESIOLOGIST ASSISTANT

## 2023-10-30 PROCEDURE — 6360000002 HC RX W HCPCS: Performed by: ANESTHESIOLOGIST ASSISTANT

## 2023-10-30 PROCEDURE — 83735 ASSAY OF MAGNESIUM: CPT

## 2023-10-30 PROCEDURE — 80048 BASIC METABOLIC PNL TOTAL CA: CPT

## 2023-10-30 PROCEDURE — 92960 CARDIOVERSION ELECTRIC EXT: CPT

## 2023-10-30 RX ORDER — PROPOFOL 10 MG/ML
INJECTION, EMULSION INTRAVENOUS PRN
Status: DISCONTINUED | OUTPATIENT
Start: 2023-10-30 | End: 2023-10-30 | Stop reason: SDUPTHER

## 2023-10-30 RX ORDER — SODIUM CHLORIDE 9 MG/ML
INJECTION, SOLUTION INTRAVENOUS CONTINUOUS PRN
Status: DISCONTINUED | OUTPATIENT
Start: 2023-10-30 | End: 2023-10-30 | Stop reason: SDUPTHER

## 2023-10-30 RX ORDER — SODIUM CHLORIDE 9 MG/ML
INJECTION, SOLUTION INTRAVENOUS PRN
Status: CANCELLED | OUTPATIENT
Start: 2023-10-30

## 2023-10-30 RX ORDER — SODIUM CHLORIDE 0.9 % (FLUSH) 0.9 %
5-40 SYRINGE (ML) INJECTION PRN
Status: CANCELLED | OUTPATIENT
Start: 2023-10-30

## 2023-10-30 RX ORDER — SODIUM CHLORIDE 0.9 % (FLUSH) 0.9 %
5-40 SYRINGE (ML) INJECTION EVERY 12 HOURS SCHEDULED
Status: CANCELLED | OUTPATIENT
Start: 2023-10-30

## 2023-10-30 RX ORDER — SODIUM CHLORIDE 9 MG/ML
INJECTION, SOLUTION INTRAVENOUS CONTINUOUS
Status: DISCONTINUED | OUTPATIENT
Start: 2023-10-30 | End: 2023-10-31 | Stop reason: HOSPADM

## 2023-10-30 RX ADMIN — PROPOFOL 20 MG: 10 INJECTION, EMULSION INTRAVENOUS at 13:16

## 2023-10-30 RX ADMIN — PROPOFOL 20 MG: 10 INJECTION, EMULSION INTRAVENOUS at 13:17

## 2023-10-30 RX ADMIN — PROPOFOL 50 MG: 10 INJECTION, EMULSION INTRAVENOUS at 13:15

## 2023-10-30 RX ADMIN — SODIUM CHLORIDE: 9 INJECTION, SOLUTION INTRAVENOUS at 13:02

## 2023-10-30 NOTE — DISCHARGE INSTRUCTIONS
[facility]  Post DC-Cardioversion Patient Discharge Instructions    1. No driving the day of the procedure. 2. Please resume your medications as instructed. 3. Please call the office at [phone] to schedule a follow-up appointment in 1 week for EKG and in 1 month with provider.

## 2023-10-30 NOTE — ANESTHESIA POSTPROCEDURE EVALUATION
Department of Anesthesiology  Postprocedure Note    Patient: Litzy Madrigal  MRN: [id number]  YOB: 1950  Date of evaluation: 10/30/2023      Procedure Summary     Date: 10/30/23 Room / [place] [facility] [place] [place] [facility]    Anesthesia Start: 9786 Anesthesia Stop: 4837    Procedure: CARDIOVERSION WITH ANESTHESIA Diagnosis: Other persistent atrial fibrillation    Scheduled Providers:  Responsible Provider: Henry Norwood MD    Anesthesia Type: MAC ASA Status: 3          Anesthesia Type: No value filed.     Anand Phase I:      Anand Phase II:        Anesthesia Post Evaluation    Patient location during evaluation: PACU  Patient participation: complete - patient participated  Level of consciousness: awake  Pain score: 0  Airway patency: patent  Nausea & Vomiting: no nausea  Complications: no  Cardiovascular status: hemodynamically stable  Respiratory status: acceptable  Hydration status: stable

## 2023-10-30 NOTE — H&P
participate in the patient's care.     Prabhjot Cortez MD  Cardiac Electrophysiology  [address] [facility]  The Heart and Vascular [place]: [facility] Electrophysiology  10:58 AM  10/30/2023

## 2023-10-30 NOTE — PROCEDURES
[address] [facility] Electrophysiology  Procedure Report  PATIENT: Rajinder Fuller  MEDICAL RECORD NUMBER: [id number]  DATE OF PROCEDURE:  10/30/2023  ATTENDING ELECTROPHYSIOLOGIST:  Car Mack MD  REFERRING PHYSICIAN: Dr. Edil Hicks:    1. Direct Current Electrical Cardioversion    INDICATION: Persistent atrial fibrillation    PROCEDURE PERFORMED By: Car Mack MD    PROCEDURE TIME: 20 minutes    COMPICATIONS: None immediately apparent    ANESTHESIA: LMAC    DESCRIPTION OF PROCEDURE: The risks, benefits, and alternatives to the procedure were discussed with the patient, and informed consent was obtained. The patient was brought to the cardiovascular lab and sedated under the guidance of anesthesia. Once anesthesia was deemed adequate, a 200 J biphasic synchronous shock was applied and successfully restored normal sinus rhythm. The patient was then allowed to wake in the usual fashion. Comment: The patient was therapeutically anticoagulated for a minimum of 3 consecutive weeks prior to cardioversion. SUMMARY:  1. Successful cardioversion of persistent atrial fibrillation to sinus rhythm. RECOMMENDATIONS:  1. Discharge to home when fully awake and alert, if clinically stable. 2. Resume all pre-procedure medications, including anticoagulation. 3. Follow-up in the office in 1 week for EKG and in 1 month with provider.     Car Mack MD  Cardiac Electrophysiology  [address] [facility]  The Heart and Vascular [place]: [facility] Electrophysiology  1:12 PM  10/30/2023

## 2023-10-31 ENCOUNTER — TELEPHONE (OUTPATIENT)
Dept: NON INVASIVE DIAGNOSTICS | Age: 73
End: 2023-10-31

## 2023-10-31 NOTE — TELEPHONE ENCOUNTER
----- Message from [address] sent at 10/31/2023  1:06 PM EDT -----  Sotalol:  30 day/$5.00 at [facility], 90 day [facility] mail order $0  Tikosyn:  30 day/24.75 at [facility], 90 day [facility] mail order $83.89    ----- Message -----  From: Armond Huber MA  Sent: 10/30/2023   2:05 PM EDT  To: LUDY Waite    EKG is already scheduled. ----- Message -----  From: Edgar Foote MD  Sent: 10/30/2023   1:23 PM EDT  To: Armond Huber MA    EKG in 1 week and follow up in 1 month. Please check cost of Sotalol and Tikosyn. Thanks.

## 2023-10-31 NOTE — TELEPHONE ENCOUNTER
Patient notified of medication costs. The patient prefers the cheaper medication (sotalol) but will take what is recommended. Patient advised Dr. Aron Higginbotham will be notified and will get a call back with additional instructions. Patient verbalized understanding. Follow up appointments reviewed.     Electronically signed by Claudio Nichole MA on 10/31/2023 at 1:21 PM

## 2023-11-02 ENCOUNTER — HOSPITAL ENCOUNTER (OUTPATIENT)
Dept: INFUSION THERAPY | Age: 73
Setting detail: INFUSION SERIES
Discharge: HOME | End: 2023-11-02
Payer: MEDICARE

## 2023-11-02 VITALS
OXYGEN SATURATION: 98 % | HEART RATE: 50 BPM | RESPIRATION RATE: 18 BRPM | SYSTOLIC BLOOD PRESSURE: 103 MMHG | DIASTOLIC BLOOD PRESSURE: 64 MMHG | TEMPERATURE: 97.7 F

## 2023-11-02 DIAGNOSIS — D64.9 ANEMIA, UNSPECIFIED TYPE: Primary | ICD-10-CM

## 2023-11-02 PROCEDURE — 96365 THER/PROPH/DIAG IV INF INIT: CPT

## 2023-11-02 PROCEDURE — 96376 TX/PRO/DX INJ SAME DRUG ADON: CPT

## 2023-11-02 PROCEDURE — 6360000002 HC RX W HCPCS: Performed by: FAMILY MEDICINE

## 2023-11-02 PROCEDURE — 2580000003 HC RX 258: Performed by: FAMILY MEDICINE

## 2023-11-02 RX ORDER — SODIUM CHLORIDE 0.9 % (FLUSH) 0.9 %
5-40 SYRINGE (ML) INJECTION PRN
OUTPATIENT
Start: 2023-12-14

## 2023-11-02 RX ORDER — SODIUM CHLORIDE 9 MG/ML
5-250 INJECTION, SOLUTION INTRAVENOUS PRN
Status: DISCONTINUED | OUTPATIENT
Start: 2023-11-02 | End: 2023-11-03 | Stop reason: HOSPADM

## 2023-11-02 RX ORDER — SODIUM CHLORIDE 9 MG/ML
INJECTION, SOLUTION INTRAVENOUS CONTINUOUS
OUTPATIENT
Start: 2023-12-14

## 2023-11-02 RX ORDER — DIPHENHYDRAMINE HYDROCHLORIDE 50 MG/ML
50 INJECTION INTRAMUSCULAR; INTRAVENOUS
OUTPATIENT
Start: 2023-12-14

## 2023-11-02 RX ORDER — DIPHENHYDRAMINE HYDROCHLORIDE 50 MG/ML
12.5 INJECTION INTRAMUSCULAR; INTRAVENOUS ONCE
Status: COMPLETED | OUTPATIENT
Start: 2023-11-02 | End: 2023-11-02

## 2023-11-02 RX ORDER — DIPHENHYDRAMINE HYDROCHLORIDE 50 MG/ML
12.5 INJECTION INTRAMUSCULAR; INTRAVENOUS ONCE
Status: CANCELLED
Start: 2023-12-14 | End: 2023-12-14

## 2023-11-02 RX ORDER — SODIUM CHLORIDE 9 MG/ML
5-250 INJECTION, SOLUTION INTRAVENOUS PRN
OUTPATIENT
Start: 2023-12-14

## 2023-11-02 RX ORDER — HEPARIN 100 UNIT/ML
500 SYRINGE INTRAVENOUS PRN
OUTPATIENT
Start: 2023-12-14

## 2023-11-02 RX ORDER — EPINEPHRINE 1 MG/ML
0.3 INJECTION, SOLUTION, CONCENTRATE INTRAVENOUS PRN
OUTPATIENT
Start: 2023-12-14

## 2023-11-02 RX ORDER — SODIUM CHLORIDE 0.9 % (FLUSH) 0.9 %
5-40 SYRINGE (ML) INJECTION PRN
Status: DISCONTINUED | OUTPATIENT
Start: 2023-11-02 | End: 2023-11-03 | Stop reason: HOSPADM

## 2023-11-02 RX ORDER — SODIUM CHLORIDE 9 MG/ML
5-250 INJECTION, SOLUTION INTRAVENOUS PRN
Status: CANCELLED | OUTPATIENT
Start: 2023-12-14

## 2023-11-02 RX ADMIN — SODIUM CHLORIDE, PRESERVATIVE FREE 10 ML: 5 INJECTION INTRAVENOUS at 09:14

## 2023-11-02 RX ADMIN — DIPHENHYDRAMINE HYDROCHLORIDE 12.5 MG: 50 INJECTION, SOLUTION INTRAMUSCULAR; INTRAVENOUS at 09:59

## 2023-11-02 RX ADMIN — SODIUM CHLORIDE, PRESERVATIVE FREE 10 ML: 5 INJECTION INTRAVENOUS at 09:16

## 2023-11-02 RX ADMIN — SODIUM CHLORIDE, PRESERVATIVE FREE 10 ML: 5 INJECTION INTRAVENOUS at 09:58

## 2023-11-02 RX ADMIN — DIPHENHYDRAMINE HYDROCHLORIDE 12.5 MG: 50 INJECTION, SOLUTION INTRAMUSCULAR; INTRAVENOUS at 09:14

## 2023-11-02 RX ADMIN — SODIUM CHLORIDE, PRESERVATIVE FREE 10 ML: 5 INJECTION INTRAVENOUS at 09:59

## 2023-11-02 RX ADMIN — SODIUM CHLORIDE 125 MG: 9 INJECTION, SOLUTION INTRAVENOUS at 09:28

## 2023-11-02 NOTE — PROGRESS NOTES
Patient tolerated iron infusion well. Remained on unit for 30 minutes after treatment. Patient did not want to stay any longer. Patient alert and oriented x3. No distress noted. Vital signs stable. Patient denies any new or worsening pain. Offered patient education and/or discharge material. Patient declined. Patient denies any needs. All questions answered. D/C in stable condition.

## 2023-11-06 ENCOUNTER — TELEPHONE (OUTPATIENT)
Dept: NON INVASIVE DIAGNOSTICS | Age: 73
End: 2023-11-06

## 2023-11-06 ENCOUNTER — NURSE ONLY (OUTPATIENT)
Dept: NON INVASIVE DIAGNOSTICS | Age: 73
End: 2023-11-06
Payer: MEDICARE

## 2023-11-06 DIAGNOSIS — I48.19 PERSISTENT ATRIAL FIBRILLATION (HCC): Primary | ICD-10-CM

## 2023-11-06 PROCEDURE — 93000 ELECTROCARDIOGRAM COMPLETE: CPT | Performed by: INTERNAL MEDICINE

## 2023-11-06 NOTE — PROGRESS NOTES
EKG preformed today as per Dr Edgar Foote, for 1 week DCCV. Pt states He/She feels tired, and has low hr in the 40s at rest.     EKG done and pt advised it will be sent to the doctor to review and we will call with any further recommendations.      Electronically signed by Mariely Mckeon MA on 11/6/2023 at 10:59 AM

## 2023-11-06 NOTE — TELEPHONE ENCOUNTER
LM with patient that Dr. Soo Saleem recommends staying on flecainide for now to see how it works and if it doesn't then will revisit switching to Tikosyn.      Electronically signed by Daxa Taylor MA on 11/6/2023 at 1:24 PM

## 2023-11-14 RX ORDER — FLECAINIDE ACETATE 100 MG/1
100 TABLET ORAL 2 TIMES DAILY
Qty: 180 TABLET | Refills: 1 | Status: SHIPPED | OUTPATIENT
Start: 2023-11-14

## 2023-11-25 NOTE — TELEPHONE ENCOUNTER
----- Message from Isela Cook MD sent at 8/18/2022 11:17 AM EDT -----  Sure and restart ASAP when safe from bedimming POV. Thanks.  ----- Message -----  From: Levi Qureshi  Sent: 8/18/2022  10:30 AM EDT  To: Isela Cook MD    Patient called and states that Dr Kj Schmitz is wanting him to stop his Eliquis for 7 day. Patient is needing to have test done. He has blood in his urine.   Please advise   Link Karime No

## 2023-12-11 ENCOUNTER — OFFICE VISIT (OUTPATIENT)
Dept: NON INVASIVE DIAGNOSTICS | Age: 73
End: 2023-12-11
Payer: MEDICARE

## 2023-12-11 VITALS
BODY MASS INDEX: 29.55 KG/M2 | HEART RATE: 82 BPM | WEIGHT: 223 LBS | DIASTOLIC BLOOD PRESSURE: 82 MMHG | HEIGHT: 73 IN | SYSTOLIC BLOOD PRESSURE: 142 MMHG

## 2023-12-11 DIAGNOSIS — Z86.79 S/P ABLATION OF ATRIAL FIBRILLATION: ICD-10-CM

## 2023-12-11 DIAGNOSIS — G47.33 OSA (OBSTRUCTIVE SLEEP APNEA): ICD-10-CM

## 2023-12-11 DIAGNOSIS — I48.0 PAROXYSMAL ATRIAL FIBRILLATION (HCC): ICD-10-CM

## 2023-12-11 DIAGNOSIS — I45.10 RBBB: ICD-10-CM

## 2023-12-11 DIAGNOSIS — Z51.81 ENCOUNTER FOR MONITORING FLECAINIDE THERAPY: Primary | ICD-10-CM

## 2023-12-11 DIAGNOSIS — Z98.890 S/P ABLATION OF ATRIAL FIBRILLATION: ICD-10-CM

## 2023-12-11 DIAGNOSIS — Z79.899 ENCOUNTER FOR MONITORING FLECAINIDE THERAPY: Primary | ICD-10-CM

## 2023-12-11 DIAGNOSIS — I10 PRIMARY HYPERTENSION: ICD-10-CM

## 2023-12-11 PROCEDURE — 93000 ELECTROCARDIOGRAM COMPLETE: CPT

## 2023-12-11 ASSESSMENT — ENCOUNTER SYMPTOMS
RESPIRATORY NEGATIVE: 1
GASTROINTESTINAL NEGATIVE: 1

## 2023-12-11 NOTE — PROGRESS NOTES
[id number]-[id number] contact between the posterior wall of the left atrium  and the anterior wall of the mid lower thoracic esophagus without conspicuous  a mediastinal fat interface for a length of 6 cm in the vertical axis     LEFT ATRIAL APPENDAGE:     Left atrial appendage has a more windsock morphology with a single main lobe,  projecting laterally and anteriorly from the neck/landing zone. There are  trabeculations of the lateral wall of the left atrial appendage which  eventually fuse with contrast on the late images. There is no conspicuous  clot formation identified between the trabeculations. L AA ostium: 2.2 x 1.9 cm. L AA neck: 1.2 cm     Landing zone: 1.7 x 1.4 cm. Main lobe: 5 cm. PULMONARY VENOUS RETURN:     RIGHT SUPERIOR PULMONARY VENOUS RETURN:     Length: 1.2  cm with an early site branching project projecting inferiorly;  ostium: 2.2 cm by 1.8 cm. RIGHT INFERIOR PULMONARY VENOUS RETURN:     Length: 2.3 cm; ostium: 1.8 cm. LEFT SUPERIOR PULMONARY VENOUS RETURN:     Length: 2.7 cm; ostium: 1.7 x 2 cm     LEFT INFERIOR PULMONARY VENOUS RETURN:     Length: 2.2 cm; ostium: 1.7 x 1.8     HEART:     It has normal size. LV inner diameter: 4.2 cm. RV inner diameter 4.1 cm. Calcifications are seen in the coronary arteries. There is no pericardial  effusion. THORACIC AORTA: Not fully covered. What is visualized appearance. Mild  calcified atheromatous changes are seen in the arch of the aorta and in the  descending thoracic. Diameter for the ascending aorta 4.3 cm, it represents  a mild fusiform dilatation but less 4.5 cm. PULMONARY ARTERY CIRCULATION: Not fully included the. Contrast density was  target to evaluate the thoracic aorta/left atrium. No indication for acute  pulmonary embolus in the visualized areas of the artery circulation  bilaterally. Diameter of the main PA: 3.6 cm. MEDIASTINUM: Partially covered.   There is a lymph node of 14 by 7 mm in the  aortic

## 2024-01-11 ENCOUNTER — TELEPHONE (OUTPATIENT)
Dept: NON INVASIVE DIAGNOSTICS | Age: 74
End: 2024-01-11

## 2024-01-11 NOTE — TELEPHONE ENCOUNTER
Patient called in and stated he is feeling very fatigued and his heart rate has been running in the 40's.  Patient stated he has been in afib.  Patient has f/u scheduled in June 2024 with Dr Wolf and didn't want to wait to address this with him until then.

## 2024-01-12 ENCOUNTER — NURSE ONLY (OUTPATIENT)
Dept: NON INVASIVE DIAGNOSTICS | Age: 74
End: 2024-01-12
Payer: MEDICARE

## 2024-01-12 DIAGNOSIS — I48.0 PAROXYSMAL ATRIAL FIBRILLATION (HCC): Primary | ICD-10-CM

## 2024-01-12 PROCEDURE — 93000 ELECTROCARDIOGRAM COMPLETE: CPT | Performed by: INTERNAL MEDICINE

## 2024-01-12 NOTE — TELEPHONE ENCOUNTER
Spoke with patient and he verbalized understanding.  Patient would like to come in for EKG, does have [employer] but unsure on how to send EKG's (will show him when he comes to office).

## 2024-01-15 ENCOUNTER — TELEPHONE (OUTPATIENT)
Dept: NON INVASIVE DIAGNOSTICS | Age: 74
End: 2024-01-15

## 2024-01-15 ENCOUNTER — NURSE ONLY (OUTPATIENT)
Dept: NON INVASIVE DIAGNOSTICS | Age: 74
End: 2024-01-15

## 2024-01-15 DIAGNOSIS — I48.0 PAROXYSMAL ATRIAL FIBRILLATION (HCC): Primary | ICD-10-CM

## 2024-01-15 NOTE — TELEPHONE ENCOUNTER
Patient notified and will come in today at 1:20 PM to have put on. Order in Epic.     Electronically signed by Bryanna Amaral MA on 1/15/2024 at 9:25 AM

## 2024-01-15 NOTE — TELEPHONE ENCOUNTER
----- Message from Kriss Wolf MD sent at 1/12/2024  3:57 PM EST -----  Please schedule 14 day cardiac monitor. Thanks.  ----- Message -----  From: Connie Kraft LPN  Sent: 1/12/2024   2:42 PM EST  To: Kriss Wolf MD

## 2024-01-15 NOTE — PROGRESS NOTES
Patient was seen today and [employer] XT 14 day monitor was placed.  Monitor was ordered by Dr Kriss Wolf.  Monitor was applied and instructions given to patient.  Patient stated understanding and gave verbalized readback.    Monitor company: [employer]T 14 day  Serial number : [id number]    Electronically signed by Latrice Hayes MA on 1/15/2024 at 1:57 PM

## 2024-02-06 ENCOUNTER — TELEPHONE (OUTPATIENT)
Dept: NON INVASIVE DIAGNOSTICS | Age: 74
End: 2024-02-06

## 2024-02-06 DIAGNOSIS — I48.0 PAROXYSMAL ATRIAL FIBRILLATION (HCC): ICD-10-CM

## 2024-02-06 NOTE — TELEPHONE ENCOUNTER
----- Message from Kriss Wolf MD sent at 2/6/2024  8:14 AM EST -----  Please let him know that he is in 100% AFL. I would like him to stop Flecainide and consider start Tikosyn or Sotalol. Please  check cost. Thanks.  ----- Message -----  From: Jeniffer Amaral MA  Sent: 2/6/2024   8:06 AM EST  To: Kriss Wolf MD

## 2024-02-06 NOTE — TELEPHONE ENCOUNTER
Pt notified of results and verbalized understanding.  The patient will be contacted when we get the cost of the other medications to proceed.      Electronically signed by Bryanna Amaral MA on 2/6/2024 at 1:47 PM

## 2024-02-07 ENCOUNTER — TELEPHONE (OUTPATIENT)
Dept: NON INVASIVE DIAGNOSTICS | Age: 74
End: 2024-02-07

## 2024-02-07 NOTE — TELEPHONE ENCOUNTER
Per Daina at [facility], patient's co-pay is as follows:    Dofetilide  [facility] $41.16/30 day supply  [facility] mail order $124.27 90 day supply  Sotalol  [facility]  $5/30 day supply  [facility] mail order $0 - 90 day supply

## 2024-02-07 NOTE — TELEPHONE ENCOUNTER
The patient was given the pricing for Tikosyn and Sotalol. The patient wishes to proceed with Sotalol admit the week of March 25, 2024. Admitting notified.     Electronically signed by Bryanna Amaral MA on 2/7/2024 at 11:24 AM

## 2024-02-07 NOTE — TELEPHONE ENCOUNTER
----- Message from Bryanna Amaral MA sent at 2/6/2024  9:15 AM EST -----  Regarding: price check  Tikosyn or Sotalol. Please  check cost. Thanks.

## 2024-03-13 ENCOUNTER — TELEPHONE (OUTPATIENT)
Dept: NON INVASIVE DIAGNOSTICS | Age: 74
End: 2024-03-13

## 2024-03-13 NOTE — TELEPHONE ENCOUNTER
Patient is scheduled for EKG tomorrow.    Electronically signed by Bryanna Amaral MA on 3/13/2024 at 3:30 PM

## 2024-03-13 NOTE — TELEPHONE ENCOUNTER
The patient called to say the PCP and hematologist told him he is not in AF. No EKG was done to confirm. The patient wants to know if he still needs to try a different AAD as he is scheduled for a sotalol admit on 03/25/2024. Message sent to Dr. Wolf for advisement.     Electronically signed by Bryanna Amaral MA on 3/13/2024 at 3:08 PM

## 2024-03-13 NOTE — TELEPHONE ENCOUNTER
LM for patient to call the office.    Electronically signed by Bryanna Amaral MA on 3/13/2024 at 3:15 PM

## 2024-03-14 ENCOUNTER — TELEPHONE (OUTPATIENT)
Dept: NON INVASIVE DIAGNOSTICS | Age: 74
End: 2024-03-14

## 2024-03-14 ENCOUNTER — NURSE ONLY (OUTPATIENT)
Dept: NON INVASIVE DIAGNOSTICS | Age: 74
End: 2024-03-14
Payer: MEDICARE

## 2024-03-14 VITALS — DIASTOLIC BLOOD PRESSURE: 76 MMHG | OXYGEN SATURATION: 96 % | SYSTOLIC BLOOD PRESSURE: 124 MMHG | HEART RATE: 73 BPM

## 2024-03-14 DIAGNOSIS — I48.0 PAROXYSMAL ATRIAL FIBRILLATION (HCC): Primary | ICD-10-CM

## 2024-03-14 PROCEDURE — 93000 ELECTROCARDIOGRAM COMPLETE: CPT | Performed by: INTERNAL MEDICINE

## 2024-03-14 NOTE — TELEPHONE ENCOUNTER
----- Message from Kriss Wolf MD sent at 3/14/2024  1:47 PM EDT -----  EKG showed is hard to jillian whether he is in sinus rhythm or atrial tachycardia. It is up to him whether he wants to hold off AAD therapy or not but if he wants to proceed I prefer Tikosyn over Sotalol in his case. Thanks.   ----- Message -----  From: Anali Jolley MA  Sent: 3/14/2024   1:16 PM EDT  To: Kriss Wolf MD

## 2024-03-14 NOTE — TELEPHONE ENCOUNTER
Pt notified of results and verbalized understanding.  Patient wants to hold off on AAD therapy for now and follow up at next appointment. The patient will call if he becomes symptomatic. Admission cancelled.     Electronically signed by Bryanna Amaral MA on 3/14/2024 at 3:05 PM

## 2024-03-28 RX ORDER — FLECAINIDE ACETATE 100 MG/1
100 TABLET ORAL 2 TIMES DAILY
Qty: 180 TABLET | Refills: 3 | Status: SHIPPED | OUTPATIENT
Start: 2024-03-28

## 2024-06-03 ENCOUNTER — TELEPHONE (OUTPATIENT)
Dept: NON INVASIVE DIAGNOSTICS | Age: 74
End: 2024-06-03

## 2024-06-03 NOTE — TELEPHONE ENCOUNTER
The patient was notified the monitor results were unable to be interpreted due to scanning. The patient stopped flecainide a few months ago due to being scheduled for a Sotalol admission that he chose to cancel. The patient said he was feeling dizzy and poorly for about six weeks. He said he saw his PCP and since then he has not been dizzy in over 3 weeks. The patient was offered to set up Sotalol admission again and declined. He was offered to come in for an EKG and declined. The patient said he will wait until his ov on 6/25/2024 @ 9:15 AM to discuss further. Understanding verbalized.     Electronically signed by Bryanna Amaral MA on 6/3/2024 at 9:29 AM

## 2024-06-24 NOTE — PROGRESS NOTES
evidence of a hemodynamically significant pericardial effusion.      Aorta   Mildly dilated aortic root.      Conclusions      Summary   Normal left ventricular systolic function.   Ejection fraction is visually estimated at 55-60%.   Normal right ventricular size and function (TAPSE 1.8 cm).   Indeterminate diastolic function.   Bi-atrial enlargement.   Mild mitral regurgitation.   Mild aortic regurgitation.   Mild tricuspid regurgitation.   PASP is estimated at 33 mmHg.      Signature      ----------------------------------------------------------------   Electronically signed by Mor Cruz MD(Interpreting   physician) on 04/06/2023 04:43 PM   ----------------------------------------------------------------     Echocardiogram 4/6/2020:   No significant valvular abnormalities. Normal left ventricle size. Borderline concentric left ventricular hypertrophy. No wall motion abnormalities. Normal diastolic function. Ejection fraction is visually estimated at 65%.        Nuclear stress test 3/6/23:  FINDINGS: The overall quality of the study was excellent.      Left ventricular cavity size was noted to be normal.     Rotational analog analysis demonstrated no patient motion or abnormal extracardiac radioactivity.        The gated SPECT stress imaging in the short, vertical long, and horizontal long axis demonstrated normal homogeneous tracer distribution throughout the myocardium.          The resting images show no change.      Gated SPECT left ventricular ejection fraction was calculated to be 58%, with normal myocardial thickening and wall motion.     Impression:    ECG during the infusion did not change.   The myocardial perfusion imaging was normal.    Overall left ventricular systolic function was normal without regional wall motion abnormalities.  Low risk general pharmacologic nuclear stress test.     Thank you for sending your patient to this [facility] Nationally Accredited Facility.      Electronically signed by

## 2024-06-25 ENCOUNTER — OFFICE VISIT (OUTPATIENT)
Dept: NON INVASIVE DIAGNOSTICS | Age: 74
End: 2024-06-25
Payer: MEDICARE

## 2024-06-25 VITALS
RESPIRATION RATE: 17 BRPM | SYSTOLIC BLOOD PRESSURE: 132 MMHG | WEIGHT: 212.4 LBS | HEART RATE: 71 BPM | HEIGHT: 73 IN | OXYGEN SATURATION: 99 % | DIASTOLIC BLOOD PRESSURE: 84 MMHG | BODY MASS INDEX: 28.15 KG/M2

## 2024-06-25 DIAGNOSIS — I48.0 PAROXYSMAL ATRIAL FIBRILLATION (HCC): Primary | ICD-10-CM

## 2024-06-25 PROCEDURE — 1123F ACP DISCUSS/DSCN MKR DOCD: CPT | Performed by: INTERNAL MEDICINE

## 2024-06-25 PROCEDURE — 93000 ELECTROCARDIOGRAM COMPLETE: CPT | Performed by: INTERNAL MEDICINE

## 2024-06-25 PROCEDURE — 99215 OFFICE O/P EST HI 40 MIN: CPT | Performed by: INTERNAL MEDICINE

## 2024-06-25 PROCEDURE — 3079F DIAST BP 80-89 MM HG: CPT | Performed by: INTERNAL MEDICINE

## 2024-06-25 PROCEDURE — 3075F SYST BP GE 130 - 139MM HG: CPT | Performed by: INTERNAL MEDICINE

## 2024-07-14 LAB
MICROORGANISM SPEC CULT: ABNORMAL
MICROORGANISM SPEC CULT: ABNORMAL
SPECIMEN DESCRIPTION: ABNORMAL

## 2024-07-16 ENCOUNTER — TELEPHONE (OUTPATIENT)
Dept: NON INVASIVE DIAGNOSTICS | Age: 74
End: 2024-07-16

## 2024-07-16 NOTE — TELEPHONE ENCOUNTER
Called [facility] for Tikosyn and Sotalol pricing    Spoke with Perla Ref# [id number]    Estimated co pay for 90 day  Sotalol $0.00  Tikosyn $131.00

## 2024-07-16 NOTE — TELEPHONE ENCOUNTER
Spoke w/ pt to confirm stress test for Monday, 1/23/23 at 1130.  Instructions given and medications reviewed.  Pt advised to hold Toprol XL for 24 hrs prior to test.  All questions answered.    Well controlled on home regimen    PLAN  Continue home medication Ambien 10mg PRN

## 2024-10-03 NOTE — PROGRESS NOTES
[facility]- [facility] Electrophysiology  Outpatient Progress Note  Rodney Last  1950  Date of Service: 10/4/2024  PCP: Darien Cardoso MD  ATTENDING ELECTROPHYSIOLOGIST: Kriss Wolf MD         Subjective: Rodney Last is seen for follow-up and management of: AF/AFL.       Rodney Last is a 73 y.o. who is followed for paroxysmal atrial fibrillation flutter.  In spring 2023 the patient was scheduled for AF ablation but was found to have anemia thus the procedure was canceled.  He underwent EGD colonoscopy on 5/12/2023 showing polyp in involving the first portion of the duodenum, mild gastritis and normal colon.  On 07/26/2023 he presented to the hospital for elective cryoballoon ablation and CTI, RFA ablation and was restarted on Eliquis.  Postprocedure on 08/01/2023 he was noted to be back in AF thus he was started on amiodarone presented for cardioversion on 09/5/2023.  After starting amiodarone in September 2023 he was found to have amiodarone allergy 1 presenting to the hospital with chest pain and tremor.  He was also noted to have recurrent atrial fibrillation and underwent DCCV 10/4/2023 and was started on flecainide.  On 12/11/2023 presented for follow-up with Jaspal DILL at which time he was in sinus.  In February 2024 he wore a monitor that revealed 100% atrial flutter at which time he was offered Tikosyn/sotalol.  He also reported that he saw his hematologist during this time he noted he was in sinus rhythm however no EKG was to confirm this.  When he last saw Dr. Wolf he was offered Tikosyn/Sotalol or AF ablation.  TIkosyn was found to be 131$ for a 90 day supply. Today he presents in Atrial flutter with a V rate of 72 bpm. He notes ongoing lack of energy/fatigue and low activity tolerance/motivation.         Patient Active Problem List   Diagnosis    Hypertension    Type 2 diabetes mellitus without complication (HCC)

## 2024-10-04 ENCOUNTER — OFFICE VISIT (OUTPATIENT)
Dept: NON INVASIVE DIAGNOSTICS | Age: 74
End: 2024-10-04
Payer: MEDICARE

## 2024-10-04 VITALS
WEIGHT: 211.8 LBS | SYSTOLIC BLOOD PRESSURE: 140 MMHG | HEART RATE: 72 BPM | DIASTOLIC BLOOD PRESSURE: 72 MMHG | BODY MASS INDEX: 28.07 KG/M2 | RESPIRATION RATE: 16 BRPM | HEIGHT: 73 IN

## 2024-10-04 DIAGNOSIS — I48.0 PAROXYSMAL ATRIAL FIBRILLATION (HCC): Primary | ICD-10-CM

## 2024-10-04 PROCEDURE — 1123F ACP DISCUSS/DSCN MKR DOCD: CPT | Performed by: NURSE PRACTITIONER

## 2024-10-04 PROCEDURE — 3077F SYST BP >= 140 MM HG: CPT | Performed by: NURSE PRACTITIONER

## 2024-10-04 PROCEDURE — 99214 OFFICE O/P EST MOD 30 MIN: CPT | Performed by: NURSE PRACTITIONER

## 2024-10-04 PROCEDURE — 3078F DIAST BP <80 MM HG: CPT | Performed by: NURSE PRACTITIONER

## 2024-12-24 ENCOUNTER — TELEPHONE (OUTPATIENT)
Dept: NON INVASIVE DIAGNOSTICS | Age: 74
End: 2024-12-24

## 2024-12-24 NOTE — TELEPHONE ENCOUNTER
Patient notified and verbalized understanding.     Electronically signed by Bryanna Amaral MA on 12/24/2024 at 10:55 AM

## 2024-12-24 NOTE — TELEPHONE ENCOUNTER
Urology wants the patient to stop his OAC due to bleeding in his urine and the patient wants to know if he should stop his OAC. The patient was informed of this last night. The patient has an MRI scheduled on 12/30/2024 and a biopsy scheduled on 1/23/2025. I advised the patient to follow the instructions of the treating physician and I will send a message to Dr. Wolf for further advisement.     Electronically signed by Bryanna Amaral MA on 12/24/2024 at 10:07 AM

## 2025-01-23 ENCOUNTER — HOSPITAL ENCOUNTER (OUTPATIENT)
Age: 75
Discharge: HOME | End: 2025-01-25

## 2025-01-23 PROCEDURE — 82365 CALCULUS SPECTROSCOPY: CPT

## 2025-01-28 LAB
STONE COMPOSITION: NORMAL
STONE DESCRIPTION: NORMAL
STONE MASS: 36 MG

## 2025-01-30 LAB — SURGICAL PATHOLOGY REPORT: NORMAL

## 2025-02-11 NOTE — PROGRESS NOTES
[facility]- [facility]  Outpatient Progress Note  Rodney Last  1950  Date of Service: 2/11/2025  PCP: Darien Cardoso MD  ATTENDING ELECTROPHYSIOLOGIST: Kriss Wolf MD         Subjective: Rodney Last is seen for follow-up and management of: AF/AFL.       Rodney Last is a 74 y.o. who is followed for paroxysmal atrial fibrillation flutter.  In spring 2023 the patient was scheduled for AF ablation but was found to have anemia thus the procedure was canceled.  He underwent EGD colonoscopy on 5/12/2023 showing polyp in involving the first portion of the duodenum, mild gastritis and normal colon.  On 07/26/2023 he presented to the hospital for elective cryoballoon ablation and CTI, RFA ablation and was restarted on Eliquis.  Postprocedure on 08/01/2023 he was noted to be back in AF thus he was started on amiodarone presented for cardioversion on 09/5/2023.  After starting amiodarone in September 2023 he was found to have amiodarone allergy when presenting to the hospital with chest pain and tremor.  He was also noted to have recurrent atrial fibrillation and underwent DCCV 10/4/2023 and was started on flecainide.  On 12/11/2023 presented for follow-up with Jaspal DILL at which time he was in sinus.  In February 2024 he wore a monitor that revealed 100% atrial flutter at which time he was offered Tikosyn/sotalol.  He also reported that he saw his hematologist during this time he noted he was in sinus rhythm however no EKG was to confirm this.  He was seen in office on 10/04/2024 and was offered Tikosyn/Sotalol and  did not purse. Today he he notes ongoing activity intolerance, as evident by previously he would hunt and be more active however this year since ongoing flutter has been relatively sedentary.  He has no specific complaints of dyspnea upon exertion, syncope, near syncope orthopnea PND, feelings of heart racing.  After

## 2025-02-13 ENCOUNTER — OFFICE VISIT (OUTPATIENT)
Dept: NON INVASIVE DIAGNOSTICS | Age: 75
End: 2025-02-13
Payer: MEDICARE

## 2025-02-13 VITALS
SYSTOLIC BLOOD PRESSURE: 110 MMHG | DIASTOLIC BLOOD PRESSURE: 74 MMHG | OXYGEN SATURATION: 97 % | WEIGHT: 212.6 LBS | TEMPERATURE: 97.5 F | BODY MASS INDEX: 29.76 KG/M2 | HEIGHT: 71 IN | HEART RATE: 64 BPM | RESPIRATION RATE: 18 BRPM

## 2025-02-13 DIAGNOSIS — I48.0 PAROXYSMAL ATRIAL FIBRILLATION (HCC): Primary | ICD-10-CM

## 2025-02-13 PROCEDURE — 3074F SYST BP LT 130 MM HG: CPT | Performed by: NURSE PRACTITIONER

## 2025-02-13 PROCEDURE — 93000 ELECTROCARDIOGRAM COMPLETE: CPT | Performed by: NURSE PRACTITIONER

## 2025-02-13 PROCEDURE — 3078F DIAST BP <80 MM HG: CPT | Performed by: NURSE PRACTITIONER

## 2025-02-13 PROCEDURE — 1123F ACP DISCUSS/DSCN MKR DOCD: CPT | Performed by: NURSE PRACTITIONER

## 2025-02-13 PROCEDURE — 99215 OFFICE O/P EST HI 40 MIN: CPT | Performed by: NURSE PRACTITIONER

## 2025-02-13 NOTE — PATIENT INSTRUCTIONS
Plan 1. Start Eliquis today    1. A referral to the Watchman clinic for potential Watchman placement.   2. Start Flecainide 100mg twice daily on 03/06/2025.   3. Shock the heart back into rhythm in 2-3 weeks after starting Flecainide.   4. Evaluate and see if redo ablation would be beneficial.

## 2025-02-14 ENCOUNTER — TELEPHONE (OUTPATIENT)
Dept: NON INVASIVE DIAGNOSTICS | Age: 75
End: 2025-02-14

## 2025-02-14 NOTE — TELEPHONE ENCOUNTER
----- Message from Kvng Alcocer, ANIYAH - CNP sent at 2/13/2025  3:15 PM EST -----  Rodney Last was seen in office today. I spoke with Dr. Wolf and the patient is resuming Eliquis today, thus in 3 weeks or greater he will need Tikosyn admission, please check the cost of Tiksoyn for him as well. Thanks.

## 2025-02-14 NOTE — TELEPHONE ENCOUNTER
The patient will be contacted to schedule the Tikosyn admission and pricing well be checked as well.     Electronically signed by Bryanna Amaral MA on 2/14/2025 at 1:59 PM

## 2025-02-20 ENCOUNTER — TELEPHONE (OUTPATIENT)
Dept: NON INVASIVE DIAGNOSTICS | Age: 75
End: 2025-02-20

## 2025-02-20 NOTE — TELEPHONE ENCOUNTER
Patient contacted office indicating that he spoke with his urologist and they want him to stay off his blood thinner for another week.

## 2025-02-24 NOTE — TELEPHONE ENCOUNTER
Patient returned our call and was given recommendations per CONY Coronado.  He states he will begin his Eliquis again on 3/2/2025.

## 2025-02-26 ENCOUNTER — TELEPHONE (OUTPATIENT)
Dept: NON INVASIVE DIAGNOSTICS | Age: 75
End: 2025-02-26

## 2025-02-26 NOTE — TELEPHONE ENCOUNTER
----- Message from STACY FREEMAN MA sent at 2/14/2025  1:57 PM EST -----  Regarding: Pricing and schedule  Luisa can you check the cost of Tikosyn?     Emani can you schedule the med admit?    Thank you,    Electronically signed by Bryanna Amaral MA on 2/14/2025 at 1:58 PM  ----- Message -----  From: Kvng Alcocer, ANIYAH - CNP  Sent: 2/13/2025   3:22 PM EST  To: Bryanna Amaral MA    Rodney Last was seen in office today. I spoke with Dr. Wolf and the patient is resuming Eliquis today, thus in 3 weeks or greater he will need Tikosyn admission, please check the cost of Tiksoyn for him as well. Thanks.

## 2025-02-26 NOTE — TELEPHONE ENCOUNTER
Spoke with Kalpana at [facility]    Estimated Copay for Dofetilide     90 day Mail order 223.13  90 day local 62.56  30 day local 24.08    Ref# [id number]

## 2025-02-26 NOTE — TELEPHONE ENCOUNTER
MANUEL to call the office.     Electronically signed by Bryanna Amaral MA on 2/26/2025 at 9:24 AM

## 2025-02-26 NOTE — TELEPHONE ENCOUNTER
I spoke with the patient about his AF management options. The patient plans to resume Eliquis tomorrow. The patient was given the pricing for the dofetilide (Tikosyn). The patient had a few questions about his AF management options that I sent to Dr. Wolf. The patient prefers to redo the AF Ablation over starting an additional medication. He does not want to do either if he does not have to. The patient also asked if he needs to be referred for a possible Watchman due to his bleeding issues while on Eliquis. I advised the patient to resume Eliquis as planned and to call the office next week, or sooner if needed, to decide what route to take after more information is back from Dr. Wolf. The patient verbalized understanding.     I held a spot for his Tikosyn admit on 3/25/25 just in case he chooses to proceed.     Electronically signed by Bryanna Amaral MA on 2/26/2025 at 12:04 PM

## 2025-03-04 ENCOUNTER — TELEPHONE (OUTPATIENT)
Dept: NON INVASIVE DIAGNOSTICS | Age: 75
End: 2025-03-04

## 2025-03-04 NOTE — TELEPHONE ENCOUNTER
I spoke with the patient today and provided the recommendations per Dr. Wolf. The patient is not tolerating Eliquis and has an appointment with his Urologist tomorrow. The patient wants to hold off on the Tikosyn admit for now until he figures out what to do with the blood thinner. The patient asked if there is another medication he can be put on besides the Eliquis to avoid continuous bleeding issues. The patient also asked when he should follow up with Dr. Wolf. I advised the patient, I will reach out to Dr. Wolf and get back to him. The patient verbalized understanding.     Electronically signed by Bryanna Amaral MA on 3/4/2025 at 12:28 PM

## 2025-03-05 ENCOUNTER — TELEPHONE (OUTPATIENT)
Dept: NON INVASIVE DIAGNOSTICS | Age: 75
End: 2025-03-05

## 2025-03-05 DIAGNOSIS — I48.0 PAROXYSMAL ATRIAL FIBRILLATION (HCC): Primary | ICD-10-CM

## 2025-03-05 NOTE — TELEPHONE ENCOUNTER
The patient was provided with alternative medications per Dr. Wolf (Xarelto, Pradaxa, and Warfarin). Dr. Wolf also said if the patient wants to be referred for possible Watchman he can. The patient requested pricing on the alternate medications. I sent a message to Luisa to check. The referral for Watchman was placed per the patient's request. I advised the patient, I will call him with the price information once received and advised we will schedule a follow up office visit once all other appointments are completed. The patient verbalized understanding.     Electronically signed by Bryanna Amaral MA on 3/5/2025 at 10:34 AM

## 2025-03-06 ENCOUNTER — TELEPHONE (OUTPATIENT)
Age: 75
End: 2025-03-06

## 2025-03-07 ENCOUNTER — TELEPHONE (OUTPATIENT)
Dept: NON INVASIVE DIAGNOSTICS | Age: 75
End: 2025-03-07

## 2025-03-07 NOTE — TELEPHONE ENCOUNTER
[facility] spoke with Alexandria Mueller Copay    Xarelto   30 day  $42.00  90 day $118.00    Pradaxa not covered  Dabigatran   30 day $93.47  90 day $279.73    Warfarin   30 or 90 day $0.00    Ref# [id number]

## 2025-03-10 ENCOUNTER — TELEPHONE (OUTPATIENT)
Dept: NON INVASIVE DIAGNOSTICS | Age: 75
End: 2025-03-10

## 2025-03-10 NOTE — TELEPHONE ENCOUNTER
Patient notified and will call me back once he determines if [facility] will do a 30 day supply to try it before doing 90 day scripts.     Electronically signed by Bryanna Amaral MA on 3/10/2025 at 9:53 AM

## 2025-03-10 NOTE — TELEPHONE ENCOUNTER
I returned the patient's VM but he did not answer and no VM option was available.     Electronically signed by Bryanna Amaral MA on 3/10/2025 at 1:55 PM

## 2025-03-12 ENCOUNTER — TELEPHONE (OUTPATIENT)
Dept: NON INVASIVE DIAGNOSTICS | Age: 75
End: 2025-03-12

## 2025-03-12 NOTE — TELEPHONE ENCOUNTER
The patient called back and wants to switch to Xarelto. The patient requested a 90 day supply be sent to [facility]. The patient also wants to know how soon he can have an ablation and when he should follow up next. Message sent to NP for further advisement.     Electronically signed by Bryanna Amaral MA on 3/12/2025 at 9:48 AM

## 2025-03-13 ENCOUNTER — TELEPHONE (OUTPATIENT)
Dept: NON INVASIVE DIAGNOSTICS | Age: 75
End: 2025-03-13

## 2025-03-13 DIAGNOSIS — I48.0 PAROXYSMAL ATRIAL FIBRILLATION (HCC): ICD-10-CM

## 2025-03-13 DIAGNOSIS — I48.0 PAROXYSMAL ATRIAL FIBRILLATION (HCC): Primary | ICD-10-CM

## 2025-03-13 LAB
ALBUMIN: 4.5 G/DL (ref 3.5–5.2)
ALP BLD-CCNC: 85 U/L (ref 40–129)
ALT SERPL-CCNC: 32 U/L (ref 0–40)
ANION GAP SERPL CALCULATED.3IONS-SCNC: 16 MMOL/L (ref 7–16)
AST SERPL-CCNC: 32 U/L (ref 0–39)
BASOPHILS ABSOLUTE: 0.03 K/UL (ref 0–0.2)
BASOPHILS RELATIVE PERCENT: 1 % (ref 0–2)
BILIRUB SERPL-MCNC: 0.5 MG/DL (ref 0–1.2)
BUN BLDV-MCNC: 17 MG/DL (ref 6–23)
CALCIUM SERPL-MCNC: 10.5 MG/DL (ref 8.6–10.2)
CHLORIDE BLD-SCNC: 102 MMOL/L (ref 98–107)
CO2: 22 MMOL/L (ref 22–29)
CREAT SERPL-MCNC: 0.8 MG/DL (ref 0.7–1.2)
EOSINOPHILS ABSOLUTE: 0.28 K/UL (ref 0.05–0.5)
EOSINOPHILS RELATIVE PERCENT: 5 % (ref 0–6)
GFR, ESTIMATED: >90 ML/MIN/1.73M2
GLUCOSE BLD-MCNC: 100 MG/DL (ref 74–99)
HCT VFR BLD CALC: 48.1 % (ref 37–54)
HEMOGLOBIN: 15.3 G/DL (ref 12.5–16.5)
IMMATURE GRANULOCYTES %: 1 % (ref 0–5)
IMMATURE GRANULOCYTES ABSOLUTE: 0.03 K/UL (ref 0–0.58)
LYMPHOCYTES ABSOLUTE: 2.09 K/UL (ref 1.5–4)
LYMPHOCYTES RELATIVE PERCENT: 33 % (ref 20–42)
MCH RBC QN AUTO: 28.9 PG (ref 26–35)
MCHC RBC AUTO-ENTMCNC: 31.8 G/DL (ref 32–34.5)
MCV RBC AUTO: 90.9 FL (ref 80–99.9)
MONOCYTES ABSOLUTE: 0.72 K/UL (ref 0.1–0.95)
MONOCYTES RELATIVE PERCENT: 12 % (ref 2–12)
NEUTROPHILS ABSOLUTE: 3.1 K/UL (ref 1.8–7.3)
NEUTROPHILS RELATIVE PERCENT: 50 % (ref 43–80)
PDW BLD-RTO: 14.6 % (ref 11.5–15)
PLATELET # BLD: 164 K/UL (ref 130–450)
PMV BLD AUTO: 12.1 FL (ref 7–12)
POTASSIUM SERPL-SCNC: 5 MMOL/L (ref 3.5–5)
RBC # BLD: 5.29 M/UL (ref 3.8–5.8)
SODIUM BLD-SCNC: 140 MMOL/L (ref 132–146)
TOTAL PROTEIN: 7.2 G/DL (ref 6.4–8.3)
WBC # BLD: 6.3 K/UL (ref 4.5–11.5)

## 2025-03-13 NOTE — TELEPHONE ENCOUNTER
The patient called back for a follow up response. The patient has not been taking an OAC for over two weeks. I was not made aware of this information initially. I will follow up and get back to the patient.     Electronically signed by Bryanna Amaral MA on 3/13/2025 at 10:12 AM

## 2025-03-13 NOTE — TELEPHONE ENCOUNTER
MANUEL to call the office.    Per Dr. Wolf,  The patient needs to have a CBC and CMP done prior to being prescribed Xarelto.     The patient will need to be on an OAC for a minimum of 3 weeks before he can have an ablation done.     The patient can follow up in 6-8 weeks.       Electronically signed by Bryanna Amaral MA on 3/13/2025 at 11:19 AM    \"Kriss Wolf MD Smith, STACY Gould  He needs CBC and CMP before we can prescribed Xarelto and he needs to be on for at least 3 weeks before the ablation can be done. Thanks.\"

## 2025-03-13 NOTE — TELEPHONE ENCOUNTER
----- Message from Dr. Kriss Wolf MD sent at 3/13/2025 10:56 AM EDT -----  Can be seen in 6 to 8 weeks. Thanks.  ----- Message -----  From: Bryanna Amaral MA  Sent: 3/13/2025  10:14 AM EDT  To: Kriss Wolf MD    The patient called back and wants to switch to Xarelto.     The patient also wants to know how soon he can have an ablation and when he should follow up next. Please advise.     Electronically signed by Bryanna Amaral MA on 3/13/2025 at 10:13 AM

## 2025-03-13 NOTE — TELEPHONE ENCOUNTER
Resolved in another encounter by Dr. Wolf.     Electronically signed by Bryanna Amaral MA on 3/13/2025 at 11:27 AM

## 2025-03-13 NOTE — TELEPHONE ENCOUNTER
Patient notified of recommendations per Dr. Wolf and will get the lab work done in [place] and go from there.     Electronically signed by Bryanna Amaral MA on 3/13/2025 at 11:26 AM

## 2025-03-14 ENCOUNTER — RESULTS FOLLOW-UP (OUTPATIENT)
Dept: NON INVASIVE DIAGNOSTICS | Age: 75
End: 2025-03-14

## 2025-03-14 NOTE — TELEPHONE ENCOUNTER
The patient was advised of the medication recommendation. [facility] and 6-8 week ov has been scheduled with [facility], per patient request.     Electronically signed by Bryanna Amaral MA on 3/14/2025 at 1:22 PM

## 2025-03-14 NOTE — TELEPHONE ENCOUNTER
----- Message from Dr. Kriss Wolf MD sent at 3/14/2025  7:15 AM EDT -----  Can be started on Xarelto 20 mg daily. Thanks.  ----- Message -----  From: [id number] - [facility] Results From [place]  Sent: 3/13/2025   9:53 PM EDT  To: Kriss Wolf MD

## 2025-03-14 NOTE — TELEPHONE ENCOUNTER
----- Message from Dr. Kriss Wolf MD sent at 3/14/2025  7:15 AM EDT -----  Can be started on Xarelto 20 mg daily. Thanks.  ----- Message -----  From: [id number] - [facility] Results From [place]  Sent: 3/13/2025   9:53 PM EDT  To: Kriss Wolf MD

## 2025-03-21 ENCOUNTER — TELEPHONE (OUTPATIENT)
Dept: NON INVASIVE DIAGNOSTICS | Age: 75
End: 2025-03-21

## 2025-03-21 NOTE — TELEPHONE ENCOUNTER
Patient phone call was returned o let him know his medication [facility]arel[facility] 20mg was [facility] . Patient agreed and understood.

## 2025-04-01 ENCOUNTER — OFFICE VISIT (OUTPATIENT)
Dept: CARDIOLOGY CLINIC | Age: 75
End: 2025-04-01
Payer: MEDICARE

## 2025-04-01 VITALS
SYSTOLIC BLOOD PRESSURE: 130 MMHG | DIASTOLIC BLOOD PRESSURE: 76 MMHG | RESPIRATION RATE: 16 BRPM | HEIGHT: 73 IN | WEIGHT: 213 LBS | BODY MASS INDEX: 28.23 KG/M2 | HEART RATE: 69 BPM

## 2025-04-01 DIAGNOSIS — I48.19 PERSISTENT ATRIAL FIBRILLATION (HCC): Primary | ICD-10-CM

## 2025-04-01 DIAGNOSIS — I48.3 TYPICAL ATRIAL FLUTTER (HCC): ICD-10-CM

## 2025-04-01 PROCEDURE — 3078F DIAST BP <80 MM HG: CPT | Performed by: INTERNAL MEDICINE

## 2025-04-01 PROCEDURE — 1123F ACP DISCUSS/DSCN MKR DOCD: CPT | Performed by: INTERNAL MEDICINE

## 2025-04-01 PROCEDURE — 93000 ELECTROCARDIOGRAM COMPLETE: CPT | Performed by: INTERNAL MEDICINE

## 2025-04-01 PROCEDURE — 99205 OFFICE O/P NEW HI 60 MIN: CPT | Performed by: INTERNAL MEDICINE

## 2025-04-01 PROCEDURE — 1159F MED LIST DOCD IN RCRD: CPT | Performed by: INTERNAL MEDICINE

## 2025-04-01 PROCEDURE — 3075F SYST BP GE 130 - 139MM HG: CPT | Performed by: INTERNAL MEDICINE

## 2025-04-01 ASSESSMENT — ENCOUNTER SYMPTOMS
BACK PAIN: 0
CHEST TIGHTNESS: 0
VOMITING: 0
BLOOD IN STOOL: 0
SHORTNESS OF BREATH: 0
ABDOMINAL PAIN: 0
NAUSEA: 0
COUGH: 0

## 2025-04-01 NOTE — PROGRESS NOTES
OFFICE VISIT    NAME: Rodney Last     YOB: 1950   AGE: 74 y.o.   MEDICAL RECORD NUMBER: [id number]       CONSULTATION INFORMATION:   DATE OF CLINIC CONSULTATION: 4/1/2025   PRINCIPLE DIAGNOSIS / reason for visit: LAAO    CARE TEAM MEMBERS    PCP : Darien Cardoso MD     PRIMARY CARDIOLOGIST: Dr. Wolf   REFERRING PROVIDER: Kriss Wolf MD     HISTORY OF PRESENT ILLNESS:   Rodney Last is a 74 y.o. male referred by Dr. Wolf for LAAO. Past medical history of persistent atrial fibrillation and a flutter, RBBB, HTN, HLD, DM2, DALTON, nephrolithiasis (causing urinary bleed when on OAC), GI bleed (Dx 4/17/2023, EGD performed 4/18/2023 and 5/12/2023)        Presents to clinic today with his wife.  He reports that he is not able to tolerate blood thinners including Eliquis and Xarelto due to urinary bleed mainly.  Every time he takes Eliquis or Xarelto he has been having blood in the urine.  He has history of nephrolithiasis and ureteral calculi and underwent lithotripsy in the past along with stenting and he follows with a urologist (Dr. Gutierrez). His ureteral stent was removed.  He is currently not on any blood thinners.      -TTE 4/6/2023:   Summary   Normal left ventricular systolic function.   Ejection fraction is visually estimated at 55-60%.   Normal right ventricular size and function (TAPSE 1.8 cm).   Indeterminate diastolic function.   Bi-atrial enlargement.   Mild mitral regurgitation.   Mild aortic regurgitation.   Mild tricuspid regurgitation.   PASP is estimated at 33 mmHg.    -ECG 4/1/2025:  Atrial fibrillation    PAST HISTORY:   Past Medical History:   Diagnosis Date    Arthritis     Atrial flutter (HCC)     a fib    Cancer (HCC)     skin    Diabetes mellitus (HCC)     prediabetic    History of blood transfusion     Hyperlipidemia     Hypertension     Neoplasm of duodenum     GI Bleed    DALTON (obstructive sleep apnea)     no cpap    RBBB     Thyroid disease

## 2025-04-18 ENCOUNTER — TELEPHONE (OUTPATIENT)
Dept: CARDIOLOGY CLINIC | Age: 75
End: 2025-04-18

## 2025-04-18 DIAGNOSIS — I48.19 PERSISTENT ATRIAL FIBRILLATION (HCC): Primary | ICD-10-CM

## 2025-04-18 DIAGNOSIS — Z91.89 AT HIGH RISK FOR BLEEDING: ICD-10-CM

## 2025-04-18 NOTE — TELEPHONE ENCOUNTER
Nisha AMEZQUITA from Dr. Kalyan Gutierrez's office called to inform me that Dr. SANDEE Gutierrez cleared pt to receive ASA & Plavix for 6 months post Watchman implant.

## 2025-04-18 NOTE — TELEPHONE ENCOUNTER
Rodney called me yesterday to inform me that he is tolerating the 2 week trial of ASA & Plavix.  He said that he did have hematuria for a few hours after one week; but it stopped. He said he saw his urologist; Dr.Vince Gutierrez, & that Dr. Gutierrez  said that he can proceed with the Watchman. I will get clearance from Dr. Gutierrez & scan it into media.

## 2025-04-22 RX ORDER — CLOPIDOGREL BISULFATE 75 MG/1
75 TABLET ORAL DAILY
Qty: 30 TABLET | Refills: 1 | Status: SHIPPED | OUTPATIENT
Start: 2025-04-22

## 2025-04-23 ENCOUNTER — TELEPHONE (OUTPATIENT)
Dept: CARDIOLOGY CLINIC | Age: 75
End: 2025-04-23

## 2025-04-23 ENCOUNTER — TELEPHONE (OUTPATIENT)
Dept: NON INVASIVE DIAGNOSTICS | Age: 75
End: 2025-04-23

## 2025-04-23 NOTE — TELEPHONE ENCOUNTER
WATCHMAN CTA INSTRUCTIONS:    I called Rodney to go over his Watchman CTA Instructions  Location: [facility]  Date & Time: 5/1/25 at 12:00 pm  NPO after 8:00 am  Arrival Time: 9:30 am for lab work and pre-hydration  May take am medications with a sip of water  Metformin: Take your last dose on  4/30 & hold it for 2 days after the CTA & resume it on 5/4   More detailed instructions & lab work orders mailed to patient and will be scanned into Epic.

## 2025-04-23 NOTE — TELEPHONE ENCOUNTER
HUMA²DS²-VASc Score: 4 (age, HTN, DM)  HAS-BLED Score: 2 (age, bleeding)     Rodney Last has Persistent atrial fibrillation and atrial flutter and inability to tolerate OAC 2/2 urinary bleed.      We had an extensive discussion regarding the rationale, risks, benefits, and alternatives for seeking left atrial appendage closure with the Watchman device.  Following an open discussion and shared decision-making process utilizing the ACC/HRS published algorithm, this was used to help the patient better understand treatment options and choose the most appropriate treatment course which is left atrial appendage closure.  The long-term reason that oral anticoagulation is deemed unsuitable long term is due to an increased risk of urinary bleeding.      He had a full discussion with the implanting physician regarding the periprocedural risks and long-term implications of the procedure.

## 2025-05-01 ENCOUNTER — HOSPITAL ENCOUNTER (OUTPATIENT)
Age: 75
Discharge: HOME | End: 2025-05-01
Payer: MEDICARE

## 2025-05-01 ENCOUNTER — HOSPITAL ENCOUNTER (OUTPATIENT)
Dept: CT IMAGING | Age: 75
Discharge: HOME | End: 2025-05-03
Attending: INTERNAL MEDICINE
Payer: MEDICARE

## 2025-05-01 DIAGNOSIS — Z91.89 AT HIGH RISK FOR BLEEDING: ICD-10-CM

## 2025-05-01 DIAGNOSIS — I48.19 PERSISTENT ATRIAL FIBRILLATION (HCC): ICD-10-CM

## 2025-05-01 LAB
ANION GAP SERPL CALCULATED.3IONS-SCNC: 9 MMOL/L (ref 7–16)
BUN SERPL-MCNC: 20 MG/DL (ref 8–23)
CALCIUM SERPL-MCNC: 10.1 MG/DL (ref 8.8–10.2)
CHLORIDE SERPL-SCNC: 104 MMOL/L (ref 98–107)
CO2 SERPL-SCNC: 27 MMOL/L (ref 22–29)
CREAT SERPL-MCNC: 0.9 MG/DL (ref 0.7–1.2)
GFR, ESTIMATED: 89 ML/MIN/1.73M2
GLUCOSE SERPL-MCNC: 117 MG/DL (ref 74–99)
POTASSIUM SERPL-SCNC: 4.7 MMOL/L (ref 3.5–5.1)
SODIUM SERPL-SCNC: 139 MMOL/L (ref 136–145)

## 2025-05-01 PROCEDURE — 6360000004 HC RX CONTRAST MEDICATION: Performed by: RADIOLOGY

## 2025-05-01 PROCEDURE — 36415 COLL VENOUS BLD VENIPUNCTURE: CPT

## 2025-05-01 PROCEDURE — 2580000003 HC RX 258: Performed by: INTERNAL MEDICINE

## 2025-05-01 PROCEDURE — 75572 CT HRT W/3D IMAGE: CPT

## 2025-05-01 PROCEDURE — 80048 BASIC METABOLIC PNL TOTAL CA: CPT

## 2025-05-01 RX ORDER — IOPAMIDOL 755 MG/ML
75 INJECTION, SOLUTION INTRAVASCULAR
Status: COMPLETED | OUTPATIENT
Start: 2025-05-01 | End: 2025-05-01

## 2025-05-01 RX ORDER — 0.9 % SODIUM CHLORIDE 0.9 %
500 INTRAVENOUS SOLUTION INTRAVENOUS ONCE
Status: COMPLETED | OUTPATIENT
Start: 2025-05-01 | End: 2025-05-01

## 2025-05-01 RX ADMIN — IOPAMIDOL 75 ML: 755 INJECTION, SOLUTION INTRAVENOUS at 10:53

## 2025-05-01 RX ADMIN — SODIUM CHLORIDE 500 ML: 0.9 INJECTION, SOLUTION INTRAVENOUS at 09:48

## 2025-05-14 ENCOUNTER — TELEPHONE (OUTPATIENT)
Dept: NON INVASIVE DIAGNOSTICS | Age: 75
End: 2025-05-14

## 2025-05-14 NOTE — TELEPHONE ENCOUNTER
Pt called to cancel appt. Pt stated that he's getting a watchman soon on 06/02/2025 and don't think this appt is needed.

## 2025-05-21 ENCOUNTER — TELEPHONE (OUTPATIENT)
Dept: CARDIOLOGY CLINIC | Age: 75
End: 2025-05-21

## 2025-05-21 DIAGNOSIS — Z01.818 PREOP TESTING: ICD-10-CM

## 2025-05-21 DIAGNOSIS — I48.21 PERMANENT ATRIAL FIBRILLATION (HCC): Primary | ICD-10-CM

## 2025-05-21 DIAGNOSIS — Z91.89 AT HIGH RISK FOR BLEEDING: ICD-10-CM

## 2025-05-21 NOTE — TELEPHONE ENCOUNTER
WATCHMAN SHARED DECISION MAKING     Watchman Shared Decision Making was done by Dr. Wolf on  4/23/25     See his telephone encounter on 4/23/25 that states the following      \"HUMA²DS²-VASc Score: 4 (age, HTN, DM)  HAS-BLED Score: 2 (age, bleeding)     Rodney Last has Persistent atrial fibrillation and atrial flutter and inability to tolerate OAC 2/2 urinary bleed.      We had an extensive discussion regarding the rationale, risks, benefits, and alternatives for seeking left atrial appendage closure with the Watchman device.  Following an open discussion and shared decision-making process utilizing the ACC/HRS published algorithm, this was used to help the patient better understand treatment options and choose the most appropriate treatment course which is left atrial appendage closure.  The long-term reason that oral anticoagulation is deemed unsuitable long term is due to an increased risk of urinary bleeding.      He had a full discussion with the implanting physician regarding the periprocedural risks and long-term implications of the procedure. \"          Shared Decision Making Tool    Rodney was given a [employer] informational packet that contained the ACC CardioSmart Decision Aid for AFib Stroke Prevention for Patients with Atrial fibrillation during his  her visit richard/ Chaya on 4/1/25.

## 2025-05-23 ENCOUNTER — TELEPHONE (OUTPATIENT)
Dept: CARDIOLOGY CLINIC | Age: 75
End: 2025-05-23

## 2025-05-23 DIAGNOSIS — I48.19 PERSISTENT ATRIAL FIBRILLATION (HCC): Primary | ICD-10-CM

## 2025-05-23 DIAGNOSIS — Z91.89 AT HIGH RISK FOR BLEEDING: ICD-10-CM

## 2025-05-23 DIAGNOSIS — Z01.818 PREOP TESTING: ICD-10-CM

## 2025-05-23 NOTE — TELEPHONE ENCOUNTER
DOREEN PREOP INSTRUCTIONS     I called Rodney & reviewed his Preop Doreen Instructions   Preop Labs to be drawn a few days before at a [facility]   NPO post midnight the night before the procedure   Date & Time of Watchman procedure: 6/2/25 at 7:30 am  Arrival Time: 6:00 am  Take the following meds am of procedure: Pantoprazole, Metoprolol, & Amlodipine  Hold all other medications   Take the last dose of Vitamins & Herbal Supplements 5 days before the procedure on 5/27  OAC Instructions: N/A   Metformin: Take your last dose on 6/1 & hold it for 2 days after the cath & resume it on 6/5  More detailed instructions and lab orders mailed to pt & sent to him on his phone and will be scanned into Epic.

## 2025-05-28 ENCOUNTER — HOSPITAL ENCOUNTER (OUTPATIENT)
Age: 75
Discharge: HOME | End: 2025-05-28
Payer: MEDICARE

## 2025-05-28 DIAGNOSIS — I48.19 PERSISTENT ATRIAL FIBRILLATION (HCC): ICD-10-CM

## 2025-05-28 DIAGNOSIS — Z91.89 AT HIGH RISK FOR BLEEDING: ICD-10-CM

## 2025-05-28 DIAGNOSIS — Z01.818 PREOP TESTING: ICD-10-CM

## 2025-05-28 DIAGNOSIS — I48.21 PERMANENT ATRIAL FIBRILLATION (HCC): ICD-10-CM

## 2025-05-28 LAB
ANION GAP SERPL CALCULATED.3IONS-SCNC: 11 MMOL/L (ref 7–16)
BASOPHILS # BLD: 0.03 K/UL (ref 0–0.2)
BASOPHILS NFR BLD: 1 % (ref 0–2)
BUN SERPL-MCNC: 21 MG/DL (ref 6–23)
CALCIUM SERPL-MCNC: 9.9 MG/DL (ref 8.6–10.2)
CHLORIDE SERPL-SCNC: 103 MMOL/L (ref 98–107)
CO2 SERPL-SCNC: 24 MMOL/L (ref 22–29)
CREAT SERPL-MCNC: 0.9 MG/DL (ref 0.7–1.2)
EOSINOPHIL # BLD: 0.42 K/UL (ref 0.05–0.5)
EOSINOPHILS RELATIVE PERCENT: 8 % (ref 0–6)
ERYTHROCYTE [DISTWIDTH] IN BLOOD BY AUTOMATED COUNT: 14.5 % (ref 11.5–15)
GFR, ESTIMATED: 89 ML/MIN/1.73M2
GLUCOSE SERPL-MCNC: 107 MG/DL (ref 74–99)
HCT VFR BLD AUTO: 43.3 % (ref 37–54)
HGB BLD-MCNC: 14.4 G/DL (ref 12.5–16.5)
IMM GRANULOCYTES # BLD AUTO: <0.03 K/UL (ref 0–0.58)
IMM GRANULOCYTES NFR BLD: 0 % (ref 0–5)
INR PPP: 1.1
LYMPHOCYTES NFR BLD: 1.44 K/UL (ref 1.5–4)
LYMPHOCYTES RELATIVE PERCENT: 29 % (ref 20–42)
MCH RBC QN AUTO: 29.1 PG (ref 26–35)
MCHC RBC AUTO-ENTMCNC: 33.3 G/DL (ref 32–34.5)
MCV RBC AUTO: 87.7 FL (ref 80–99.9)
MONOCYTES NFR BLD: 0.56 K/UL (ref 0.1–0.95)
MONOCYTES NFR BLD: 11 % (ref 2–12)
NEUTROPHILS NFR BLD: 51 % (ref 43–80)
NEUTS SEG NFR BLD: 2.54 K/UL (ref 1.8–7.3)
PLATELET, FLUORESCENCE: 129 K/UL (ref 130–450)
PMV BLD AUTO: 11.1 FL (ref 7–12)
POTASSIUM SERPL-SCNC: 5.4 MMOL/L (ref 3.5–5)
PROTHROMBIN TIME: 11.6 SEC (ref 9.3–12.4)
RBC # BLD AUTO: 4.94 M/UL (ref 3.8–5.8)
SODIUM SERPL-SCNC: 138 MMOL/L (ref 132–146)
WBC OTHER # BLD: 5 K/UL (ref 4.5–11.5)

## 2025-05-28 PROCEDURE — 85610 PROTHROMBIN TIME: CPT

## 2025-05-28 PROCEDURE — 85025 COMPLETE CBC W/AUTO DIFF WBC: CPT

## 2025-05-28 PROCEDURE — 80048 BASIC METABOLIC PNL TOTAL CA: CPT

## 2025-05-28 PROCEDURE — 36415 COLL VENOUS BLD VENIPUNCTURE: CPT

## 2025-06-01 ENCOUNTER — ANESTHESIA EVENT (OUTPATIENT)
Age: 75
DRG: 274 | End: 2025-06-01
Payer: MEDICARE

## 2025-06-02 ENCOUNTER — APPOINTMENT (OUTPATIENT)
Age: 75
DRG: 274 | End: 2025-06-02
Attending: INTERNAL MEDICINE
Payer: MEDICARE

## 2025-06-02 ENCOUNTER — ANESTHESIA (OUTPATIENT)
Age: 75
DRG: 274 | End: 2025-06-02
Payer: MEDICARE

## 2025-06-02 ENCOUNTER — HOSPITAL ENCOUNTER (INPATIENT)
Age: 75
LOS: 1 days | Discharge: HOME | DRG: 274 | End: 2025-06-03
Attending: INTERNAL MEDICINE | Admitting: INTERNAL MEDICINE
Payer: MEDICARE

## 2025-06-02 DIAGNOSIS — I48.11 LONGSTANDING PERSISTENT ATRIAL FIBRILLATION (HCC): ICD-10-CM

## 2025-06-02 DIAGNOSIS — Z95.818 PRESENCE OF WATCHMAN LEFT ATRIAL APPENDAGE CLOSURE DEVICE: ICD-10-CM

## 2025-06-02 DIAGNOSIS — I48.91 ATRIAL FIBRILLATION, UNSPECIFIED TYPE (HCC): Primary | ICD-10-CM

## 2025-06-02 DIAGNOSIS — I48.19 PERSISTENT ATRIAL FIBRILLATION (HCC): Primary | ICD-10-CM

## 2025-06-02 LAB
ACTIVATED CLOTTING TIME, LOW RANGE: 163 SEC
ACTIVATED CLOTTING TIME, LOW RANGE: 169 SEC
ACTIVATED CLOTTING TIME, LOW RANGE: 251 SEC
ACTIVATED CLOTTING TIME, LOW RANGE: 281 SEC
ACTIVATED CLOTTING TIME, LOW RANGE: 361 SEC
ACTIVATED CLOTTING TIME, LOW RANGE: >400 SEC
ACTIVATED CLOTTING TIME, LOW RANGE: >400 SEC
ANION GAP SERPL CALCULATED.3IONS-SCNC: 8 MMOL/L (ref 7–16)
BUN SERPL-MCNC: 21 MG/DL (ref 8–23)
CALCIUM SERPL-MCNC: 10 MG/DL (ref 8.8–10.2)
CHLORIDE SERPL-SCNC: 104 MMOL/L (ref 98–107)
CO2 SERPL-SCNC: 26 MMOL/L (ref 22–29)
CREAT SERPL-MCNC: 0.9 MG/DL (ref 0.7–1.2)
GFR, ESTIMATED: 89 ML/MIN/1.73M2
GLUCOSE SERPL-MCNC: 114 MG/DL (ref 74–99)
POTASSIUM SERPL-SCNC: 4.5 MMOL/L (ref 3.5–5.1)
SODIUM SERPL-SCNC: 138 MMOL/L (ref 136–145)

## 2025-06-02 PROCEDURE — C1889 IMPLANT/INSERT DEVICE, NOC: HCPCS | Performed by: INTERNAL MEDICINE

## 2025-06-02 PROCEDURE — C1769 GUIDE WIRE: HCPCS | Performed by: INTERNAL MEDICINE

## 2025-06-02 PROCEDURE — C1894 INTRO/SHEATH, NON-LASER: HCPCS | Performed by: INTERNAL MEDICINE

## 2025-06-02 PROCEDURE — 2709999900 HC NON-CHARGEABLE SUPPLY: Performed by: INTERNAL MEDICINE

## 2025-06-02 PROCEDURE — 33340 PERQ CLSR TCAT L ATR APNDGE: CPT | Performed by: INTERNAL MEDICINE

## 2025-06-02 PROCEDURE — 93320 DOPPLER ECHO COMPLETE: CPT

## 2025-06-02 PROCEDURE — 6360000002 HC RX W HCPCS: Performed by: NURSE ANESTHETIST, CERTIFIED REGISTERED

## 2025-06-02 PROCEDURE — B245ZZ4 ULTRASONOGRAPHY OF LEFT HEART, TRANSESOPHAGEAL: ICD-10-PCS | Performed by: INTERNAL MEDICINE

## 2025-06-02 PROCEDURE — C1760 CLOSURE DEV, VASC: HCPCS | Performed by: INTERNAL MEDICINE

## 2025-06-02 PROCEDURE — 6360000002 HC RX W HCPCS

## 2025-06-02 PROCEDURE — 6370000000 HC RX 637 (ALT 250 FOR IP): Performed by: INTERNAL MEDICINE

## 2025-06-02 PROCEDURE — 2500000003 HC RX 250 WO HCPCS: Performed by: INTERNAL MEDICINE

## 2025-06-02 PROCEDURE — 2500000003 HC RX 250 WO HCPCS: Performed by: NURSE ANESTHETIST, CERTIFIED REGISTERED

## 2025-06-02 PROCEDURE — 6370000000 HC RX 637 (ALT 250 FOR IP): Performed by: ANESTHESIOLOGY

## 2025-06-02 PROCEDURE — 93355 ECHO TRANSESOPHAGEAL (TEE): CPT | Performed by: INTERNAL MEDICINE

## 2025-06-02 PROCEDURE — 2140000000 HC CCU INTERMEDIATE R&B

## 2025-06-02 PROCEDURE — 03HY32Z INSERTION OF MONITORING DEVICE INTO UPPER ARTERY, PERCUTANEOUS APPROACH: ICD-10-PCS | Performed by: INTERNAL MEDICINE

## 2025-06-02 PROCEDURE — 94640 AIRWAY INHALATION TREATMENT: CPT

## 2025-06-02 PROCEDURE — 93005 ELECTROCARDIOGRAM TRACING: CPT | Performed by: INTERNAL MEDICINE

## 2025-06-02 PROCEDURE — 3700000001 HC ADD 15 MINUTES (ANESTHESIA): Performed by: INTERNAL MEDICINE

## 2025-06-02 PROCEDURE — 7100000001 HC PACU RECOVERY - ADDTL 15 MIN: Performed by: INTERNAL MEDICINE

## 2025-06-02 PROCEDURE — 93462 L HRT CATH TRNSPTL PUNCTURE: CPT | Performed by: INTERNAL MEDICINE

## 2025-06-02 PROCEDURE — 2580000003 HC RX 258: Performed by: INTERNAL MEDICINE

## 2025-06-02 PROCEDURE — 02L73DK OCCLUSION OF LEFT ATRIAL APPENDAGE WITH INTRALUMINAL DEVICE, PERCUTANEOUS APPROACH: ICD-10-PCS | Performed by: INTERNAL MEDICINE

## 2025-06-02 PROCEDURE — 6360000002 HC RX W HCPCS: Performed by: INTERNAL MEDICINE

## 2025-06-02 PROCEDURE — 80048 BASIC METABOLIC PNL TOTAL CA: CPT

## 2025-06-02 PROCEDURE — 2580000003 HC RX 258

## 2025-06-02 PROCEDURE — C1893 INTRO/SHEATH, FIXED,NON-PEEL: HCPCS | Performed by: INTERNAL MEDICINE

## 2025-06-02 PROCEDURE — 6360000004 HC RX CONTRAST MEDICATION: Performed by: INTERNAL MEDICINE

## 2025-06-02 PROCEDURE — 2720000010 HC SURG SUPPLY STERILE: Performed by: INTERNAL MEDICINE

## 2025-06-02 PROCEDURE — 85347 COAGULATION TIME ACTIVATED: CPT

## 2025-06-02 PROCEDURE — 7100000000 HC PACU RECOVERY - FIRST 15 MIN: Performed by: INTERNAL MEDICINE

## 2025-06-02 PROCEDURE — 2500000003 HC RX 250 WO HCPCS

## 2025-06-02 PROCEDURE — 2700000000 HC OXYGEN THERAPY PER DAY

## 2025-06-02 PROCEDURE — 3700000000 HC ANESTHESIA ATTENDED CARE: Performed by: INTERNAL MEDICINE

## 2025-06-02 DEVICE — LEFT ATRIAL APPENDAGE CLOSURE DEVICE WITH DELIVERY SYSTEM
Type: IMPLANTABLE DEVICE | Status: FUNCTIONAL
Brand: WATCHMAN FLX™ PRO

## 2025-06-02 RX ORDER — HYDRALAZINE HYDROCHLORIDE 20 MG/ML
5 INJECTION INTRAMUSCULAR; INTRAVENOUS
Status: DISCONTINUED | OUTPATIENT
Start: 2025-06-02 | End: 2025-06-02 | Stop reason: HOSPADM

## 2025-06-02 RX ORDER — PROPOFOL 10 MG/ML
INJECTION, EMULSION INTRAVENOUS
Status: DISCONTINUED | OUTPATIENT
Start: 2025-06-02 | End: 2025-06-02 | Stop reason: SDUPTHER

## 2025-06-02 RX ORDER — FLUTICASONE PROPIONATE 50 MCG
1 SPRAY, SUSPENSION (ML) NASAL NIGHTLY PRN
Status: DISCONTINUED | OUTPATIENT
Start: 2025-06-02 | End: 2025-06-03 | Stop reason: HOSPADM

## 2025-06-02 RX ORDER — FENTANYL CITRATE 50 UG/ML
INJECTION, SOLUTION INTRAMUSCULAR; INTRAVENOUS
Status: DISCONTINUED | OUTPATIENT
Start: 2025-06-02 | End: 2025-06-02 | Stop reason: SDUPTHER

## 2025-06-02 RX ORDER — HYDRALAZINE HYDROCHLORIDE 20 MG/ML
INJECTION INTRAMUSCULAR; INTRAVENOUS
Status: DISCONTINUED | OUTPATIENT
Start: 2025-06-02 | End: 2025-06-02 | Stop reason: SDUPTHER

## 2025-06-02 RX ORDER — SODIUM CHLORIDE 9 MG/ML
INJECTION, SOLUTION INTRAVENOUS CONTINUOUS
Status: DISCONTINUED | OUTPATIENT
Start: 2025-06-02 | End: 2025-06-02 | Stop reason: HOSPADM

## 2025-06-02 RX ORDER — LABETALOL HYDROCHLORIDE 5 MG/ML
5 INJECTION, SOLUTION INTRAVENOUS
Status: DISCONTINUED | OUTPATIENT
Start: 2025-06-02 | End: 2025-06-02 | Stop reason: HOSPADM

## 2025-06-02 RX ORDER — SODIUM CHLORIDE 9 MG/ML
INJECTION, SOLUTION INTRAVENOUS PRN
Status: DISCONTINUED | OUTPATIENT
Start: 2025-06-02 | End: 2025-06-03 | Stop reason: HOSPADM

## 2025-06-02 RX ORDER — MEPERIDINE HYDROCHLORIDE 25 MG/ML
12.5 INJECTION INTRAMUSCULAR; INTRAVENOUS; SUBCUTANEOUS EVERY 5 MIN PRN
Status: DISCONTINUED | OUTPATIENT
Start: 2025-06-02 | End: 2025-06-02 | Stop reason: HOSPADM

## 2025-06-02 RX ORDER — DEXAMETHASONE SODIUM PHOSPHATE 10 MG/ML
INJECTION, SOLUTION INTRA-ARTICULAR; INTRALESIONAL; INTRAMUSCULAR; INTRAVENOUS; SOFT TISSUE
Status: DISCONTINUED | OUTPATIENT
Start: 2025-06-02 | End: 2025-06-02 | Stop reason: SDUPTHER

## 2025-06-02 RX ORDER — ENOXAPARIN SODIUM 100 MG/ML
40 INJECTION SUBCUTANEOUS DAILY
Status: DISCONTINUED | OUTPATIENT
Start: 2025-06-03 | End: 2025-06-03 | Stop reason: HOSPADM

## 2025-06-02 RX ORDER — IPRATROPIUM BROMIDE AND ALBUTEROL SULFATE 2.5; .5 MG/3ML; MG/3ML
1 SOLUTION RESPIRATORY (INHALATION)
Status: COMPLETED | OUTPATIENT
Start: 2025-06-02 | End: 2025-06-02

## 2025-06-02 RX ORDER — SODIUM CHLORIDE 0.9 % (FLUSH) 0.9 %
5-40 SYRINGE (ML) INJECTION PRN
Status: DISCONTINUED | OUTPATIENT
Start: 2025-06-02 | End: 2025-06-02 | Stop reason: HOSPADM

## 2025-06-02 RX ORDER — PANTOPRAZOLE SODIUM 40 MG/1
40 TABLET, DELAYED RELEASE ORAL
Status: DISCONTINUED | OUTPATIENT
Start: 2025-06-03 | End: 2025-06-03 | Stop reason: HOSPADM

## 2025-06-02 RX ORDER — MIDAZOLAM HYDROCHLORIDE 2 MG/2ML
2 INJECTION, SOLUTION INTRAMUSCULAR; INTRAVENOUS
Status: DISCONTINUED | OUTPATIENT
Start: 2025-06-02 | End: 2025-06-02 | Stop reason: HOSPADM

## 2025-06-02 RX ORDER — ASPIRIN 81 MG/1
81 TABLET ORAL DAILY
Status: DISCONTINUED | OUTPATIENT
Start: 2025-06-03 | End: 2025-06-03 | Stop reason: HOSPADM

## 2025-06-02 RX ORDER — SODIUM CHLORIDE 0.9 % (FLUSH) 0.9 %
5-40 SYRINGE (ML) INJECTION EVERY 12 HOURS SCHEDULED
Status: DISCONTINUED | OUTPATIENT
Start: 2025-06-02 | End: 2025-06-02 | Stop reason: HOSPADM

## 2025-06-02 RX ORDER — SODIUM CHLORIDE 0.9 % (FLUSH) 0.9 %
5-40 SYRINGE (ML) INJECTION PRN
Status: DISCONTINUED | OUTPATIENT
Start: 2025-06-02 | End: 2025-06-03 | Stop reason: HOSPADM

## 2025-06-02 RX ORDER — CLOPIDOGREL BISULFATE 75 MG/1
75 TABLET ORAL DAILY
Status: DISCONTINUED | OUTPATIENT
Start: 2025-06-03 | End: 2025-06-03 | Stop reason: HOSPADM

## 2025-06-02 RX ORDER — ONDANSETRON 4 MG/1
4 TABLET, ORALLY DISINTEGRATING ORAL EVERY 8 HOURS PRN
Status: DISCONTINUED | OUTPATIENT
Start: 2025-06-02 | End: 2025-06-03 | Stop reason: HOSPADM

## 2025-06-02 RX ORDER — MULTIVITAMIN WITH IRON
1 TABLET ORAL DAILY
Status: DISCONTINUED | OUTPATIENT
Start: 2025-06-02 | End: 2025-06-03 | Stop reason: HOSPADM

## 2025-06-02 RX ORDER — ACETAMINOPHEN 325 MG/1
650 TABLET ORAL EVERY 6 HOURS PRN
Status: DISCONTINUED | OUTPATIENT
Start: 2025-06-02 | End: 2025-06-03 | Stop reason: HOSPADM

## 2025-06-02 RX ORDER — ACETAMINOPHEN 500 MG
1000 TABLET ORAL NIGHTLY PRN
Status: DISCONTINUED | OUTPATIENT
Start: 2025-06-02 | End: 2025-06-03 | Stop reason: HOSPADM

## 2025-06-02 RX ORDER — ASPIRIN 325 MG
325 TABLET ORAL ONCE
Status: COMPLETED | OUTPATIENT
Start: 2025-06-02 | End: 2025-06-02

## 2025-06-02 RX ORDER — HYDROMORPHONE HYDROCHLORIDE 1 MG/ML
0.25 INJECTION, SOLUTION INTRAMUSCULAR; INTRAVENOUS; SUBCUTANEOUS EVERY 5 MIN PRN
Status: DISCONTINUED | OUTPATIENT
Start: 2025-06-02 | End: 2025-06-02 | Stop reason: HOSPADM

## 2025-06-02 RX ORDER — PHENYLEPHRINE HYDROCHLORIDE 10 MG/ML
INJECTION INTRAVENOUS
Status: DISCONTINUED | OUTPATIENT
Start: 2025-06-02 | End: 2025-06-02 | Stop reason: SDUPTHER

## 2025-06-02 RX ORDER — NALOXONE HYDROCHLORIDE 0.4 MG/ML
INJECTION, SOLUTION INTRAMUSCULAR; INTRAVENOUS; SUBCUTANEOUS PRN
Status: DISCONTINUED | OUTPATIENT
Start: 2025-06-02 | End: 2025-06-02 | Stop reason: HOSPADM

## 2025-06-02 RX ORDER — MELOXICAM 7.5 MG/1
15 TABLET ORAL DAILY
Status: DISCONTINUED | OUTPATIENT
Start: 2025-06-02 | End: 2025-06-03 | Stop reason: HOSPADM

## 2025-06-02 RX ORDER — DIPHENHYDRAMINE HCL 25 MG
50 TABLET ORAL NIGHTLY PRN
Status: DISCONTINUED | OUTPATIENT
Start: 2025-06-02 | End: 2025-06-03 | Stop reason: HOSPADM

## 2025-06-02 RX ORDER — CLOPIDOGREL BISULFATE 75 MG/1
75 TABLET ORAL DAILY
Status: DISCONTINUED | OUTPATIENT
Start: 2025-06-03 | End: 2025-06-02 | Stop reason: SDUPTHER

## 2025-06-02 RX ORDER — SODIUM CHLORIDE, SODIUM LACTATE, POTASSIUM CHLORIDE, CALCIUM CHLORIDE 600; 310; 30; 20 MG/100ML; MG/100ML; MG/100ML; MG/100ML
INJECTION, SOLUTION INTRAVENOUS
Status: DISCONTINUED | OUTPATIENT
Start: 2025-06-02 | End: 2025-06-02 | Stop reason: SDUPTHER

## 2025-06-02 RX ORDER — HEPARIN SODIUM 1000 [USP'U]/ML
INJECTION, SOLUTION INTRAVENOUS; SUBCUTANEOUS
Status: DISCONTINUED | OUTPATIENT
Start: 2025-06-02 | End: 2025-06-02 | Stop reason: SDUPTHER

## 2025-06-02 RX ORDER — PROTAMINE SULFATE 10 MG/ML
INJECTION, SOLUTION INTRAVENOUS
Status: DISCONTINUED | OUTPATIENT
Start: 2025-06-02 | End: 2025-06-02 | Stop reason: SDUPTHER

## 2025-06-02 RX ORDER — ONDANSETRON 2 MG/ML
4 INJECTION INTRAMUSCULAR; INTRAVENOUS EVERY 6 HOURS PRN
Status: DISCONTINUED | OUTPATIENT
Start: 2025-06-02 | End: 2025-06-03 | Stop reason: HOSPADM

## 2025-06-02 RX ORDER — SODIUM CHLORIDE 9 MG/ML
INJECTION, SOLUTION INTRAVENOUS PRN
Status: DISCONTINUED | OUTPATIENT
Start: 2025-06-02 | End: 2025-06-02 | Stop reason: HOSPADM

## 2025-06-02 RX ORDER — AMLODIPINE BESYLATE 10 MG/1
10 TABLET ORAL DAILY
Status: DISCONTINUED | OUTPATIENT
Start: 2025-06-03 | End: 2025-06-03 | Stop reason: HOSPADM

## 2025-06-02 RX ORDER — GLYCOPYRROLATE 1 MG/5 ML
SYRINGE (ML) INTRAVENOUS
Status: DISCONTINUED | OUTPATIENT
Start: 2025-06-02 | End: 2025-06-02 | Stop reason: SDUPTHER

## 2025-06-02 RX ORDER — HYDROMORPHONE HYDROCHLORIDE 1 MG/ML
0.5 INJECTION, SOLUTION INTRAMUSCULAR; INTRAVENOUS; SUBCUTANEOUS EVERY 5 MIN PRN
Status: DISCONTINUED | OUTPATIENT
Start: 2025-06-02 | End: 2025-06-02 | Stop reason: HOSPADM

## 2025-06-02 RX ORDER — DROPERIDOL 2.5 MG/ML
0.62 INJECTION, SOLUTION INTRAMUSCULAR; INTRAVENOUS
Status: DISCONTINUED | OUTPATIENT
Start: 2025-06-02 | End: 2025-06-02 | Stop reason: HOSPADM

## 2025-06-02 RX ORDER — CLOPIDOGREL 300 MG/1
600 TABLET, FILM COATED ORAL ONCE
Status: COMPLETED | OUTPATIENT
Start: 2025-06-02 | End: 2025-06-02

## 2025-06-02 RX ORDER — DIPHENHYDRAMINE HYDROCHLORIDE 50 MG/ML
12.5 INJECTION, SOLUTION INTRAMUSCULAR; INTRAVENOUS
Status: DISCONTINUED | OUTPATIENT
Start: 2025-06-02 | End: 2025-06-02 | Stop reason: HOSPADM

## 2025-06-02 RX ORDER — ROCURONIUM BROMIDE 10 MG/ML
INJECTION, SOLUTION INTRAVENOUS
Status: DISCONTINUED | OUTPATIENT
Start: 2025-06-02 | End: 2025-06-02 | Stop reason: SDUPTHER

## 2025-06-02 RX ORDER — LIDOCAINE HYDROCHLORIDE 20 MG/ML
INJECTION, SOLUTION INTRAVENOUS
Status: DISCONTINUED | OUTPATIENT
Start: 2025-06-02 | End: 2025-06-02 | Stop reason: SDUPTHER

## 2025-06-02 RX ORDER — METOPROLOL TARTRATE 50 MG
50 TABLET ORAL ONCE
Status: COMPLETED | OUTPATIENT
Start: 2025-06-02 | End: 2025-06-02

## 2025-06-02 RX ORDER — ACETAMINOPHEN 325 MG/1
650 TABLET ORAL
Status: DISCONTINUED | OUTPATIENT
Start: 2025-06-02 | End: 2025-06-02 | Stop reason: HOSPADM

## 2025-06-02 RX ORDER — ASCORBIC ACID 500 MG
500 TABLET ORAL DAILY
Status: DISCONTINUED | OUTPATIENT
Start: 2025-06-02 | End: 2025-06-03 | Stop reason: HOSPADM

## 2025-06-02 RX ORDER — IOPAMIDOL 755 MG/ML
INJECTION, SOLUTION INTRAVASCULAR PRN
Status: DISCONTINUED | OUTPATIENT
Start: 2025-06-02 | End: 2025-06-02 | Stop reason: HOSPADM

## 2025-06-02 RX ORDER — METOPROLOL SUCCINATE 25 MG/1
50 TABLET, EXTENDED RELEASE ORAL 2 TIMES DAILY
Status: DISCONTINUED | OUTPATIENT
Start: 2025-06-02 | End: 2025-06-03 | Stop reason: HOSPADM

## 2025-06-02 RX ORDER — ONDANSETRON 2 MG/ML
4 INJECTION INTRAMUSCULAR; INTRAVENOUS
Status: DISCONTINUED | OUTPATIENT
Start: 2025-06-02 | End: 2025-06-02 | Stop reason: HOSPADM

## 2025-06-02 RX ORDER — MIDAZOLAM HYDROCHLORIDE 1 MG/ML
INJECTION, SOLUTION INTRAMUSCULAR; INTRAVENOUS
Status: DISCONTINUED | OUTPATIENT
Start: 2025-06-02 | End: 2025-06-02 | Stop reason: SDUPTHER

## 2025-06-02 RX ORDER — ONDANSETRON 2 MG/ML
INJECTION INTRAMUSCULAR; INTRAVENOUS
Status: DISCONTINUED | OUTPATIENT
Start: 2025-06-02 | End: 2025-06-02 | Stop reason: SDUPTHER

## 2025-06-02 RX ORDER — SODIUM CHLORIDE 0.9 % (FLUSH) 0.9 %
5-40 SYRINGE (ML) INJECTION EVERY 12 HOURS SCHEDULED
Status: DISCONTINUED | OUTPATIENT
Start: 2025-06-02 | End: 2025-06-03 | Stop reason: HOSPADM

## 2025-06-02 RX ADMIN — METOPROLOL TARTRATE 50 MG: 50 TABLET, FILM COATED ORAL at 13:42

## 2025-06-02 RX ADMIN — ASPIRIN 325 MG: 325 TABLET ORAL at 07:16

## 2025-06-02 RX ADMIN — SODIUM CHLORIDE, POTASSIUM CHLORIDE, SODIUM LACTATE AND CALCIUM CHLORIDE: 600; 310; 30; 20 INJECTION, SOLUTION INTRAVENOUS at 08:34

## 2025-06-02 RX ADMIN — PROPOFOL 150 MG: 10 INJECTION, EMULSION INTRAVENOUS at 08:02

## 2025-06-02 RX ADMIN — PHENYLEPHRINE HYDROCHLORIDE 100 MCG: 10 INJECTION, SOLUTION INTRAVENOUS at 08:40

## 2025-06-02 RX ADMIN — ROCURONIUM BROMIDE 30 MG: 10 INJECTION, SOLUTION INTRAVENOUS at 08:02

## 2025-06-02 RX ADMIN — PHENYLEPHRINE HYDROCHLORIDE 100 MCG: 10 INJECTION, SOLUTION INTRAVENOUS at 08:18

## 2025-06-02 RX ADMIN — MIDAZOLAM 1 MG: 1 INJECTION INTRAMUSCULAR; INTRAVENOUS at 07:53

## 2025-06-02 RX ADMIN — PHENYLEPHRINE HYDROCHLORIDE 100 MCG: 10 INJECTION, SOLUTION INTRAVENOUS at 08:32

## 2025-06-02 RX ADMIN — Medication 0.2 MG: at 08:41

## 2025-06-02 RX ADMIN — CHOLECALCIFEROL TAB 125 MCG (5000 UNIT) 10000 UNITS: 125 TAB at 13:42

## 2025-06-02 RX ADMIN — SUGAMMADEX 200 MG: 100 INJECTION, SOLUTION INTRAVENOUS at 09:34

## 2025-06-02 RX ADMIN — PHENYLEPHRINE HYDROCHLORIDE 100 MCG: 10 INJECTION, SOLUTION INTRAVENOUS at 09:08

## 2025-06-02 RX ADMIN — SODIUM CHLORIDE, PRESERVATIVE FREE 10 ML: 5 INJECTION INTRAVENOUS at 21:34

## 2025-06-02 RX ADMIN — MIDAZOLAM 1 MG: 1 INJECTION INTRAMUSCULAR; INTRAVENOUS at 07:57

## 2025-06-02 RX ADMIN — CLOPIDOGREL BISULFATE 600 MG: 300 TABLET, FILM COATED ORAL at 07:16

## 2025-06-02 RX ADMIN — IPRATROPIUM BROMIDE AND ALBUTEROL SULFATE 1 DOSE: 2.5; .5 SOLUTION RESPIRATORY (INHALATION) at 11:09

## 2025-06-02 RX ADMIN — MELOXICAM 15 MG: 7.5 TABLET ORAL at 13:42

## 2025-06-02 RX ADMIN — ONDANSETRON HYDROCHLORIDE 4 MG: 2 INJECTION, SOLUTION INTRAMUSCULAR; INTRAVENOUS at 08:57

## 2025-06-02 RX ADMIN — OXYCODONE HYDROCHLORIDE AND ACETAMINOPHEN 500 MG: 500 TABLET ORAL at 13:42

## 2025-06-02 RX ADMIN — PHENYLEPHRINE HYDROCHLORIDE 100 MCG: 10 INJECTION, SOLUTION INTRAVENOUS at 08:25

## 2025-06-02 RX ADMIN — MULTIVITAMIN TABLET 1 TABLET: TABLET at 13:42

## 2025-06-02 RX ADMIN — LIDOCAINE HYDROCHLORIDE 100 MG: 20 INJECTION, SOLUTION INTRAVENOUS at 08:03

## 2025-06-02 RX ADMIN — METOPROLOL SUCCINATE 50 MG: 25 TABLET, EXTENDED RELEASE ORAL at 21:34

## 2025-06-02 RX ADMIN — SODIUM CHLORIDE: 9 INJECTION, SOLUTION INTRAVENOUS at 07:07

## 2025-06-02 RX ADMIN — ROCURONIUM BROMIDE 20 MG: 10 INJECTION, SOLUTION INTRAVENOUS at 08:17

## 2025-06-02 RX ADMIN — PROTAMINE SULFATE 100 MG: 10 INJECTION, SOLUTION INTRAVENOUS at 09:24

## 2025-06-02 RX ADMIN — HYDRALAZINE HYDROCHLORIDE 5 MG: 20 INJECTION INTRAMUSCULAR; INTRAVENOUS at 09:28

## 2025-06-02 RX ADMIN — HEPARIN SODIUM 10000 UNITS: 1000 INJECTION, SOLUTION INTRAVENOUS; SUBCUTANEOUS at 08:17

## 2025-06-02 RX ADMIN — SODIUM CHLORIDE, POTASSIUM CHLORIDE, SODIUM LACTATE AND CALCIUM CHLORIDE: 600; 310; 30; 20 INJECTION, SOLUTION INTRAVENOUS at 07:57

## 2025-06-02 RX ADMIN — SUGAMMADEX 200 MG: 100 INJECTION, SOLUTION INTRAVENOUS at 09:30

## 2025-06-02 RX ADMIN — PHENYLEPHRINE HYDROCHLORIDE 100 MCG: 10 INJECTION, SOLUTION INTRAVENOUS at 09:18

## 2025-06-02 RX ADMIN — ACETAMINOPHEN 650 MG: 325 TABLET ORAL at 15:39

## 2025-06-02 RX ADMIN — FENTANYL CITRATE 100 MCG: 50 INJECTION, SOLUTION INTRAMUSCULAR; INTRAVENOUS at 08:02

## 2025-06-02 RX ADMIN — DEXAMETHASONE SODIUM PHOSPHATE 10 MG: 10 INJECTION INTRAMUSCULAR; INTRAVENOUS at 08:07

## 2025-06-02 RX ADMIN — HEPARIN SODIUM 3000 UNITS: 1000 INJECTION, SOLUTION INTRAVENOUS; SUBCUTANEOUS at 09:02

## 2025-06-02 RX ADMIN — SODIUM CHLORIDE 1500 MG: 9 INJECTION, SOLUTION INTRAVENOUS at 07:16

## 2025-06-02 RX ADMIN — VANCOMYCIN HYDROCHLORIDE 1000 MG: 1 INJECTION, POWDER, LYOPHILIZED, FOR SOLUTION INTRAVENOUS at 15:41

## 2025-06-02 ASSESSMENT — PAIN SCALES - GENERAL
PAINLEVEL_OUTOF10: 0
PAINLEVEL_OUTOF10: 6
PAINLEVEL_OUTOF10: 2
PAINLEVEL_OUTOF10: 0

## 2025-06-02 ASSESSMENT — ENCOUNTER SYMPTOMS: SHORTNESS OF BREATH: 0

## 2025-06-02 ASSESSMENT — PAIN DESCRIPTION - DESCRIPTORS: DESCRIPTORS: DULL;DISCOMFORT;ACHING

## 2025-06-02 ASSESSMENT — PAIN - FUNCTIONAL ASSESSMENT: PAIN_FUNCTIONAL_ASSESSMENT: ADULT NONVERBAL PAIN SCALE (NPVS)

## 2025-06-02 ASSESSMENT — PAIN DESCRIPTION - LOCATION: LOCATION: HEAD

## 2025-06-02 ASSESSMENT — PAIN DESCRIPTION - ORIENTATION: ORIENTATION: MID

## 2025-06-02 ASSESSMENT — LIFESTYLE VARIABLES: SMOKING_STATUS: 0

## 2025-06-02 NOTE — ANESTHESIA PROCEDURE NOTES
Arterial Line:    An arterial line was placed using surface landmarks, in the pre-op for the following indication(s): continuous blood pressure monitoring and blood sampling needed.    A 20 gauge (size), 1 and 3/8 inch (length), Arrow (type) catheter was placed, Seldinger technique used, into the right radial artery, secured by tape and Tegaderm.  Anesthesia type: Local  Local infiltration: Injection    Events:  patient tolerated procedure well with no complications.6/2/2025 7:08 AM6/2/2025 7:16 AM  Anesthesiologist: Catalina Mcmillan MD  Resident/CRNA: Noam Willoughby, APRN - CRNA  Other anesthesia staff: Javi Marshall RN  Performed: Other staff   Preanesthetic Checklist  Completed: patient identified, IV checked, site marked, risks and benefits discussed, surgical/procedural consents, equipment checked, pre-op evaluation, timeout performed, anesthesia consent given, oxygen available and monitors applied/VS acknowledged

## 2025-06-02 NOTE — PROGRESS NOTES
Dr. Larkin notified via CVL nurse regarding pt complaint of SOB, asymptomatic, BS clear, spO2 95-98% on 2L NC. Duoneb treatment given.

## 2025-06-02 NOTE — H&P
Lab Results   Component Value Date/Time    TSH 1.47 09/20/2023 10:28 AM      No components found for: \"LDLCALC\"            FRAILTY ASSESSMENT:   New York Heart Association (NYHA) Classification: II   HUMA²DS²-VASc Score: 4 (age, HTN, DM)   HAS-BLED Score: 2 (age, bleeding)       ASSESSMENT & PLAN:     Proceed with planned Left Atrial Appendage Occlusion Device (Watchman):   Will do the following in preparation for procedure:   Keep NPO for the procedure   Anesthesia Planned:?General   Intraprocedural Imaging:?Transesophageal Echocardiogram (notify echo department)   Intended Access:?right femoral venous access   Remainder of orders to be placed post-procedure   Will resume home medications after the procedure as appropriate.   Admission to the hospital for observation following procedure.   Pt is under the care of a cohesive multidisciplinary team.     Rodney Last?will be enrolled in approved national registry, NCT #[id number], as mandated by National Coverage Decision for CMS.

## 2025-06-02 NOTE — PROGRESS NOTES
4 Eyes Skin Assessment     NAME:  Rodney Last  YOB: 1950  MEDICAL RECORD NUMBER:  [id number]    The patient is being assessed for  Admission    I agree that at least one RN has performed a thorough Head to Toe Skin Assessment on the patient. ALL assessment sites listed below have been assessed.      Areas assessed by both nurses:    Head, Face, Ears, Shoulders, Back, Chest, Arms, Elbows, Hands, Sacrum. Buttock, Coccyx, Ischium, Legs. Feet and Heels, and Under Medical Devices         Does the Patient have a Wound? No noted wound(s)       Jose E Prevention initiated by RN: No  Wound Care Orders initiated by RN: No    Pressure Injury (Stage 3,4, Unstageable, DTI, NWPT, and Complex wounds) if present, place Wound referral order by RN under : No    New Ostomies, if present place, Ostomy referral order under : No     Nurse 1 eSignature: Electronically signed by Kimberly RossiPallante, RN on 6/2/25 at 2:51 PM EDT    **SHARE this note so that the co-signing nurse can place an eSignature**    Nurse 2 eSignature: Electronically signed by Reinaldo Scott RN on 6/2/25 at 2:52 PM EDT

## 2025-06-02 NOTE — ANESTHESIA POSTPROCEDURE EVALUATION
Department of Anesthesiology  Postprocedure Note    Patient: Rodney Last  MRN: [id number]  YOB: 1950  Date of evaluation: 6/2/2025    Procedure Summary       Date: 06/02/25 [place] [facility] [place] [facility] [place] [facility]    Anesthesia Start: 0751 Anesthesia Stop: 0954    Procedure: Watchman francisca closure device Diagnosis:       Longstanding persistent atrial fibrillation (HCC)      (Afib)    Providers: Gabe Larkin MD Responsible Provider: Catalina Mcmillan MD    Anesthesia Type: General ASA Status: 3            Anesthesia Type: General    Anand Phase I: Anand Score: 8    Anand Phase II:      Anesthesia Post Evaluation    Patient location during evaluation: PACU  Patient participation: complete - patient participated  Level of consciousness: awake and alert  Airway patency: patent  Nausea & Vomiting: no nausea and no vomiting  Cardiovascular status: blood pressure returned to baseline and hemodynamically stable  Respiratory status: acceptable and spontaneous ventilation  Hydration status: euvolemic  Multimodal analgesia pain management approach  Pain management: adequate    There were no known notable events for this encounter.

## 2025-06-02 NOTE — DISCHARGE INSTRUCTIONS
Dr Larkin's [facility] Instructions    Restrictions  No driving for 2 days  Lifting, push, pull restrictions max of 10 lbs. for 1 week  No strenuous activity for 1 week.   No stairs for 24 hours. Minimal walking today.  No driving, working, or sexual activity for 48 hours.  Splint procedure site with activity today such as: Changing Positions, Coughing,  Sneezing, Laughing  You may shower after 24 hours  Soak dressing with water in shower and gently peel off. Cleanse in shower gently with soap and water. Cover with band aid for additional 24 hours then remove.   Keep the incision area clean and dry (Do not scrub the area.)   You need use a Band-Aid over the incision if you wish to, however, change it every day  Do not use creams, lotions, or ointments on the incision site   Look at the area daily to make sure it is healing properly  If you notice any of the signs of infection please call your doctor  Bruising can occur and will resolve over time.  No tub bathing or swimming for 1 week.    Traveling Home          For your safety, a responsible adult must drive you home the day of discharge.       Call your physician if you notice:    Please call us right away if you have any of these signs of infection:  Increased drainage,pain,  bleeding, or oozing from the incision site (If this develops please apply direct pressure to site.)  Increased opening of the incision  Redness, swelling, or warmth around the incision site  Increased body temperature (greater than 101° F or 38.4° C)  Fever, shaking or chills, and/or dizziness  Numbness, tingling or cramping in the procedure leg at rest or with activity.  Heart palpitations (sensation of fast or hard beating from your heart)  If you gain more than 2 lbs. overnight or 4 pounds in a week  Also call your provider if you have:  Chest pain, shortness of breath, weight gain, lower extremity swelling or any other concerns      Medications:    You must continue to take your

## 2025-06-02 NOTE — ANESTHESIA PRE PROCEDURE
BUN 21 05/28/2025 09:11 AM    CREATININE 0.9 05/28/2025 09:11 AM    GFRAA >60 05/20/2020 05:51 AM    LABGLOM 89 05/28/2025 09:11 AM    LABGLOM >60 10/30/2023 10:15 AM    GLUCOSE 107 05/28/2025 09:11 AM    CALCIUM 9.9 05/28/2025 09:11 AM    BILITOT 0.5 03/13/2025 12:25 PM    ALKPHOS 85 03/13/2025 12:25 PM    AST 32 03/13/2025 12:25 PM    ALT 32 03/13/2025 12:25 PM       POC Tests: No results for input(s): \"POCGLU\", \"POCNA\", \"POCK\", \"POCCL\", \"POCBUN\", \"POCHEMO\", \"POCHCT\" in the last 72 hours.    Coags:   Lab Results   Component Value Date/Time    PROTIME 11.6 05/28/2025 09:11 AM    INR 1.1 05/28/2025 09:11 AM    APTT 32.9 04/18/2023 02:00 AM       HCG (If Applicable): No results found for: \"PREGTESTUR\", \"PREGSERUM\", \"HCG\", \"HCGQUANT\"     ABGs: No results found for: \"PHART\", \"PO2ART\", \"HSE6CCA\", \"HVT5BPS\", \"BEART\", \"[id number]\"     Type & Screen (If Applicable):  Lab Results   Component Value Date    ABORH A NEGATIVE 07/26/2023    LABANTI NEGATIVE 07/26/2023       Drug/Infectious Status (If Applicable):  No results found for: \"HIV\", \"HEPCAB\"    COVID-19 Screening (If Applicable):   Lab Results   Component Value Date/Time    COVID19 Not Detected 09/20/2023 10:28 AM           Anesthesia Evaluation  Patient summary reviewed and Nursing notes reviewed   no history of anesthetic complications:   Airway: Mallampati: III  TM distance: >3 FB   Neck ROM: full  Mouth opening: > = 3 FB   Dental: normal exam     Comment: Denies missing, loose, chipped teeth    Pulmonary:normal exam  breath sounds clear to auscultation  (+)     sleep apnea: on noncompliant,           (-) COPD, shortness of breath and not a current smoker          Patient did not smoke on day of surgery.                 Cardiovascular:  Exercise tolerance: good (>4 METS)  (+) hypertension:, dysrhythmias: atrial fibrillation    (-) pacemaker, past MI, CABG/stent,  angina and  SUAREZ    ECG reviewed  Rhythm: irregular  Rate: normal  Echocardiogram reviewed         Beta

## 2025-06-02 NOTE — NURSE NAVIGATOR
Watchman Post OP Education & DC Instructions    I met with Rodney and his wife  to do Watchman post op education & review the Watchman DC instructions &     Reviewed everything on the After Visit Summary: activity, incision care, antibiotic prophylaxis, 45 Day FU ISSAC appt, FU appt w/ Dr. Neumann     Reviewed ASA & Plavix regimen & side effects.  Stress the importance of compliance       Handouts given on above topics. All questions answered. Patient verbalized understanding as evidenced by \"teach back\".     Time spent with patient 15 minutes.

## 2025-06-03 ENCOUNTER — APPOINTMENT (OUTPATIENT)
Age: 75
DRG: 274 | End: 2025-06-03
Attending: INTERNAL MEDICINE
Payer: MEDICARE

## 2025-06-03 LAB
ANION GAP SERPL CALCULATED.3IONS-SCNC: 9 MMOL/L (ref 7–16)
BUN SERPL-MCNC: 20 MG/DL (ref 8–23)
CALCIUM SERPL-MCNC: 9.7 MG/DL (ref 8.8–10.2)
CHLORIDE SERPL-SCNC: 105 MMOL/L (ref 98–107)
CO2 SERPL-SCNC: 24 MMOL/L (ref 22–29)
CREAT SERPL-MCNC: 0.8 MG/DL (ref 0.7–1.2)
EKG ATRIAL RATE: 85 BPM
EKG Q-T INTERVAL: 392 MS
EKG QRS DURATION: 138 MS
EKG QTC CALCULATION (BAZETT): 482 MS
EKG R AXIS: -26 DEGREES
EKG T AXIS: -27 DEGREES
EKG VENTRICULAR RATE: 91 BPM
ERYTHROCYTE [DISTWIDTH] IN BLOOD BY AUTOMATED COUNT: 14.5 % (ref 11.5–15)
GFR, ESTIMATED: >90 ML/MIN/1.73M2
GLUCOSE SERPL-MCNC: 126 MG/DL (ref 74–99)
HCT VFR BLD AUTO: 42.6 % (ref 37–54)
HGB BLD-MCNC: 13.9 G/DL (ref 12.5–16.5)
MCH RBC QN AUTO: 28.6 PG (ref 26–35)
MCHC RBC AUTO-ENTMCNC: 32.6 G/DL (ref 32–34.5)
MCV RBC AUTO: 87.7 FL (ref 80–99.9)
PLATELET # BLD AUTO: 141 K/UL (ref 130–450)
PMV BLD AUTO: 11.5 FL (ref 7–12)
POTASSIUM SERPL-SCNC: 5 MMOL/L (ref 3.5–5.1)
RBC # BLD AUTO: 4.86 M/UL (ref 3.8–5.8)
SODIUM SERPL-SCNC: 138 MMOL/L (ref 136–145)
WBC OTHER # BLD: 9.4 K/UL (ref 4.5–11.5)

## 2025-06-03 PROCEDURE — 80048 BASIC METABOLIC PNL TOTAL CA: CPT

## 2025-06-03 PROCEDURE — 6360000002 HC RX W HCPCS: Performed by: INTERNAL MEDICINE

## 2025-06-03 PROCEDURE — 36415 COLL VENOUS BLD VENIPUNCTURE: CPT

## 2025-06-03 PROCEDURE — 6370000000 HC RX 637 (ALT 250 FOR IP): Performed by: INTERNAL MEDICINE

## 2025-06-03 PROCEDURE — 2500000003 HC RX 250 WO HCPCS: Performed by: INTERNAL MEDICINE

## 2025-06-03 PROCEDURE — 93308 TTE F-UP OR LMTD: CPT

## 2025-06-03 PROCEDURE — 93010 ELECTROCARDIOGRAM REPORT: CPT | Performed by: INTERNAL MEDICINE

## 2025-06-03 PROCEDURE — 2580000003 HC RX 258: Performed by: INTERNAL MEDICINE

## 2025-06-03 PROCEDURE — 85027 COMPLETE CBC AUTOMATED: CPT

## 2025-06-03 RX ORDER — CLOPIDOGREL BISULFATE 75 MG/1
75 TABLET ORAL DAILY
Qty: 90 TABLET | Refills: 1 | Status: SHIPPED | OUTPATIENT
Start: 2025-06-03

## 2025-06-03 RX ORDER — ASPIRIN 81 MG/1
81 TABLET ORAL DAILY
Qty: 30 TABLET | Refills: 0 | Status: SHIPPED | OUTPATIENT
Start: 2025-06-04

## 2025-06-03 RX ORDER — DOXYCYCLINE HYCLATE 100 MG
TABLET ORAL
Qty: 3 TABLET | Refills: 0 | Status: SHIPPED | OUTPATIENT
Start: 2025-06-03

## 2025-06-03 RX ADMIN — ENOXAPARIN SODIUM 40 MG: 100 INJECTION SUBCUTANEOUS at 09:45

## 2025-06-03 RX ADMIN — MELOXICAM 15 MG: 7.5 TABLET ORAL at 09:46

## 2025-06-03 RX ADMIN — AMLODIPINE BESYLATE 10 MG: 10 TABLET ORAL at 09:45

## 2025-06-03 RX ADMIN — OXYCODONE HYDROCHLORIDE AND ACETAMINOPHEN 500 MG: 500 TABLET ORAL at 09:46

## 2025-06-03 RX ADMIN — VANCOMYCIN HYDROCHLORIDE 1000 MG: 1 INJECTION, POWDER, LYOPHILIZED, FOR SOLUTION INTRAVENOUS at 01:43

## 2025-06-03 RX ADMIN — METOPROLOL SUCCINATE 50 MG: 25 TABLET, EXTENDED RELEASE ORAL at 09:45

## 2025-06-03 RX ADMIN — MULTIVITAMIN TABLET 1 TABLET: TABLET at 09:46

## 2025-06-03 RX ADMIN — PANTOPRAZOLE SODIUM 40 MG: 40 TABLET, DELAYED RELEASE ORAL at 07:09

## 2025-06-03 RX ADMIN — ASPIRIN 81 MG: 81 TABLET, COATED ORAL at 09:45

## 2025-06-03 RX ADMIN — SODIUM CHLORIDE, PRESERVATIVE FREE 10 ML: 5 INJECTION INTRAVENOUS at 09:45

## 2025-06-03 RX ADMIN — CHOLECALCIFEROL TAB 125 MCG (5000 UNIT) 10000 UNITS: 125 TAB at 09:46

## 2025-06-03 RX ADMIN — CLOPIDOGREL BISULFATE 75 MG: 75 TABLET, FILM COATED ORAL at 09:45

## 2025-06-03 ASSESSMENT — PAIN SCALES - GENERAL
PAINLEVEL_OUTOF10: 0
PAINLEVEL_OUTOF10: 0

## 2025-06-03 NOTE — PROGRESS NOTES
Spiritual Health History and Assessment/Progress Note  Y  Eusebia Orlando    (P) Initial Encounter,  ,  ,      Name: Rodney Last MRN: [id number]    Age: 74 y.o.     Sex: male   Language: English   Jehovah's witness: Caodaism   Presence of Watchman left atrial appendage closure device     Date: 6/3/2025                           Spiritual Assessment began in [facility]        Referral/Consult From: (P) Rounding   Encounter Overview/Reason: (P) Initial Encounter  Service Provided For: (P) Patient    Ele, Belief, Meaning:   Patient is connected with a ele tradition or spiritual practice and has beliefs or practices that help with coping during difficult times  Family/Friends No family/friends present      Importance and Influence:  Patient has spiritual/personal beliefs that influence decisions regarding their health  Family/Friends No family/friends present    Community:  Patient is connected with a spiritual community and feels well-supported. Support system includes: Spouse/Partner and Ele Community  Family/Friends No family/friends present    Assessment and Plan of Care:     Patient Interventions include: Facilitated expression of thoughts and feelings, Explored spiritual coping/struggle/distress, Engaged in theological reflection, and Affirmed coping skills/support systems  Family/Friends Interventions include: No family/friends present    Patient Plan of Care: Spiritual Care available upon further referral  Family/Friends Plan of Care: No family/friends present    Electronically signed by Chaplain Jasvir on 6/3/2025 at 10:42 AM

## 2025-06-03 NOTE — PLAN OF CARE
Problem: Pain  Goal: Verbalizes/displays adequate comfort level or baseline comfort level  Outcome: Adequate for Discharge     Problem: Chronic Conditions and Co-morbidities  Goal: Patient's chronic conditions and co-morbidity symptoms are monitored and maintained or improved  Outcome: Adequate for Discharge     Problem: Discharge Planning  Goal: Discharge to home or other facility with appropriate resources  6/3/2025 1404 by Melissa Ulloa, RN  Outcome: Adequate for Discharge  6/3/2025 0153 by Farzana Rick, RN  Outcome: Progressing     Problem: Safety - Adult  Goal: Free from fall injury  Outcome: Adequate for Discharge

## 2025-06-03 NOTE — PROGRESS NOTES
Patient received the Sacrament of the Anointing of the Sick by Father Palomo Falcon on Monday, Anca 3, 2025.    If additional support is requested or needed please reach out to Spiritual Health ([id number]).    Chap. Rashel Boggs MDIV, BCC

## 2025-06-03 NOTE — PROGRESS NOTES
Discharge instructions given to pt and wife, who verbalized understanding. Pt opted to ambulate to discharge.

## 2025-06-04 VITALS
DIASTOLIC BLOOD PRESSURE: 82 MMHG | RESPIRATION RATE: 19 BRPM | OXYGEN SATURATION: 96 % | SYSTOLIC BLOOD PRESSURE: 129 MMHG | WEIGHT: 211 LBS | BODY MASS INDEX: 27.96 KG/M2 | HEIGHT: 73 IN | TEMPERATURE: 97.7 F | HEART RATE: 60 BPM

## 2025-06-04 LAB — ECHO BSA: 2.22 M2

## 2025-06-04 PROCEDURE — 93325 DOPPLER ECHO COLOR FLOW MAPG: CPT | Performed by: INTERNAL MEDICINE

## 2025-06-04 PROCEDURE — 93312 ECHO TRANSESOPHAGEAL: CPT | Performed by: INTERNAL MEDICINE

## 2025-06-04 PROCEDURE — 93320 DOPPLER ECHO COMPLETE: CPT | Performed by: INTERNAL MEDICINE

## 2025-06-05 LAB
ECHO BSA: 2.22 M2
ECHO LV EF PHYSICIAN: 60 %

## 2025-06-05 NOTE — DISCHARGE SUMMARY
[facility]    DISCHARGE SUMMARY    DEMOGRAPHICS Rodney Last / 1950 (74 y.o.) / MRN [id number]   ADMIT / DC DATE 6/2/2025 - 6/5/2025 (LOS : 1)   ATTENDING MD No att. providers found   PRIMARY CARDIOLOGY Dr. Wolf   PRIMARY CARE Darien Cardoso MD      REASON FOR ADMIT : Planned Watchman Implant     DISPOSITION Home, lives with family  PLANNED READMIT no    Physician: Gabe Larkin MD    PRIMARY DISCHARGE DIAGNOSIS   1. Persistent Atrial Fibrillation       SECONDARY DISCHARGE DIAGNOSIS      High risk of bleeding 2/2 Hematuria       ** Implanted device is MRI compatible **     ** Prophylactic antibiotics required before dental work for 1 year and avoid cleanings for 8 weeks post-procedure **         CONSULTANTS : none         SUMMARY OF PROCEDURES/STUDIES (see individual procedure notes for more details)      1. 6/2/25:   Successful Left Atrial Appendage Occlusion Device (Watchman) Procedure using 31 mm Watchman FLX Pro device.   Complete seal of KALI without leak as confirmed by color doppler on ISSAC, and contrast injection.   - 1 full recapture and total of 5 partial recaptures.   - No post operative pericardial effusion.     2. 6/3/25:  Limited Echo: No pericardial effusion        FOLLOW STUDIES  Follow up 45 Day ISSAC : 7/18/25      FOLLOW UP APPOINTMENTS   Dr. Larkin : 8/5/25      DISCHARGE MEDICATIONS   Discharge Medication List as of 6/3/2025  1:40 PM        START taking these medications    Details   aspirin 81 MG EC tablet Take 1 tablet by mouth daily, Disp-30 tablet, R-0Normal      doxycycline hyclate (VIBRA-TABS) 100 MG tablet Take 1 tablet 60 minutes prior to dental cleanings or procedures, Disp-3 tablet, R-0Normal           CONTINUE these medications which have CHANGED    Details   clopidogrel (PLAVIX) 75 MG tablet Take 1 tablet by mouth daily, Disp-90 tablet, R-1Normal           CONTINUE these medications which have NOT CHANGED    Details   pantoprazole

## 2025-07-15 ENCOUNTER — TELEPHONE (OUTPATIENT)
Dept: CARDIOLOGY CLINIC | Age: 75
End: 2025-07-15

## 2025-07-15 ENCOUNTER — TELEPHONE (OUTPATIENT)
Age: 75
End: 2025-07-15

## 2025-07-15 NOTE — TELEPHONE ENCOUNTER
Advised patient that HIS visit with Dr. Larkin scheduled on 8/5/25 will instead be with Mery Zapata at 0900.

## 2025-07-15 NOTE — TELEPHONE ENCOUNTER
ISSAC INSTRUCTIONS     I called Rodney to go over his ISSAC Instructions  Place: [facility]  Date & time of ISSAC: 7/25/25 at 8:30 am  Arrival Time: 7:30 am  NPO after midnight before ISSAC  Take the following medications with a sip of water the morning of the procedure: Metoprolol & Amlodipine  Hold all of your other medications until after the ISSAC:   Take the last dose of vitamins or herbal supplements on 7/19  Must have a ride home after the procedure  More detailed instructions mailed to pt, emailed to [email or web address], and will be scanned into Epic.  All questions answered    AMOR Barron appt: 8/5/25 at 9:30 am

## 2025-07-21 ENCOUNTER — TELEPHONE (OUTPATIENT)
Dept: CARDIOLOGY CLINIC | Age: 75
End: 2025-07-21

## 2025-07-21 RX ORDER — SODIUM CHLORIDE 9 MG/ML
INJECTION, SOLUTION INTRAVENOUS CONTINUOUS
Status: CANCELLED | OUTPATIENT
Start: 2025-07-25

## 2025-07-21 NOTE — TELEPHONE ENCOUNTER
Prakash had his Watchman on 6/2/25& was prescribed ASA 81 mg & Plavix daily for 6 months post procedure.    Rodney called to notify me that he developed hematuria on Friday night & stopped his plavix on Saturday & the bleeding has stopped.  Dr. Larkin is on vacation in [place] & I notified Dr. Pennington, Dr. Lane  who is doing his 45 day FU ISSAC on 7/25), & Dr. Post (who is covering for Dr. Wolf - who is also OOT).    Dr. Pennington said to have him stay of the Plavix.

## 2025-07-23 ENCOUNTER — TELEPHONE (OUTPATIENT)
Dept: CARDIOLOGY CLINIC | Age: 75
End: 2025-07-23

## 2025-07-23 NOTE — TELEPHONE ENCOUNTER
Although Dr. Larkin is on vacation, I notified him of Pablito's hematuria & the discontinuation of plavix.  He said to have Pablito restart the plavix & monitor his urine.  I called Pablito & notified him of the above & instructed him to notify me ASAP if hematuria reoccurs.

## 2025-07-24 ENCOUNTER — TELEPHONE (OUTPATIENT)
Age: 75
End: 2025-07-24

## 2025-07-25 ENCOUNTER — HOSPITAL ENCOUNTER (OUTPATIENT)
Age: 75
Discharge: HOME | End: 2025-07-27
Attending: INTERNAL MEDICINE
Payer: MEDICARE

## 2025-07-25 ENCOUNTER — ANESTHESIA EVENT (OUTPATIENT)
Age: 75
End: 2025-07-25
Payer: MEDICARE

## 2025-07-25 ENCOUNTER — ANESTHESIA (OUTPATIENT)
Age: 75
End: 2025-07-25
Payer: MEDICARE

## 2025-07-25 VITALS
DIASTOLIC BLOOD PRESSURE: 80 MMHG | RESPIRATION RATE: 18 BRPM | BODY MASS INDEX: 27.96 KG/M2 | SYSTOLIC BLOOD PRESSURE: 136 MMHG | OXYGEN SATURATION: 98 % | HEIGHT: 73 IN | TEMPERATURE: 97.8 F | WEIGHT: 211 LBS | HEART RATE: 66 BPM

## 2025-07-25 DIAGNOSIS — Z95.818 PRESENCE OF WATCHMAN LEFT ATRIAL APPENDAGE CLOSURE DEVICE: ICD-10-CM

## 2025-07-25 DIAGNOSIS — I48.19 PERSISTENT ATRIAL FIBRILLATION (HCC): ICD-10-CM

## 2025-07-25 LAB — ECHO BSA: 2.22 M2

## 2025-07-25 PROCEDURE — 3700000001 HC ADD 15 MINUTES (ANESTHESIA)

## 2025-07-25 PROCEDURE — 7100000010 HC PHASE II RECOVERY - FIRST 15 MIN

## 2025-07-25 PROCEDURE — 6360000002 HC RX W HCPCS: Performed by: NURSE ANESTHETIST, CERTIFIED REGISTERED

## 2025-07-25 PROCEDURE — 93312 ECHO TRANSESOPHAGEAL: CPT

## 2025-07-25 PROCEDURE — 7100000011 HC PHASE II RECOVERY - ADDTL 15 MIN

## 2025-07-25 PROCEDURE — 2580000003 HC RX 258: Performed by: NURSE ANESTHETIST, CERTIFIED REGISTERED

## 2025-07-25 PROCEDURE — 3700000000 HC ANESTHESIA ATTENDED CARE

## 2025-07-25 RX ORDER — PROPOFOL 10 MG/ML
INJECTION, EMULSION INTRAVENOUS
Status: DISCONTINUED | OUTPATIENT
Start: 2025-07-25 | End: 2025-07-25 | Stop reason: SDUPTHER

## 2025-07-25 RX ORDER — SODIUM CHLORIDE 9 MG/ML
INJECTION, SOLUTION INTRAVENOUS
Status: DISCONTINUED | OUTPATIENT
Start: 2025-07-25 | End: 2025-07-25 | Stop reason: SDUPTHER

## 2025-07-25 RX ADMIN — PROPOFOL 160 MG: 10 INJECTION, EMULSION INTRAVENOUS at 08:40

## 2025-07-25 RX ADMIN — SODIUM CHLORIDE: 9 INJECTION, SOLUTION INTRAVENOUS at 08:38

## 2025-07-25 ASSESSMENT — LIFESTYLE VARIABLES: SMOKING_STATUS: 0

## 2025-07-25 ASSESSMENT — ENCOUNTER SYMPTOMS: SHORTNESS OF BREATH: 0

## 2025-07-25 NOTE — ANESTHESIA POSTPROCEDURE EVALUATION
Department of Anesthesiology  Postprocedure Note    Patient: Rodney Last  MRN: [id number]  YOB: 1950  Date of evaluation: 7/25/2025    Procedure Summary       Date: 07/25/25 Room / Location: [facility] Cardiac Cath Lab; [facility] Noninvasive Cardiology    Anesthesia Start: 0838 Anesthesia Stop: 0855    Procedure: ISSAC (PRN CONTRAST/BUBBLE/3D) Diagnosis:       Persistent atrial fibrillation (HCC)      Presence of Watchman left atrial appendage closure device    Scheduled Providers: Darien Curry DO Responsible Provider: Darien Curry DO    Anesthesia Type: MAC ASA Status: 3            Anesthesia Type: No value filed.    Anand Phase I: Anand Score: 10    Anand Phase II:      Anesthesia Post Evaluation    Patient location during evaluation: bedside  Patient participation: complete - patient cannot participate  Level of consciousness: awake and alert  Airway patency: patent  Nausea & Vomiting: no nausea and no vomiting  Cardiovascular status: blood pressure returned to baseline  Respiratory status: acceptable  Hydration status: euvolemic  Multimodal analgesia pain management approach    No notable events documented.

## 2025-07-25 NOTE — H&P
H&P    Name: Rodney Last    Age: 74 y.o.    Date of Admission: 2025  7:11 AM    Date of Service: 2025    CC: Abimael     Referring Physician: Dr Larkin    Primary Cardiologist: Dr Wolf    History of Present Illness:   Rodney Last is a 74 y.o. male who presented on 2025 for 45 day post watchman ISSAC..     Review of Systems:   Complete review of systems negative except as described above.    Past Medical History:  Past Medical History:   Diagnosis Date    Arthritis     Atrial flutter (HCC)     a fib    Cancer (HCC)     skin    Diabetes mellitus (HCC)     prediabetic    History of blood transfusion     Hyperlipidemia     Hypertension     Neoplasm of duodenum     GI Bleed    DALTON (obstructive sleep apnea)     no cpap    RBBB     Thyroid disease        Past Surgical History:  Past Surgical History:   Procedure Laterality Date    ABLATION OF DYSRHYTHMIC FOCUS  2023    Dr. Wolf    CARDIOVASCULAR STRESS TEST      CARDIOVERSION  2023    Successful  Dr. Wolf    CARDIOVERSION  10/04/2023    Successful Cardioversion   Dr. Wolf    CARDIOVERSION  10/30/2023    Successful  Dr. Wolf    COLONOSCOPY      COLONOSCOPY N/A 2023    COLONOSCOPY DIAGNOSTIC performed by Henry Ohara MD at [facility] ENDOSCOPY    EP DEVICE PROCEDURE N/A 2025    Watchman francisca closure device performed by Gabe Larkin [place] [facility] [place] [place]    HAND SURGERY      KIDNEY STONE REMOVAL  2025    KNEE SURGERY      NECK SURGERY      SKIN BIOPSY      TONSILLECTOMY      UPPER GASTROINTESTINAL ENDOSCOPY N/A 2023    EGD BIOPSY performed by Henry Ohara MD [place] [facility] [place]    UPPER GASTROINTESTINAL ENDOSCOPY N/A 2023    EGD POLYP SNARE performed by Henry Ohara MD at [facility] ENDOSCOPY       Family History:  Family History   Problem Relation Age of Onset    Diabetes Mother     Heart Attack Father 50        MI      Heart Attack Paternal Uncle 46        Social History:  Social History     Tobacco Use    Smoking status: Never     Passive exposure: Past    Smokeless tobacco: Never   Vaping Use    Vaping status: Never Used   Substance Use Topics    Alcohol use: No    Drug use: No       Allergies:  Allergies   Allergen Reactions    Latex Hives    Amiodarone     Pcn [Penicillins] Hives    Tape [Adhesive Tape] Itching     Also bandaids       Home Medications:  Prior to Admission medications    Medication Sig Start Date End Date Taking? Authorizing Provider   aspirin 81 MG EC tablet Take 1 tablet by mouth daily 6/4/25  Yes Gabe Larkin MD   clopidogrel (PLAVIX) 75 MG tablet Take 1 tablet by mouth daily 6/3/25  Yes Gabe Larkin MD   pantoprazole (PROTONIX) 40 MG tablet Take 1 tablet by mouth every morning (before breakfast) 7/28/23  Yes Kvng Alcocer APRN - CNP   meloxicam (MOBIC) 15 MG tablet Take 1 tablet by mouth daily   Yes Radha Duval MD   metFORMIN (GLUCOPHAGE) 500 MG tablet Take 1 tablet by mouth daily (with breakfast)   Yes Radha Duval MD   diphenhydrAMINE-APAP, sleep, (TYLENOL PM EXTRA STRENGTH)  MG tablet Take 2 tablets by mouth nightly as needed   Yes Radha Duval MD   metoprolol succinate (TOPROL XL) 50 MG extended release tablet Take 1 tablet by mouth 2 times daily 5/20/20  Yes Hector Cruz MD   fluticasone (FLONASE) 50 MCG/ACT nasal spray 1 spray by Nasal route nightly   Yes Radha Duval MD   Multiple Vitamin (MULTI VITAMIN MENS) TABS Take 1 tablet by mouth daily   Yes Radha Duval MD   Cholecalciferol (VITAMIN D3) 250 MCG (99273 UT) TABS Take 10,000 Units by mouth daily   Yes Radha Duval MD   vitamin C (ASCORBIC ACID) 500 MG tablet Take 1 tablet by mouth daily   Yes Radha Duval MD   amLODIPine (NORVASC) 10 MG tablet Take 1 tablet by mouth daily 9/5/15  Yes Radha Duval MD   doxycycline hyclate (VIBRA-TABS) 100 MG tablet Take 1 tablet 60 minutes prior to

## 2025-07-25 NOTE — ANESTHESIA PRE PROCEDURE
Never     Passive exposure: Past   • Smokeless tobacco: Never   Substance Use Topics   • Alcohol use: No                                Counseling given: Not Answered      Vital Signs (Current):   Vitals:    07/25/25 0725   BP: (!) 160/95   Pulse: 62   Resp: 18   Temp: 36.6 °C (97.8 °F)   TempSrc: Tympanic   SpO2: 98%   Weight: 95.7 kg (211 lb)   Height: 1.854 m (6' 1\")                                              BP Readings from Last 3 Encounters:   07/25/25 (!) 160/95   06/03/25 129/82   04/01/25 130/76       NPO Status: Time of last liquid consumption: 0600                        Time of last solid consumption: 1800                        Date of last liquid consumption: 07/25/25                        Date of last solid food consumption: 07/24/25    BMI:   Wt Readings from Last 3 Encounters:   07/25/25 95.7 kg (211 lb)   06/02/25 95.7 kg (211 lb)   04/01/25 96.6 kg (213 lb)     Body mass index is 27.84 kg/m².    CBC:   Lab Results   Component Value Date/Time    WBC 9.4 06/03/2025 05:42 AM    RBC 4.86 06/03/2025 05:42 AM    HGB 13.9 06/03/2025 05:42 AM    HCT 42.6 06/03/2025 05:42 AM    MCV 87.7 06/03/2025 05:42 AM    RDW 14.5 06/03/2025 05:42 AM     06/03/2025 05:42 AM       CMP:   Lab Results   Component Value Date/Time     06/03/2025 05:42 AM    K 5.0 06/03/2025 05:42 AM    K 4.4 05/20/2020 05:51 AM     06/03/2025 05:42 AM    CO2 24 06/03/2025 05:42 AM    BUN 20 06/03/2025 05:42 AM    CREATININE 0.8 06/03/2025 05:42 AM    GFRAA >60 05/20/2020 05:51 AM    LABGLOM >90 06/03/2025 05:42 AM    LABGLOM >60 10/30/2023 10:15 AM    GLUCOSE 126 06/03/2025 05:42 AM    CALCIUM 9.7 06/03/2025 05:42 AM    BILITOT 0.5 03/13/2025 12:25 PM    ALKPHOS 85 03/13/2025 12:25 PM    AST 32 03/13/2025 12:25 PM    ALT 32 03/13/2025 12:25 PM       POC Tests: No results for input(s): \"POCGLU\", \"POCNA\", \"POCK\", \"POCCL\", \"POCBUN\", \"POCHEMO\", \"POCHCT\" in the last 72 hours.    Coags:   Lab Results   Component Value

## 2025-07-25 NOTE — PROCEDURES
PROCEDURE NOTE  Date: 7/25/2025   Name: Rodney Last  YOB: 1950    Procedures      PRELIMINARY TRANSESOPHAGEAL ECHOCARDIOGRAPHY REPORT    Date of Procedure: 7/25/2025    Indication:  45 day post Watchman    Sedation: Propofol    Complications: None    Preliminary findings:  Normal LV function     31 mm Watchman FLX Pro well positioned in KALI  No DRT  Small erin-device leak which communicates with appendage and there is incomplete thrombosis of the appendage    Full report to follow    Camron Lane MD, FACC  [facility] Cardiology

## 2025-07-29 LAB — ECHO BSA: 2.22 M2

## 2025-07-30 ENCOUNTER — TELEPHONE (OUTPATIENT)
Dept: CARDIOLOGY CLINIC | Age: 75
End: 2025-07-30

## 2025-07-30 NOTE — TELEPHONE ENCOUNTER
I called Rodney to see if he has had any reoccurrence of hematura since restarting the Plavix on 7/23/25 & he said \"No\".

## 2025-07-31 ENCOUNTER — TELEPHONE (OUTPATIENT)
Age: 75
End: 2025-07-31

## 2025-08-05 ENCOUNTER — OFFICE VISIT (OUTPATIENT)
Dept: CARDIOLOGY CLINIC | Age: 75
End: 2025-08-05
Payer: MEDICARE

## 2025-08-05 VITALS
DIASTOLIC BLOOD PRESSURE: 82 MMHG | BODY MASS INDEX: 28.2 KG/M2 | HEIGHT: 73 IN | HEART RATE: 62 BPM | OXYGEN SATURATION: 97 % | RESPIRATION RATE: 18 BRPM | WEIGHT: 212.8 LBS | SYSTOLIC BLOOD PRESSURE: 134 MMHG

## 2025-08-05 DIAGNOSIS — Z95.818 PRESENCE OF WATCHMAN LEFT ATRIAL APPENDAGE CLOSURE DEVICE: ICD-10-CM

## 2025-08-05 DIAGNOSIS — I48.19 PERSISTENT ATRIAL FIBRILLATION (HCC): Primary | ICD-10-CM

## 2025-08-05 PROCEDURE — 99213 OFFICE O/P EST LOW 20 MIN: CPT | Performed by: PHYSICIAN ASSISTANT

## 2025-08-05 PROCEDURE — 1159F MED LIST DOCD IN RCRD: CPT | Performed by: PHYSICIAN ASSISTANT

## 2025-08-05 PROCEDURE — 1123F ACP DISCUSS/DSCN MKR DOCD: CPT | Performed by: PHYSICIAN ASSISTANT

## 2025-08-05 PROCEDURE — 3079F DIAST BP 80-89 MM HG: CPT | Performed by: PHYSICIAN ASSISTANT

## 2025-08-05 PROCEDURE — 3075F SYST BP GE 130 - 139MM HG: CPT | Performed by: PHYSICIAN ASSISTANT

## 2025-08-11 ENCOUNTER — TELEPHONE (OUTPATIENT)
Dept: CARDIOLOGY CLINIC | Age: 75
End: 2025-08-11

## (undated) DEVICE — GUIDEWIRE VASCULAR L145CM 0.035IN J TIP L3MM PTFE FIX COR NAMIC

## (undated) DEVICE — BLOCK BITE 60FR RUBBER ADLT DENTAL

## (undated) DEVICE — PINNACLE INTRODUCER SHEATH: Brand: PINNACLE

## (undated) DEVICE — TUBING PRSS MON L48IN PVC RIG NONEXPANDING M TO FEM CONN

## (undated) DEVICE — FORMALIN  10%NBF 60ML PREFLL CONT

## (undated) DEVICE — SURGICAL PROCEDURE KIT 3 W

## (undated) DEVICE — TRAY SURG CUST EPIDURAL SEHC

## (undated) DEVICE — SNARE ENDOSCP L240CM LOOP W13MM SHTH DIA2.4MM SM OVL FLX

## (undated) DEVICE — NEEDLE ANGIO 1 WALL STYL 18 GAX70 CM THN ADV

## (undated) DEVICE — SPONGE GZ W4XL4IN RAYON POLY FILL CVR W/ NONWOVEN FAB STERILE

## (undated) DEVICE — ACCESS SHEATH WITH DILATOR: Brand: WATCHMAN TRUSTEER™ ACCESS SYSTEM

## (undated) DEVICE — FORCEPS BX L160CM JAW DIA2.4MM YEL L CAP W/ NDL DISP RAD

## (undated) DEVICE — TRAP POLYP ETRAP

## (undated) DEVICE — 1 X VERSACROSS TRANSSEPTAL SHEATH (INCLUDING  1 X J-TIP GUIDEWIRE); 1 X VERSACROSS RF WIRE (INCLUDING 1 X CONNECTOR CABLE (SINGLE USE)); 1 X DISPERSIVE ELECTRODE: Brand: VERSACROSS ACCESS SOLUTION

## (undated) DEVICE — Z DISCONTINUED NO SUB IDED TUBING ETCO2 AD L6.5FT NSL ORAL CVD PRNG NONFLARED TIP OVR

## (undated) DEVICE — GAUZE,SPONGE,4"X4",8PLY,STRL,LF,10/TRAY: Brand: MEDLINE

## (undated) DEVICE — PERCLOSE™ PROSTYLE™ SUTURE-MEDIATED CLOSURE AND REPAIR SYSTEM: Brand: PERCLOSE™ PROSTYLE™

## (undated) DEVICE — ELECTRODE PT RET AD L9FT HI MOIST COND ADH HYDRGEL CORDED

## (undated) DEVICE — CATHETER DIAG 5FR L110CM PIG CRV SZ 6 SIDE H DBL BRAID WIRE

## (undated) DEVICE — DEFENDO AIR WATER SUCTION AND BIOPSY VALVE KIT FOR  OLYMPUS: Brand: DEFENDO AIR/WATER/SUCTION AND BIOPSY VALVE

## (undated) DEVICE — ACCESS SHEATH WITH DILATOR: Brand: WATCHMAN FXD CURVE™ ACCESS SYSTEM

## (undated) DEVICE — SNARE ENDOSCP L240CM W15MM SHTH DIA2.4MM CHN 2.8MM STIFF

## (undated) DEVICE — BITEBLOCK 54FR W/ DENT RIM BLOX

## (undated) DEVICE — PAD, DEFIB, ADULT, RADIOTRAN, PHYSIO, LO: Brand: MEDLINE

## (undated) DEVICE — SNARE ENDOSCP POLYP SM 2.4 MM 195 CM 13 MM 2.8 MM CAPTIVATOR

## (undated) DEVICE — GRADUATE TRIANG MEASURE 1000ML BLK PRNT